# Patient Record
Sex: FEMALE | Race: OTHER | NOT HISPANIC OR LATINO | Employment: OTHER | URBAN - METROPOLITAN AREA
[De-identification: names, ages, dates, MRNs, and addresses within clinical notes are randomized per-mention and may not be internally consistent; named-entity substitution may affect disease eponyms.]

---

## 2020-09-29 ENCOUNTER — OFFICE VISIT (OUTPATIENT)
Dept: FAMILY MEDICINE CLINIC | Facility: CLINIC | Age: 60
End: 2020-09-29
Payer: COMMERCIAL

## 2020-09-29 VITALS
DIASTOLIC BLOOD PRESSURE: 98 MMHG | WEIGHT: 129.2 LBS | SYSTOLIC BLOOD PRESSURE: 160 MMHG | HEIGHT: 62 IN | TEMPERATURE: 97.6 F | BODY MASS INDEX: 23.77 KG/M2

## 2020-09-29 DIAGNOSIS — Z96.641 CHRONIC HIP PAIN AFTER TOTAL REPLACEMENT OF RIGHT HIP JOINT: ICD-10-CM

## 2020-09-29 DIAGNOSIS — G89.29 CHRONIC HIP PAIN AFTER TOTAL REPLACEMENT OF RIGHT HIP JOINT: ICD-10-CM

## 2020-09-29 DIAGNOSIS — M25.551 CHRONIC HIP PAIN AFTER TOTAL REPLACEMENT OF RIGHT HIP JOINT: ICD-10-CM

## 2020-09-29 DIAGNOSIS — S71.001A OPEN WOUND OF RIGHT HIP, INITIAL ENCOUNTER: Primary | ICD-10-CM

## 2020-09-29 PROCEDURE — 3725F SCREEN DEPRESSION PERFORMED: CPT | Performed by: FAMILY MEDICINE

## 2020-09-29 PROCEDURE — 99203 OFFICE O/P NEW LOW 30 MIN: CPT | Performed by: FAMILY MEDICINE

## 2020-09-29 RX ORDER — CEPHALEXIN 500 MG/1
500 CAPSULE ORAL EVERY 12 HOURS SCHEDULED
Qty: 20 CAPSULE | Refills: 0 | Status: SHIPPED | OUTPATIENT
Start: 2020-09-29 | End: 2020-10-09

## 2020-09-29 RX ORDER — SULFAMETHOXAZOLE AND TRIMETHOPRIM 800; 160 MG/1; MG/1
1 TABLET ORAL EVERY 12 HOURS SCHEDULED
Qty: 20 TABLET | Refills: 0 | Status: SHIPPED | OUTPATIENT
Start: 2020-09-29 | End: 2020-10-09

## 2020-09-29 NOTE — PROGRESS NOTES
Assessment/Plan:    Open wound of right hip  Patient really needs acute evaluation of this Patient refuses to go to hospital Patient sister who is with her said it took 2 weeks to convince her to come here Patient wound was cultured antibiotics were started Patient to have CBC and CMP done Check xray Refer to orhto If fever or chills the patient to go to ER Recheck in 2 weeks     Chronic hip pain after total replacement of right hip joint  Check xray patient to see ortho       Diagnoses and all orders for this visit:    Open wound of right hip, initial encounter  -     Ambulatory referral to Orthopedic Surgery; Future  -     Cancel: Wound culture and Gram stain; Future  -     Comprehensive metabolic panel; Future  -     CBC and differential; Future  -     sulfamethoxazole-trimethoprim (BACTRIM DS) 800-160 mg per tablet; Take 1 tablet by mouth every 12 (twelve) hours for 10 days  -     cephalexin (KEFLEX) 500 mg capsule; Take 1 capsule (500 mg total) by mouth every 12 (twelve) hours for 10 days  -     XR hip/pelv 2-3 vws right if performed; Future  -     Comprehensive metabolic panel  -     CBC and differential  -     Culture, Aerobic and Anaerobic w/Gram Stain    Chronic hip pain after total replacement of right hip joint  -     Ambulatory referral to Orthopedic Surgery; Future  -     XR hip/pelv 2-3 vws right if performed; Future          Subjective:   Chief Complaint   Patient presents with    Hip Pain     right           Patient ID: Billie Villanueva is a 61 y o  female      Patient is here as a new patient today She has not seen any family doctors in almost 5 yrs She thinks she had labs done about 2 yrs ago "somewhere out on [employer]" Patient had been scheduled ot have surgery for her right hip Dr Leroy Osullivan about 2 yrs ago She has had trauma and had surgery in her early 19's Patient has metal pins plates and wires Patient hs had issue with painful hardware and was to have it removed patient surgery was cancelled for some reason so she "just never went back" Patient ahs since then had an area on her right thigh that keeps   " breaking open and draining" then will eventually heal Patient has for last 2 months had area open and draining Patient does not want to see Dr Jerson Mcmahon and also does not want to see [facility] Patient is thinking of seeing Dr Bassam Cristobal Patient has no fever or chills Patient has constant hip joint pain on the right in addition to the pain and drainage on the right thigh wound Patient refuses ER or hospitalization for this Patient also has history of abnorma cervical cancer screening She has not had colonoscopy She has significant fear or doctors Patient is extremely nervous today     Hip Pain    Incident onset: pain for 2 yrs Wound for 2 months  Injury mechanism: no actual injury Her "surgical hardware is shifting" The pain is present in the right hip  The pain is at a severity of 5/10  The pain is moderate  The pain has been worsening since onset  Pertinent negatives include no inability to bear weight, loss of motion, loss of sensation, muscle weakness, numbness or tingling  Foreign body present: metal hardware  The symptoms are aggravated by movement  She has tried acetaminophen for the symptoms  The treatment provided no relief  The following portions of the patient's history were reviewed and updated as appropriate: allergies, current medications, past family history, past medical history, past social history, past surgical history and problem list     Review of Systems   Constitutional: Negative for fatigue, fever and unexpected weight change  HENT: Negative for congestion, sinus pain and trouble swallowing  Eyes: Negative for discharge and visual disturbance  Respiratory: Negative for cough, chest tightness, shortness of breath and wheezing  Cardiovascular: Negative for chest pain, palpitations and leg swelling     Gastrointestinal: Negative for abdominal pain, blood in stool, constipation, diarrhea, nausea and vomiting  Genitourinary: Negative for difficulty urinating, dysuria, frequency and hematuria  Musculoskeletal: Positive for gait problem  Negative for arthralgias and joint swelling  Right hip pain   Skin: Positive for wound  Negative for rash  Allergic/Immunologic: Negative for environmental allergies and food allergies  Neurological: Negative for dizziness, tingling, syncope, weakness, numbness and headaches  Hematological: Negative for adenopathy  Does not bruise/bleed easily  Psychiatric/Behavioral: Negative for confusion, decreased concentration and sleep disturbance  The patient is not nervous/anxious  Objective:      /98   Temp 97 6 °F (36 4 °C)   Ht 5' 2" (1 575 m)   Wt 58 6 kg (129 lb 3 2 oz)   BMI 23 63 kg/m²          Physical Exam  Vitals signs and nursing note reviewed  Constitutional:       Appearance: Normal appearance  HENT:      Head: Normocephalic and atraumatic  Right Ear: Tympanic membrane normal       Left Ear: Tympanic membrane normal    Eyes:      Extraocular Movements: Extraocular movements intact  Conjunctiva/sclera: Conjunctivae normal       Pupils: Pupils are equal, round, and reactive to light  Cardiovascular:      Rate and Rhythm: Normal rate and regular rhythm  Pulses: Normal pulses  Heart sounds: Normal heart sounds  Pulmonary:      Effort: Pulmonary effort is normal       Breath sounds: Normal breath sounds  Abdominal:      General: Abdomen is flat  Bowel sounds are normal       Palpations: Abdomen is soft  Musculoskeletal:      Comments: Right lateral thigh with deficit noted Patient has decrease ROM of the right hip Patient has open wound draining thick yellow materail Wound is about 5 mm in size   Neurological:      Mental Status: She is alert  Cranial Nerves: No cranial nerve deficit        Gait: Gait abnormal    Psychiatric:         Mood and Affect: Mood normal  Behavior: Behavior normal          Thought Content:  Thought content normal          Judgment: Judgment normal

## 2020-09-29 NOTE — ASSESSMENT & PLAN NOTE
Patient really needs acute evaluation of this Patient refuses to go to hospital Patient sister who is with her said it took 2 weeks to convince her to come here Patient wound was cultured antibiotics were started Patient to have CBC and CMP done Check xray Refer to orhto If fever or chills the patient to go to ER Recheck in 2 weeks

## 2020-09-30 ENCOUNTER — APPOINTMENT (OUTPATIENT)
Dept: RADIOLOGY | Facility: MEDICAL CENTER | Age: 60
End: 2020-09-30
Payer: COMMERCIAL

## 2020-09-30 DIAGNOSIS — M25.551 CHRONIC HIP PAIN AFTER TOTAL REPLACEMENT OF RIGHT HIP JOINT: ICD-10-CM

## 2020-09-30 DIAGNOSIS — G89.29 CHRONIC HIP PAIN AFTER TOTAL REPLACEMENT OF RIGHT HIP JOINT: ICD-10-CM

## 2020-09-30 DIAGNOSIS — S71.001A OPEN WOUND OF RIGHT HIP, INITIAL ENCOUNTER: ICD-10-CM

## 2020-09-30 DIAGNOSIS — Z96.641 CHRONIC HIP PAIN AFTER TOTAL REPLACEMENT OF RIGHT HIP JOINT: ICD-10-CM

## 2020-09-30 PROCEDURE — 73502 X-RAY EXAM HIP UNI 2-3 VIEWS: CPT

## 2020-10-13 ENCOUNTER — OFFICE VISIT (OUTPATIENT)
Dept: OBGYN CLINIC | Facility: MEDICAL CENTER | Age: 60
End: 2020-10-13
Payer: COMMERCIAL

## 2020-10-13 VITALS
DIASTOLIC BLOOD PRESSURE: 68 MMHG | WEIGHT: 129 LBS | HEIGHT: 62 IN | BODY MASS INDEX: 23.74 KG/M2 | TEMPERATURE: 98.3 F | SYSTOLIC BLOOD PRESSURE: 125 MMHG

## 2020-10-13 DIAGNOSIS — G89.29 CHRONIC HIP PAIN AFTER TOTAL REPLACEMENT OF RIGHT HIP JOINT: Primary | ICD-10-CM

## 2020-10-13 DIAGNOSIS — Z96.641 CHRONIC HIP PAIN AFTER TOTAL REPLACEMENT OF RIGHT HIP JOINT: Primary | ICD-10-CM

## 2020-10-13 DIAGNOSIS — M25.551 CHRONIC HIP PAIN AFTER TOTAL REPLACEMENT OF RIGHT HIP JOINT: Primary | ICD-10-CM

## 2020-10-13 DIAGNOSIS — S71.001A OPEN WOUND OF RIGHT HIP, INITIAL ENCOUNTER: ICD-10-CM

## 2020-10-13 PROCEDURE — 99243 OFF/OP CNSLTJ NEW/EST LOW 30: CPT | Performed by: ORTHOPAEDIC SURGERY

## 2020-10-20 ENCOUNTER — TELEPHONE (OUTPATIENT)
Dept: FAMILY MEDICINE CLINIC | Facility: CLINIC | Age: 60
End: 2020-10-20

## 2020-10-20 ENCOUNTER — TELEPHONE (OUTPATIENT)
Dept: OBGYN CLINIC | Facility: HOSPITAL | Age: 60
End: 2020-10-20

## 2020-10-20 DIAGNOSIS — F06.4 ANXIETY DISORDER DUE TO MEDICAL CONDITION: Primary | ICD-10-CM

## 2020-10-20 RX ORDER — DIAZEPAM 5 MG/1
TABLET ORAL
Qty: 1 TABLET | Refills: 0 | Status: SHIPPED | OUTPATIENT
Start: 2020-10-20 | End: 2022-06-07

## 2020-10-21 ENCOUNTER — HOSPITAL ENCOUNTER (OUTPATIENT)
Dept: RADIOLOGY | Facility: HOSPITAL | Age: 60
Discharge: HOME | End: 2020-10-21
Attending: ORTHOPAEDIC SURGERY | Admitting: RADIOLOGY
Payer: COMMERCIAL

## 2020-10-21 DIAGNOSIS — S71.001A OPEN WOUND OF RIGHT HIP, INITIAL ENCOUNTER: ICD-10-CM

## 2020-10-21 DIAGNOSIS — M25.551 CHRONIC HIP PAIN AFTER TOTAL REPLACEMENT OF RIGHT HIP JOINT: ICD-10-CM

## 2020-10-21 DIAGNOSIS — G89.29 CHRONIC HIP PAIN AFTER TOTAL REPLACEMENT OF RIGHT HIP JOINT: ICD-10-CM

## 2020-10-21 DIAGNOSIS — Z96.641 CHRONIC HIP PAIN AFTER TOTAL REPLACEMENT OF RIGHT HIP JOINT: ICD-10-CM

## 2020-10-21 LAB
APPEARANCE FLD: ABNORMAL
COLOR FLD: ABNORMAL
CRYSTALS SNV QL MICRO: NORMAL
EOSINOPHIL NFR SNV MANUAL: 1 %
LYMPHOCYTES # SNV MANUAL: 5 %
MONOCYTES NFR SNV MANUAL: 2 %
NEUTROPHILS NFR SNV MANUAL: 92 %
SITE: ABNORMAL
TOTAL CELLS COUNTED SPEC: 100
WBC # FLD MANUAL: 1191 /UL (ref 0–200)

## 2020-10-21 PROCEDURE — 77002 NEEDLE LOCALIZATION BY XRAY: CPT

## 2020-10-21 PROCEDURE — 87205 SMEAR GRAM STAIN: CPT | Performed by: PHYSICIAN ASSISTANT

## 2020-10-21 PROCEDURE — 20610 DRAIN/INJ JOINT/BURSA W/O US: CPT

## 2020-10-21 PROCEDURE — 89060 EXAM SYNOVIAL FLUID CRYSTALS: CPT | Performed by: PHYSICIAN ASSISTANT

## 2020-10-21 PROCEDURE — 87075 CULTR BACTERIA EXCEPT BLOOD: CPT | Performed by: PHYSICIAN ASSISTANT

## 2020-10-21 PROCEDURE — 87070 CULTURE OTHR SPECIMN AEROBIC: CPT | Performed by: PHYSICIAN ASSISTANT

## 2020-10-21 PROCEDURE — 89051 BODY FLUID CELL COUNT: CPT | Performed by: PHYSICIAN ASSISTANT

## 2020-10-21 RX ORDER — SODIUM CHLORIDE 9 MG/ML
10 INJECTION INTRAVENOUS
Status: DISCONTINUED | OUTPATIENT
Start: 2020-10-21 | End: 2020-10-22 | Stop reason: HOSPADM

## 2020-10-21 RX ORDER — LIDOCAINE HYDROCHLORIDE 10 MG/ML
10 INJECTION, SOLUTION EPIDURAL; INFILTRATION; INTRACAUDAL; PERINEURAL
Status: DISCONTINUED | OUTPATIENT
Start: 2020-10-21 | End: 2020-10-22 | Stop reason: HOSPADM

## 2020-10-21 RX ADMIN — IOHEXOL 3 ML: 300 INJECTION, SOLUTION INTRAVENOUS at 16:10

## 2020-10-24 LAB
BACTERIA SPEC ANAEROBE CULT: NO GROWTH
BACTERIA SPEC BFLD CULT: NO GROWTH
GRAM STN SPEC: NORMAL

## 2020-11-03 ENCOUNTER — LAB (OUTPATIENT)
Dept: LAB | Facility: HOSPITAL | Age: 60
End: 2020-11-03
Attending: ORTHOPAEDIC SURGERY
Payer: COMMERCIAL

## 2020-11-03 ENCOUNTER — TELEPHONE (OUTPATIENT)
Dept: OBGYN CLINIC | Facility: MEDICAL CENTER | Age: 60
End: 2020-11-03

## 2020-11-03 DIAGNOSIS — M25.551 CHRONIC HIP PAIN AFTER TOTAL REPLACEMENT OF RIGHT HIP JOINT: ICD-10-CM

## 2020-11-03 DIAGNOSIS — G89.29 CHRONIC HIP PAIN AFTER TOTAL REPLACEMENT OF RIGHT HIP JOINT: ICD-10-CM

## 2020-11-03 DIAGNOSIS — Z96.641 CHRONIC HIP PAIN AFTER TOTAL REPLACEMENT OF RIGHT HIP JOINT: ICD-10-CM

## 2020-11-03 DIAGNOSIS — S71.001A OPEN WOUND OF RIGHT HIP, INITIAL ENCOUNTER: ICD-10-CM

## 2020-11-03 LAB
CRP SERPL QL: 5.2 MG/L
ERYTHROCYTE [SEDIMENTATION RATE] IN BLOOD: 15 MM/HOUR (ref 0–29)

## 2020-11-03 PROCEDURE — 85652 RBC SED RATE AUTOMATED: CPT

## 2020-11-03 PROCEDURE — 82495 ASSAY OF CHROMIUM: CPT

## 2020-11-03 PROCEDURE — 83018 HEAVY METAL QUAN EACH NES: CPT

## 2020-11-03 PROCEDURE — 36415 COLL VENOUS BLD VENIPUNCTURE: CPT

## 2020-11-03 PROCEDURE — 86140 C-REACTIVE PROTEIN: CPT

## 2020-11-05 LAB — COBALT SERPL-MCNC: 1 UG/L (ref 0–0.9)

## 2020-11-06 LAB — CR SERPL-MCNC: 2 UG/L (ref 0.1–2.1)

## 2020-11-10 ENCOUNTER — OFFICE VISIT (OUTPATIENT)
Dept: OBGYN CLINIC | Facility: MEDICAL CENTER | Age: 60
End: 2020-11-10
Payer: COMMERCIAL

## 2020-11-10 VITALS
WEIGHT: 130 LBS | HEART RATE: 108 BPM | HEIGHT: 62 IN | TEMPERATURE: 98.5 F | BODY MASS INDEX: 23.92 KG/M2 | DIASTOLIC BLOOD PRESSURE: 107 MMHG | SYSTOLIC BLOOD PRESSURE: 162 MMHG

## 2020-11-10 DIAGNOSIS — M25.551 CHRONIC HIP PAIN AFTER TOTAL REPLACEMENT OF RIGHT HIP JOINT: Primary | ICD-10-CM

## 2020-11-10 DIAGNOSIS — G89.29 CHRONIC HIP PAIN AFTER TOTAL REPLACEMENT OF RIGHT HIP JOINT: Primary | ICD-10-CM

## 2020-11-10 DIAGNOSIS — Z96.641 CHRONIC HIP PAIN AFTER TOTAL REPLACEMENT OF RIGHT HIP JOINT: Primary | ICD-10-CM

## 2020-11-10 PROCEDURE — 99214 OFFICE O/P EST MOD 30 MIN: CPT | Performed by: ORTHOPAEDIC SURGERY

## 2020-11-17 ENCOUNTER — OFFICE VISIT (OUTPATIENT)
Dept: OBGYN CLINIC | Facility: MEDICAL CENTER | Age: 60
End: 2020-11-17
Payer: COMMERCIAL

## 2020-11-17 VITALS
WEIGHT: 130 LBS | TEMPERATURE: 97.8 F | SYSTOLIC BLOOD PRESSURE: 139 MMHG | HEIGHT: 62 IN | DIASTOLIC BLOOD PRESSURE: 83 MMHG | HEART RATE: 106 BPM | BODY MASS INDEX: 23.92 KG/M2

## 2020-11-17 DIAGNOSIS — Z96.641 CHRONIC HIP PAIN AFTER TOTAL REPLACEMENT OF RIGHT HIP JOINT: Primary | ICD-10-CM

## 2020-11-17 DIAGNOSIS — M25.551 CHRONIC HIP PAIN AFTER TOTAL REPLACEMENT OF RIGHT HIP JOINT: Primary | ICD-10-CM

## 2020-11-17 DIAGNOSIS — G89.29 CHRONIC HIP PAIN AFTER TOTAL REPLACEMENT OF RIGHT HIP JOINT: Primary | ICD-10-CM

## 2020-11-17 PROCEDURE — 99214 OFFICE O/P EST MOD 30 MIN: CPT | Performed by: ORTHOPAEDIC SURGERY

## 2020-11-17 PROCEDURE — 3008F BODY MASS INDEX DOCD: CPT | Performed by: ORTHOPAEDIC SURGERY

## 2022-06-02 ENCOUNTER — HOSPITAL ENCOUNTER (INPATIENT)
Facility: HOSPITAL | Age: 62
LOS: 4 days | Discharge: HOME | DRG: 192 | End: 2022-06-07
Attending: EMERGENCY MEDICINE | Admitting: INTERNAL MEDICINE
Payer: COMMERCIAL

## 2022-06-02 ENCOUNTER — APPOINTMENT (EMERGENCY)
Dept: RADIOLOGY | Facility: HOSPITAL | Age: 62
DRG: 192 | End: 2022-06-02
Payer: COMMERCIAL

## 2022-06-02 DIAGNOSIS — R60.0 LOWER EXTREMITY EDEMA: ICD-10-CM

## 2022-06-02 DIAGNOSIS — I10 HYPERTENSION: ICD-10-CM

## 2022-06-02 DIAGNOSIS — K04.7 TOOTH INFECTION: ICD-10-CM

## 2022-06-02 DIAGNOSIS — R79.89 ELEVATED D-DIMER: ICD-10-CM

## 2022-06-02 DIAGNOSIS — I50.9 CHF (CONGESTIVE HEART FAILURE) (HCC): Primary | ICD-10-CM

## 2022-06-02 DIAGNOSIS — R59.0 MEDIASTINAL LYMPHADENOPATHY: ICD-10-CM

## 2022-06-02 DIAGNOSIS — R65.10 SIRS (SYSTEMIC INFLAMMATORY RESPONSE SYNDROME) (HCC): ICD-10-CM

## 2022-06-02 LAB
ANION GAP SERPL CALCULATED.3IONS-SCNC: 10 MMOL/L (ref 4–13)
BASOPHILS # BLD AUTO: 0.14 THOUSANDS/ΜL (ref 0–0.1)
BASOPHILS NFR BLD AUTO: 1 % (ref 0–1)
BUN SERPL-MCNC: 17 MG/DL (ref 5–25)
CALCIUM SERPL-MCNC: 8.7 MG/DL (ref 8.3–10.1)
CARDIAC TROPONIN I PNL SERPL HS: 44 NG/L
CHLORIDE SERPL-SCNC: 101 MMOL/L (ref 100–108)
CO2 SERPL-SCNC: 26 MMOL/L (ref 21–32)
CREAT SERPL-MCNC: 0.87 MG/DL (ref 0.6–1.3)
D DIMER PPP FEU-MCNC: 1.01 UG/ML FEU
EOSINOPHIL # BLD AUTO: 0.29 THOUSAND/ΜL (ref 0–0.61)
EOSINOPHIL NFR BLD AUTO: 2 % (ref 0–6)
ERYTHROCYTE [DISTWIDTH] IN BLOOD BY AUTOMATED COUNT: 18.3 % (ref 11.6–15.1)
GFR SERPL CREATININE-BSD FRML MDRD: 72 ML/MIN/1.73SQ M
GLUCOSE SERPL-MCNC: 105 MG/DL (ref 65–140)
HCT VFR BLD AUTO: 38.1 % (ref 34.8–46.1)
HGB BLD-MCNC: 11.7 G/DL (ref 11.5–15.4)
IMM GRANULOCYTES # BLD AUTO: 0.07 THOUSAND/UL (ref 0–0.2)
IMM GRANULOCYTES NFR BLD AUTO: 1 % (ref 0–2)
LACTATE SERPL-SCNC: 1.2 MMOL/L (ref 0.5–2)
LYMPHOCYTES # BLD AUTO: 2.7 THOUSANDS/ΜL (ref 0.6–4.47)
LYMPHOCYTES NFR BLD AUTO: 21 % (ref 14–44)
MCH RBC QN AUTO: 25.3 PG (ref 26.8–34.3)
MCHC RBC AUTO-ENTMCNC: 30.7 G/DL (ref 31.4–37.4)
MCV RBC AUTO: 83 FL (ref 82–98)
MONOCYTES # BLD AUTO: 1.15 THOUSAND/ΜL (ref 0.17–1.22)
MONOCYTES NFR BLD AUTO: 9 % (ref 4–12)
NEUTROPHILS # BLD AUTO: 8.53 THOUSANDS/ΜL (ref 1.85–7.62)
NEUTS SEG NFR BLD AUTO: 66 % (ref 43–75)
NRBC BLD AUTO-RTO: 0 /100 WBCS
NT-PROBNP SERPL-MCNC: 9840 PG/ML
PLATELET # BLD AUTO: 486 THOUSANDS/UL (ref 149–390)
PMV BLD AUTO: 10.8 FL (ref 8.9–12.7)
POTASSIUM SERPL-SCNC: 4.8 MMOL/L (ref 3.5–5.3)
RBC # BLD AUTO: 4.62 MILLION/UL (ref 3.81–5.12)
SODIUM SERPL-SCNC: 137 MMOL/L (ref 136–145)
WBC # BLD AUTO: 12.88 THOUSAND/UL (ref 4.31–10.16)

## 2022-06-02 PROCEDURE — 83605 ASSAY OF LACTIC ACID: CPT | Performed by: EMERGENCY MEDICINE

## 2022-06-02 PROCEDURE — 80048 BASIC METABOLIC PNL TOTAL CA: CPT | Performed by: EMERGENCY MEDICINE

## 2022-06-02 PROCEDURE — 80061 LIPID PANEL: CPT | Performed by: PHYSICIAN ASSISTANT

## 2022-06-02 PROCEDURE — 83880 ASSAY OF NATRIURETIC PEPTIDE: CPT | Performed by: EMERGENCY MEDICINE

## 2022-06-02 PROCEDURE — 71046 X-RAY EXAM CHEST 2 VIEWS: CPT

## 2022-06-02 PROCEDURE — 36415 COLL VENOUS BLD VENIPUNCTURE: CPT | Performed by: EMERGENCY MEDICINE

## 2022-06-02 PROCEDURE — 99285 EMERGENCY DEPT VISIT HI MDM: CPT

## 2022-06-02 PROCEDURE — 93005 ELECTROCARDIOGRAM TRACING: CPT

## 2022-06-02 PROCEDURE — 84484 ASSAY OF TROPONIN QUANT: CPT | Performed by: EMERGENCY MEDICINE

## 2022-06-02 PROCEDURE — 85025 COMPLETE CBC W/AUTO DIFF WBC: CPT | Performed by: EMERGENCY MEDICINE

## 2022-06-02 PROCEDURE — 99291 CRITICAL CARE FIRST HOUR: CPT | Performed by: EMERGENCY MEDICINE

## 2022-06-02 PROCEDURE — 85379 FIBRIN DEGRADATION QUANT: CPT | Performed by: EMERGENCY MEDICINE

## 2022-06-02 PROCEDURE — 87040 BLOOD CULTURE FOR BACTERIA: CPT | Performed by: EMERGENCY MEDICINE

## 2022-06-02 RX ORDER — FUROSEMIDE 10 MG/ML
40 INJECTION INTRAMUSCULAR; INTRAVENOUS ONCE
Status: COMPLETED | OUTPATIENT
Start: 2022-06-02 | End: 2022-06-03

## 2022-06-03 ENCOUNTER — APPOINTMENT (EMERGENCY)
Dept: CT IMAGING | Facility: HOSPITAL | Age: 62
DRG: 192 | End: 2022-06-03
Payer: COMMERCIAL

## 2022-06-03 ENCOUNTER — APPOINTMENT (INPATIENT)
Dept: NON INVASIVE DIAGNOSTICS | Facility: HOSPITAL | Age: 62
DRG: 192 | End: 2022-06-03
Payer: COMMERCIAL

## 2022-06-03 PROBLEM — F17.210 SMOKES CIGARETTES: Chronic | Status: ACTIVE | Noted: 2022-06-03

## 2022-06-03 PROBLEM — R06.02 SHORTNESS OF BREATH: Status: ACTIVE | Noted: 2022-06-03

## 2022-06-03 PROBLEM — Z86.2 HISTORY OF LEUKOCYTOSIS: Chronic | Status: ACTIVE | Noted: 2022-06-03

## 2022-06-03 PROBLEM — R59.0 MEDIASTINAL LYMPHADENOPATHY: Status: ACTIVE | Noted: 2022-06-03

## 2022-06-03 PROBLEM — R65.10 SIRS (SYSTEMIC INFLAMMATORY RESPONSE SYNDROME) (HCC): Status: ACTIVE | Noted: 2022-06-03

## 2022-06-03 PROBLEM — Z78.9 ALCOHOL INGESTION, 1-4 DRINKS PER DAY: Chronic | Status: ACTIVE | Noted: 2022-06-03

## 2022-06-03 PROBLEM — J40 BRONCHITIS: Status: ACTIVE | Noted: 2022-06-03

## 2022-06-03 LAB
2HR DELTA HS TROPONIN: -1 NG/L
AORTIC ROOT: 2.9 CM
AORTIC VALVE MEAN VELOCITY: 11 M/S
APICAL FOUR CHAMBER EJECTION FRACTION: 15 %
ASCENDING AORTA: 3.2 CM
ATRIAL RATE: 109 BPM
ATRIAL RATE: 124 BPM
AV LVOT MEAN GRADIENT: 1 MMHG
AV LVOT PEAK GRADIENT: 3 MMHG
AV MEAN GRADIENT: 6 MMHG
AV PEAK GRADIENT: 10 MMHG
AV VELOCITY RATIO: 0.51
CARDIAC TROPONIN I PNL SERPL HS: 43 NG/L
CHOLEST SERPL-MCNC: 156 MG/DL
DOP CALC AO PEAK VEL: 1.61 M/S
DOP CALC AO VTI: 26.58 CM
DOP CALC LVOT PEAK VEL VTI: 13.9 CM
DOP CALC LVOT PEAK VEL: 0.82 M/S
E WAVE DECELERATION TIME: 96 MS
FRACTIONAL SHORTENING: 15 % (ref 28–44)
GLOBAL LONGITUIDAL STRAIN: -9 %
HDLC SERPL-MCNC: 55 MG/DL
INTERVENTRICULAR SEPTUM IN DIASTOLE (PARASTERNAL SHORT AXIS VIEW): 0.7 CM
INTERVENTRICULAR SEPTUM: 0.7 CM (ref 0.6–1.1)
LAAS-AP4: 32.8 CM2
LDLC SERPL CALC-MCNC: 85 MG/DL (ref 0–100)
LEFT ATRIUM SIZE: 5.4 CM
LEFT INTERNAL DIMENSION IN SYSTOLE: 5.1 CM (ref 2.1–4)
LEFT VENTRICULAR INTERNAL DIMENSION IN DIASTOLE: 6 CM (ref 3.5–6)
LEFT VENTRICULAR POSTERIOR WALL IN END DIASTOLE: 0.7 CM
LEFT VENTRICULAR STROKE VOLUME: 58 ML
LVSV (TEICH): 58 ML
MV E'TISSUE VEL-SEP: 5 CM/S
MV PEAK A VEL: 0.43 M/S
MV PEAK E VEL: 126 CM/S
MV STENOSIS PRESSURE HALF TIME: 28 MS
MV VALVE AREA P 1/2 METHOD: 7.86 CM2
P AXIS: 60 DEGREES
P AXIS: 68 DEGREES
PR INTERVAL: 114 MS
PR INTERVAL: 114 MS
QRS AXIS: 54 DEGREES
QRS AXIS: 55 DEGREES
QRSD INTERVAL: 78 MS
QRSD INTERVAL: 80 MS
QT INTERVAL: 306 MS
QT INTERVAL: 318 MS
QTC INTERVAL: 428 MS
QTC INTERVAL: 439 MS
RA PRESSURE ESTIMATED: 15 MMHG
RIGHT VENTRICLE ID DIMENSION: 4.2 CM
RV PSP: 56 MMHG
SL CV LV EF: 28
SL CV PED ECHO LEFT VENTRICLE DIASTOLIC VOLUME (MOD BIPLANE) 2D: 181 ML
SL CV PED ECHO LEFT VENTRICLE SYSTOLIC VOLUME (MOD BIPLANE) 2D: 122 ML
T WAVE AXIS: 135 DEGREES
T WAVE AXIS: 214 DEGREES
TR MAX PG: 41 MMHG
TR PEAK VELOCITY: 3.2 M/S
TRICUSPID ANNULAR PLANE SYSTOLIC EXCURSION: 1.1 CM
TRICUSPID VALVE PEAK REGURGITATION VELOCITY: 3.2 M/S
TRIGL SERPL-MCNC: 78 MG/DL
VENTRICULAR RATE: 109 BPM
VENTRICULAR RATE: 124 BPM

## 2022-06-03 PROCEDURE — 36415 COLL VENOUS BLD VENIPUNCTURE: CPT | Performed by: EMERGENCY MEDICINE

## 2022-06-03 PROCEDURE — 94640 AIRWAY INHALATION TREATMENT: CPT

## 2022-06-03 PROCEDURE — 94760 N-INVAS EAR/PLS OXIMETRY 1: CPT

## 2022-06-03 PROCEDURE — 93005 ELECTROCARDIOGRAM TRACING: CPT

## 2022-06-03 PROCEDURE — 84484 ASSAY OF TROPONIN QUANT: CPT | Performed by: EMERGENCY MEDICINE

## 2022-06-03 PROCEDURE — 93306 TTE W/DOPPLER COMPLETE: CPT

## 2022-06-03 PROCEDURE — 99223 1ST HOSP IP/OBS HIGH 75: CPT | Performed by: STUDENT IN AN ORGANIZED HEALTH CARE EDUCATION/TRAINING PROGRAM

## 2022-06-03 PROCEDURE — 96365 THER/PROPH/DIAG IV INF INIT: CPT

## 2022-06-03 PROCEDURE — 99255 IP/OBS CONSLTJ NEW/EST HI 80: CPT | Performed by: INTERNAL MEDICINE

## 2022-06-03 PROCEDURE — 71275 CT ANGIOGRAPHY CHEST: CPT

## 2022-06-03 PROCEDURE — 96375 TX/PRO/DX INJ NEW DRUG ADDON: CPT

## 2022-06-03 PROCEDURE — 93010 ELECTROCARDIOGRAM REPORT: CPT | Performed by: INTERNAL MEDICINE

## 2022-06-03 PROCEDURE — 99254 IP/OBS CNSLTJ NEW/EST MOD 60: CPT

## 2022-06-03 PROCEDURE — G1004 CDSM NDSC: HCPCS

## 2022-06-03 RX ORDER — FOLIC ACID 1 MG/1
1 TABLET ORAL DAILY
Status: DISCONTINUED | OUTPATIENT
Start: 2022-06-03 | End: 2022-06-07 | Stop reason: HOSPADM

## 2022-06-03 RX ORDER — LEVALBUTEROL 1.25 MG/.5ML
1.25 SOLUTION, CONCENTRATE RESPIRATORY (INHALATION)
Status: DISCONTINUED | OUTPATIENT
Start: 2022-06-03 | End: 2022-06-07 | Stop reason: HOSPADM

## 2022-06-03 RX ORDER — LANOLIN ALCOHOL/MO/W.PET/CERES
100 CREAM (GRAM) TOPICAL DAILY
Status: DISCONTINUED | OUTPATIENT
Start: 2022-06-03 | End: 2022-06-07 | Stop reason: HOSPADM

## 2022-06-03 RX ORDER — ASPIRIN 81 MG/1
325 TABLET ORAL DAILY
Status: DISCONTINUED | OUTPATIENT
Start: 2022-06-03 | End: 2022-06-04

## 2022-06-03 RX ORDER — ATORVASTATIN CALCIUM 40 MG/1
40 TABLET, FILM COATED ORAL
Status: DISCONTINUED | OUTPATIENT
Start: 2022-06-03 | End: 2022-06-04

## 2022-06-03 RX ORDER — SPIRONOLACTONE 25 MG/1
12.5 TABLET ORAL DAILY
Status: DISCONTINUED | OUTPATIENT
Start: 2022-06-03 | End: 2022-06-06

## 2022-06-03 RX ORDER — NICOTINE 21 MG/24HR
21 PATCH, TRANSDERMAL 24 HOURS TRANSDERMAL DAILY
Status: DISCONTINUED | OUTPATIENT
Start: 2022-06-03 | End: 2022-06-07 | Stop reason: HOSPADM

## 2022-06-03 RX ORDER — METHYLPREDNISOLONE SODIUM SUCCINATE 40 MG/ML
40 INJECTION, POWDER, LYOPHILIZED, FOR SOLUTION INTRAMUSCULAR; INTRAVENOUS EVERY 12 HOURS SCHEDULED
Status: DISCONTINUED | OUTPATIENT
Start: 2022-06-03 | End: 2022-06-04

## 2022-06-03 RX ORDER — ONDANSETRON 2 MG/ML
4 INJECTION INTRAMUSCULAR; INTRAVENOUS EVERY 6 HOURS PRN
Status: DISCONTINUED | OUTPATIENT
Start: 2022-06-03 | End: 2022-06-07 | Stop reason: HOSPADM

## 2022-06-03 RX ORDER — FUROSEMIDE 10 MG/ML
40 INJECTION INTRAMUSCULAR; INTRAVENOUS DAILY
Status: DISCONTINUED | OUTPATIENT
Start: 2022-06-03 | End: 2022-06-05

## 2022-06-03 RX ORDER — ENOXAPARIN SODIUM 100 MG/ML
40 INJECTION SUBCUTANEOUS DAILY
Status: DISCONTINUED | OUTPATIENT
Start: 2022-06-03 | End: 2022-06-07 | Stop reason: HOSPADM

## 2022-06-03 RX ORDER — SIMETHICONE 80 MG
80 TABLET,CHEWABLE ORAL 4 TIMES DAILY PRN
Status: DISCONTINUED | OUTPATIENT
Start: 2022-06-03 | End: 2022-06-07 | Stop reason: HOSPADM

## 2022-06-03 RX ORDER — MAGNESIUM HYDROXIDE/ALUMINUM HYDROXICE/SIMETHICONE 120; 1200; 1200 MG/30ML; MG/30ML; MG/30ML
30 SUSPENSION ORAL EVERY 6 HOURS PRN
Status: DISCONTINUED | OUTPATIENT
Start: 2022-06-03 | End: 2022-06-07 | Stop reason: HOSPADM

## 2022-06-03 RX ORDER — AZITHROMYCIN 250 MG/1
500 TABLET, FILM COATED ORAL EVERY 24 HOURS
Status: COMPLETED | OUTPATIENT
Start: 2022-06-03 | End: 2022-06-05

## 2022-06-03 RX ADMIN — CEFTRIAXONE SODIUM 1000 MG: 10 INJECTION, POWDER, FOR SOLUTION INTRAVENOUS at 00:05

## 2022-06-03 RX ADMIN — AZITHROMYCIN MONOHYDRATE 500 MG: 250 TABLET ORAL at 10:04

## 2022-06-03 RX ADMIN — FUROSEMIDE 40 MG: 10 INJECTION, SOLUTION INTRAVENOUS at 00:05

## 2022-06-03 RX ADMIN — FUROSEMIDE 40 MG: 10 INJECTION, SOLUTION INTRAMUSCULAR; INTRAVENOUS at 10:04

## 2022-06-03 RX ADMIN — METHYLPREDNISOLONE SODIUM SUCCINATE 40 MG: 40 INJECTION, POWDER, FOR SOLUTION INTRAMUSCULAR; INTRAVENOUS at 21:15

## 2022-06-03 RX ADMIN — METOPROLOL TARTRATE 25 MG: 25 TABLET, FILM COATED ORAL at 03:52

## 2022-06-03 RX ADMIN — IPRATROPIUM BROMIDE 0.5 MG: 0.5 SOLUTION RESPIRATORY (INHALATION) at 14:01

## 2022-06-03 RX ADMIN — LEVALBUTEROL 1.25 MG: 1.25 SOLUTION, CONCENTRATE RESPIRATORY (INHALATION) at 09:40

## 2022-06-03 RX ADMIN — FOLIC ACID 1 MG: 1 TABLET ORAL at 10:04

## 2022-06-03 RX ADMIN — IOHEXOL 74 ML: 350 INJECTION, SOLUTION INTRAVENOUS at 00:58

## 2022-06-03 RX ADMIN — IPRATROPIUM BROMIDE 0.5 MG: 0.5 SOLUTION RESPIRATORY (INHALATION) at 09:40

## 2022-06-03 RX ADMIN — THIAMINE HCL TAB 100 MG 100 MG: 100 TAB at 10:04

## 2022-06-03 RX ADMIN — IPRATROPIUM BROMIDE 0.5 MG: 0.5 SOLUTION RESPIRATORY (INHALATION) at 19:44

## 2022-06-03 RX ADMIN — LEVALBUTEROL 1.25 MG: 1.25 SOLUTION, CONCENTRATE RESPIRATORY (INHALATION) at 19:44

## 2022-06-03 RX ADMIN — Medication 21 MG: at 10:04

## 2022-06-03 RX ADMIN — METHYLPREDNISOLONE SODIUM SUCCINATE 40 MG: 40 INJECTION, POWDER, FOR SOLUTION INTRAMUSCULAR; INTRAVENOUS at 10:05

## 2022-06-03 RX ADMIN — Medication 1 TABLET: at 10:04

## 2022-06-03 RX ADMIN — SPIRONOLACTONE 12.5 MG: 25 TABLET, FILM COATED ORAL at 17:34

## 2022-06-03 RX ADMIN — LEVALBUTEROL 1.25 MG: 1.25 SOLUTION, CONCENTRATE RESPIRATORY (INHALATION) at 14:01

## 2022-06-03 RX ADMIN — METOPROLOL TARTRATE 25 MG: 25 TABLET, FILM COATED ORAL at 21:15

## 2022-06-03 NOTE — ASSESSMENT & PLAN NOTE
· Reports dyspnea, JACKSON, orthopnea and edema  · Prior admission Sep 2021 at [address] [facility] [address] [facility] for peripheral edema after Right Hip Explant with Spacer Placement in Aug    · Has been seen outpatient by cardiology at [facility], unable to access progress notes  As per daughter at bedside patient was placed on medications for 'hypertension after surgery'    A/P:  · BNP >9800  Suspect ischemic cardiomyopathy and/or CHF  · Denies history of cardiac disease  · CTA chest shows trace right-sided pleural effusion  Mild right lower lobe bronchitis      · Will order echo  · Add IV furosemide 40 mg daily  · Restart metoprolol 25mg BID  · Monitor daily weight, I/O  · Monitor on telemetry   · Nutrition consult  · Cardiology consult

## 2022-06-03 NOTE — UTILIZATION REVIEW
Initial Clinical Review    Admission: Date/Time/Statement:   Admission Orders (From admission, onward)     Ordered        06/03/22 0150  INPATIENT ADMISSION  Once                      Orders Placed This Encounter   Procedures    INPATIENT ADMISSION     Standing Status:   Standing     Number of Occurrences:   1     Order Specific Question:   Level of Care     Answer:   Med Surg [16]     Order Specific Question:   Estimated length of stay     Answer:   More than 2 Midnights     Order Specific Question:   Certification     Answer:   I certify that inpatient services are medically necessary for this patient for a duration of greater than two midnights  See H&P and MD Progress Notes for additional information about the patient's course of treatment  ED Arrival Information     Expected   -    Arrival   6/2/2022 21:39    Acuity   Urgent            Means of arrival   Wheelchair    Escorted by   Family Member    Service   Hospitalist    Admission type   Urgent            Arrival complaint   SOB, Chest pain, Leg swelling           Chief Complaint   Patient presents with    Shortness of Breath     Sob, b/l leg swelling x 3 weeks  Worsening since then  Initial Presentation: 64 y o  female  Presents to ED from home with dyspnea for past  Week, worse the evening of admission, states she was unable to catch her breath  Has exertional dyspnea, orthopnea and bilateral lower extremity edema  Poor historian, apathetic  Labs reveal  BNP  >  9800  Met SIRS  Criteria on admission with  Tachycardia, leukocytosis and tachypnea  CT scan shows RLL bronchitis  PMH  Is  Tobacco abuse, leukocytosis and alcohol ingestion, drinks 2 glasses of vodka daily  Admit  Ip with  Shortness of breath, SIRS and plan is  Cardiology consult, tele, monitor labs, blood cultures, CIWA scores, MVI,   2 DE,  IV  Lasix  Daily and pulmonary consult, possible bronchoscopy  Pulmonary consult  Sats  94  % RA, not on home O2    Need sats  >  88 %      Possible acute exacerbation  COPD and  Continue  IV  S/medrol  BID  Continue  Atrovent/xopenex  Wait  Sputum cultures  Diminished aeration all lung fields  No indication for CARLO  At present  ED Triage Vitals   Temperature Pulse Respirations Blood Pressure SpO2   06/02/22 2147 06/02/22 2147 06/02/22 2147 06/02/22 2147 06/02/22 2147   98 5 °F (36 9 °C) (!) 114 (!) 24 156/98 96 %      Temp Source Heart Rate Source Patient Position - Orthostatic VS BP Location FiO2 (%)   06/02/22 2147 06/02/22 2147 06/02/22 2147 06/02/22 2147 --   Oral Monitor Sitting Right arm       Pain Score       06/02/22 2354       No Pain          Wt Readings from Last 1 Encounters:   06/03/22 64 kg (141 lb)     Additional Vital Signs:   98 5 °F (36 9 °C) 102 18 160/92 -- 96 % None (Room air) --    06/03/22 0940 -- -- -- -- -- 100 % None (Room air) --   06/03/22 0930 -- 101 -- 141/96 -- -- -- --   06/03/22 0751 97 7 °F (36 5 °C) 101 22 141/96 -- 92 % None (Room air) --   06/03/22 0527 -- 99 20 151/117 Abnormal  126 -- -- Sitting   06/03/22 0255 97 8 °F (36 6 °C) 118 Abnormal  20 174/103 Abnormal  131 95 % None (Room air) Lying   06/02/22 2354 -- 115 Abnormal  -- 176/110 Abnormal  -- 93 % None (Room air) Lying   06/02/22 2147 98 5 °F (36 9 °C) 114 Abnormal  24 Abnormal  156/98 -- 96 % None (Room air) Sitting     CIWA-Ar Total -- -- -- 5  -SK --   Row Name 06/02/22 2354 06/02/22 2147          Pertinent Labs/Diagnostic Test Results:   EKG      ST       HR  109    Normal QRS        T waves inverted     Q waves:  I, aVL, V5 and V6  CTA ED chest PE study   Final Result by Jose D Barroso DO (06/03 0132)      No evidence of pulmonary artery embolus      Trace right-sided pleural effusion      Mild right lower lobe bronchitis      Mediastinal lymphadenopathy  Follow-up is recommended                          Workstation performed: [id number]         XR chest 2 views   ED Interpretation by Rohini Owen MD (06/02 5488)   Primary reviewed: RLL infiltrate      Final Result by Chris Leavitt MD (06/03 5667)      Right basal opacity suspicious for infiltrate/atelectasis and small effusion                  Workstation performed: [id number]               Results from last 7 days   Lab Units 06/02/22  2234   WBC Thousand/uL 12 88*   HEMOGLOBIN g/dL 11 7   HEMATOCRIT % 38 1   PLATELETS Thousands/uL 486*   NEUTROS ABS Thousands/µL 8 53*         Results from last 7 days   Lab Units 06/02/22  2234   SODIUM mmol/L 137   POTASSIUM mmol/L 4 8   CHLORIDE mmol/L 101   CO2 mmol/L 26   ANION GAP mmol/L 10   BUN mg/dL 17   CREATININE mg/dL 0 87   EGFR ml/min/1 73sq m 72   CALCIUM mg/dL 8 7             Results from last 7 days   Lab Units 06/02/22  2234   GLUCOSE RANDOM mg/dL 105               Results from last 7 days   Lab Units 06/03/22  0026 06/02/22  2234   HS TNI 0HR ng/L  --  44   HS TNI 2HR ng/L 43  --    HSTNI D2 ng/L -1  --      Results from last 7 days   Lab Units 06/02/22  2305   D-DIMER QUANTITATIVE ug/ml FEU 1 01*                 Results from last 7 days   Lab Units 06/02/22  2305   LACTIC ACID mmol/L 1 2             Results from last 7 days   Lab Units 06/02/22  2234   NT-PRO BNP pg/mL 9,840*               Results from last 7 days   Lab Units 06/02/22  2305   BLOOD CULTURE  Received in Microbiology Lab  Culture in Progress  Received in Microbiology Lab  Culture in Progress  ED Treatment:   Medication Administration from 06/02/2022 2138 to 06/03/2022 0239       Date/Time Order Dose Route Action Comments     06/03/2022 0135 ceftriaxone (ROCEPHIN) 1 g/50 mL in dextrose IVPB 0 mg Intravenous Stopped      06/03/2022 0005 ceftriaxone (ROCEPHIN) 1 g/50 mL in dextrose IVPB 1,000 mg Intravenous New Bag      06/03/2022 0005 furosemide (LASIX) injection 40 mg 40 mg Intravenous Given      06/03/2022 0058 iohexol (OMNIPAQUE) 350 MG/ML injection (SINGLE-DOSE) 74 mL 74 mL Intravenous Given         History reviewed   No pertinent past medical history  Present on Admission:   Shortness of breath   Mediastinal lymphadenopathy   Smokes cigarettes   SIRS (systemic inflammatory response syndrome) ([facility])      Admitting Diagnosis: CHF (congestive heart failure) ([facility]) [I50 9]  Lower extremity edema [R60 0]  Hypertension [I10]  SIRS (systemic inflammatory response syndrome) ([facility]) [R65 10]  Elevated d-dimer [R79 89]  Age/Sex: 64 y o  female  Admission Orders:  Scheduled Medications:  azithromycin, 500 mg, Oral, Q24H  enoxaparin, 40 mg, Subcutaneous, Daily  folic acid, 1 mg, Oral, Daily  furosemide, 40 mg, Intravenous, Daily  ipratropium, 0 5 mg, Nebulization, TID  levalbuterol, 1 25 mg, Nebulization, TID  methylPREDNISolone sodium succinate, 40 mg, Intravenous, Q12H NAIF  metoprolol tartrate, 25 mg, Oral, Q12H [address]  multivitamin-minerals, 1 tablet, Oral, Daily  nicotine, 21 mg, Transdermal, Daily  thiamine, 100 mg, Oral, Daily      Continuous IV Infusions:     PRN Meds:  aluminum-magnesium hydroxide-simethicone, 30 mL, Oral, Q6H PRN  ondansetron, 4 mg, Intravenous, Q6H PRN  simethicone, 80 mg, Oral, 4x Daily PRN        IP CONSULT TO NUTRITION SERVICES  IP CONSULT TO CARDIOLOGY  IP CONSULT TO PULMONOLOGY    Network Utilization Review Department  ATTENTION: Please call with any questions or concerns to [phone] and carefully listen to the prompts so that you are directed to the right person  All voicemails are confidential   [employer] all requests for admission clinical reviews, approved or denied determinations and any other requests to dedicated fax number below belonging to the campus where the patient is receiving treatment   List of dedicated fax numbers for the Facilities:  [address] DENIALS (Administrative/Medical Necessity) [phone]   [address] (Maternity/NICU/Pediatrics) [phone]   [address] [address] [address] [facility] [phone]   [address] [id number] [address] [place] [address] [facility] [address] [place] [address] [phone]

## 2022-06-03 NOTE — ED PROVIDER NOTES
History  Chief Complaint   Patient presents with    Shortness of Breath     Sob, b/l leg swelling x 3 weeks  Worsening since then  57-year-old female presents for evaluation of shortness of breath bilateral lower extremity swelling  Patient states that she has had 2-3 weeks of bilateral lower extremity swelling which is constant, unchanged without modifying factors  Over the past 2-3 days patient has been having dyspnea  Dyspnea is constant, moderate to severe severity, worse with exertion  Positive orthopnea  No chest pain  History provided by:  Patient  Shortness of Breath  Associated symptoms: cough    Associated symptoms: no abdominal pain, no chest pain, no ear pain, no fever, no rash, no sore throat, no vomiting and no wheezing        Prior to Admission Medications   Prescriptions Last Dose Informant Patient Reported? Taking?   diazepam (VALIUM) 5 mg tablet   No No   Si tablet 30 minutes prior to procedure   Patient not taking: Reported on 11/10/2020      Facility-Administered Medications: None       History reviewed  No pertinent past medical history  Past Surgical History:   Procedure Laterality Date    CERVICAL CONE BIOPSY      FL GUIDED NEEDLE PLAC BX/ASP/INJ  10/21/2020    HIP SURGERY      HIP SURGERY         Family History   Problem Relation Age of Onset    Diabetes Mother     COPD Mother     Hyperlipidemia Mother     Stroke Mother     Lung cancer Father      I have reviewed and agree with the history as documented  E-Cigarette/Vaping    E-Cigarette Use Never User      E-Cigarette/Vaping Substances     Social History     Tobacco Use    Smoking status: Former Smoker     Packs/day: 0 50    Smokeless tobacco: Never Used   Vaping Use    Vaping Use: Never used   Substance Use Topics    Alcohol use: Yes    Drug use: Yes     Types: Marijuana       Review of Systems   Constitutional: Negative for activity change, appetite change, fatigue and fever     HENT: Negative for congestion, dental problem, ear pain, rhinorrhea and sore throat  Eyes: Negative for pain and redness  Respiratory: Positive for cough and shortness of breath  Negative for chest tightness and wheezing  Cardiovascular: Positive for leg swelling  Negative for chest pain and palpitations  Gastrointestinal: Negative for abdominal pain, blood in stool, constipation, diarrhea, nausea and vomiting  Endocrine: Negative for cold intolerance and heat intolerance  Genitourinary: Negative for dysuria, frequency and hematuria  Musculoskeletal: Negative for arthralgias and myalgias  Skin: Negative for color change, pallor and rash  Neurological: Negative for weakness and numbness  Hematological: Does not bruise/bleed easily  Psychiatric/Behavioral: Negative for agitation, hallucinations and suicidal ideas  Physical Exam  Physical Exam  Vitals and nursing note reviewed  Constitutional:       Appearance: She is well-developed  HENT:      Head: Normocephalic  Neck:      Thyroid: No thyromegaly  Cardiovascular:      Rate and Rhythm: Normal rate and regular rhythm  Pulmonary:      Effort: Pulmonary effort is normal       Breath sounds: Normal breath sounds  No decreased breath sounds or wheezing  Chest:      Chest wall: No tenderness  Abdominal:      General: There is no distension  Tenderness: There is no abdominal tenderness  Musculoskeletal:      Cervical back: Normal range of motion  Right lower leg: No tenderness  Edema present  Left lower leg: No tenderness  Edema present  Skin:     Capillary Refill: Capillary refill takes less than 2 seconds  Coloration: Skin is not cyanotic or pale  Findings: No rash  Neurological:      General: No focal deficit present  Mental Status: She is alert  She is disoriented  Psychiatric:         Mood and Affect: Mood normal  Mood is not anxious           Behavior: Behavior normal          Vital Signs  ED Triage Vitals Temperature Pulse Respirations Blood Pressure SpO2   06/02/22 2147 06/02/22 2147 06/02/22 2147 06/02/22 2147 06/02/22 2147   98 5 °F (36 9 °C) (!) 114 (!) 24 156/98 96 %      Temp Source Heart Rate Source Patient Position - Orthostatic VS BP Location FiO2 (%)   06/02/22 2147 06/02/22 2147 06/02/22 2147 06/02/22 2147 --   Oral Monitor Sitting Right arm       Pain Score       06/02/22 2354       No Pain           Vitals:    06/02/22 2147 06/02/22 2354   BP: 156/98 (!) 176/110   Pulse: (!) 114 (!) 115   Patient Position - Orthostatic VS: Sitting Lying         Visual Acuity      ED Medications  Medications   ceftriaxone (ROCEPHIN) 1 g/50 mL in dextrose IVPB (0 mg Intravenous Stopped 6/3/22 0135)   furosemide (LASIX) injection 40 mg (40 mg Intravenous Given 6/3/22 0005)   iohexol (OMNIPAQUE) 350 MG/ML injection (SINGLE-DOSE) 74 mL (74 mL Intravenous Given 6/3/22 0058)       Diagnostic Studies  Results Reviewed     Procedure Component Value Units Date/Time    HS Troponin I 2hr [[id number]]  (Normal) Collected: 06/03/22 0026    Lab Status: Final result Specimen: Blood from Arm, Right Updated: 06/03/22 0107     hs TnI 2hr 43 ng/L      Delta 2hr hsTnI -1 ng/L     HS Troponin I 4hr [[id number]]     Lab Status: No result Specimen: Blood     Lactic acid [[id number]]  (Normal) Collected: 06/02/22 2305    Lab Status: Final result Specimen: Blood from Arm, Right Updated: 06/02/22 2341     LACTIC ACID 1 2 mmol/L     Narrative:      Result may be elevated if tourniquet was used during collection  D-Dimer [[id number]]  (Abnormal) Collected: 06/02/22 2305    Lab Status: Final result Specimen: Blood from Arm, Right Updated: 06/02/22 2338     D-Dimer, Quant 1 01 ug/ml FEU     Narrative:       In the evaluation for possible pulmonary embolism, in the appropriate (Well's Score of 4 or less) patient, the age adjusted d-dimer cutoff for this patient can be calculated as:    Age x 0 01 (in ug/mL) for Age-adjusted D-dimer exclusion threshold for a patient over 50 years  Blood culture #1 [[id number]] Collected: 06/02/22 2305    Lab Status: In process Specimen: Blood from Arm, Right Updated: 06/02/22 2317    Blood culture #2 [[id number]] Collected: 06/02/22 2305    Lab Status:  In process Specimen: Blood from Arm, Right Updated: 06/02/22 5961    Basic metabolic panel [[id number]] Collected: 06/02/22 2234    Lab Status: Final result Specimen: Blood from Arm, Left Updated: 06/02/22 2308     Sodium 137 mmol/L      Potassium 4 8 mmol/L      Chloride 101 mmol/L      CO2 26 mmol/L      ANION GAP 10 mmol/L      BUN 17 mg/dL      Creatinine 0 87 mg/dL      Glucose 105 mg/dL      Calcium 8 7 mg/dL      eGFR 72 ml/min/1 73sq m     Narrative:      [place] guidelines for Chronic Kidney Disease (CKD):     Stage 1 with normal or high GFR (GFR > 90 mL/min/1 73 square meters)    Stage 2 Mild CKD (GFR = 60-89 mL/min/1 73 square meters)    Stage 3A Moderate CKD (GFR = 45-59 mL/min/1 73 square meters)    Stage 3B Moderate CKD (GFR = 30-44 mL/min/1 73 square meters)    Stage 4 Severe CKD (GFR = 15-29 mL/min/1 73 square meters)    Stage 5 End Stage CKD (GFR <15 mL/min/1 73 square meters)  Note: GFR calculation is accurate only with a steady state creatinine    NT-BNP PRO [[id number]]  (Abnormal) Collected: 06/02/22 2234    Lab Status: Final result Specimen: Blood from Arm, Left Updated: 06/02/22 2308     NT-proBNP 9,840 pg/mL     HS Troponin 0hr (reflex protocol) [[id number]]  (Normal) Collected: 06/02/22 2234    Lab Status: Final result Specimen: Blood from Arm, Left Updated: 06/02/22 2305     hs TnI 0hr 44 ng/L     CBC and differential [[id number]]  (Abnormal) Collected: 06/02/22 2234    Lab Status: Final result Specimen: Blood from Arm, Left Updated: 06/02/22 2241     WBC 12 88 Thousand/uL      RBC 4 62 Million/uL      Hemoglobin 11 7 g/dL      Hematocrit 38 1 %      MCV 83 fL      MCH 25 3 pg      MCHC 30 7 g/dL      RDW 18 3 %      MPV 10 8 fL      Platelets 505 Thousands/uL      nRBC 0 /100 WBCs      Neutrophils Relative 66 %      Immat GRANS % 1 %      Lymphocytes Relative 21 %      Monocytes Relative 9 %      Eosinophils Relative 2 %      Basophils Relative 1 %      Neutrophils Absolute 8 53 Thousands/µL      Immature Grans Absolute 0 07 Thousand/uL      Lymphocytes Absolute 2 70 Thousands/µL      Monocytes Absolute 1 15 Thousand/µL      Eosinophils Absolute 0 29 Thousand/µL      Basophils Absolute 0 14 Thousands/µL                  CTA ED chest PE study   Final Result by Diego Hannah DO (06/03 0132)      No evidence of pulmonary artery embolus      Trace right-sided pleural effusion      Mild right lower lobe bronchitis      Mediastinal lymphadenopathy  Follow-up is recommended                          Workstation performed: [id number]         XR chest 2 views   ED Interpretation by Lily Gregorio MD (06/02 2888)   Primary reviewed: RLL infiltrate                 Procedures  ECG 12 Lead Documentation Only    Date/Time: 6/2/2022 10:50 PM  Performed by: Lily Gregorio MD  Authorized by: Lily Gregorio MD     ECG reviewed by me, the ED Provider: yes    Patient location:  ED  Rate:     ECG rate:  109    ECG rate assessment: tachycardic    Rhythm:     Rhythm: sinus tachycardia    Ectopy:     Ectopy: none    QRS:     QRS axis:  Normal    QRS intervals:  Normal  Conduction:     Conduction: normal    ST segments:     ST segments:  Normal  T waves:     T waves: inverted    Q waves:     Q waves:  I, aVL, V5 and V6    CriticalCare Time  Performed by: Lily Gregorio MD  Authorized by: Lily Gregorio MD     Critical care provider statement:     Critical care time (minutes):  35    Critical care time was exclusive of:  Separately billable procedures and treating other patients and teaching time    Critical care was necessary to treat or prevent imminent or life-threatening deterioration of the following conditions:  Sepsis    Critical care was time spent personally by me on the following activities:  Blood draw for specimens, obtaining history from patient or surrogate, development of treatment plan with patient or surrogate, discussions with consultants, evaluation of patient's response to treatment, examination of patient, interpretation of cardiac output measurements, ordering and performing treatments and interventions, ordering and review of laboratory studies, ordering and review of radiographic studies, re-evaluation of patient's condition and review of old charts             ED Course  ED Course as of 06/03/22 0150   u Jun 02, 2022   2341 D-Dimer, Quant(!): 1 01  Will scan for pe                              Initial Sepsis Screening     Row Name 06/02/22 9889                Is the patient's history suggestive of a new or worsening infection? Yes (Proceed)  -        Suspected source of infection pneumonia  -        Are two or more of the following signs & symptoms of infection both present and new to the patient? --        Indicate SIRS criteria Tachycardia > 90 bpm;Tachypnea > 20 resp per min;Leukocytosis (WBC > 92891 IJL)  -        If the answer is yes to both questions, suspicion of sepsis is present --        If severe sepsis is present AND tissue hypoperfusion perists in the hour after fluid resuscitation or lactate > 4, the patient meets criteria for SEPTIC SHOCK --        Are any of the following organ dysfunction criteria present within 6 hours of suspected infection and SIRS criteria that are NOT considered to be chronic conditions?  --        Organ dysfunction --        Date of presentation of severe sepsis --        Time of presentation of severe sepsis --        Tissue hypoperfusion persists in the hour after crystalloid fluid administration, evidenced, by either: --        Was hypotension present within one hour of the conclusion of crystalloid fluid administration? --        Date of presentation of septic shock --        Time of presentation of septic shock --              User Key  (r) = Recorded By, (t) = Taken By, (c) = Cosigned By    [address] Name Provider Type    Rudolph Ch MD Physician                              MDM    Disposition  Final diagnoses:   CHF (congestive heart failure) ([place] [address] )   Lower extremity edema   Elevated d-dimer   SIRS (systemic inflammatory response syndrome) ([place] [address] )   Hypertension     Time reflects when diagnosis was documented in both MDM as applicable and the Disposition within this note     Time User Action Codes Description Comment    6/3/2022  1:48 AM Yandy Tejeda Add [I50 9] CHF (congestive heart failure) ([place] [address] )     6/3/2022  1:48 AM Chris Tejeda Add [R60 0] Lower extremity edema     6/3/2022  1:48 AM Huang Dixon [R79 89] Elevated d-dimer     6/3/2022  1:49 AM Yandy Tejeda Add [R65 10] SIRS (systemic inflammatory response syndrome) ([place] [address] )     6/3/2022  1:49 AM Huang Salinas Add [I10] Hypertension       ED Disposition     ED Disposition   Admit    Condition   Stable    Date/Time   Fri Feliciano 3, 2022  1:48 AM    Comment   Case was discussed with Waqar Correa and the patient's admission status was agreed to be Admission Status: inpatient status to the service of Dr Bert Cherry   Follow-up Information    None         Patient's Medications   Discharge Prescriptions    No medications on file       No discharge procedures on file      PDMP Review       Value Time User    PDMP Reviewed  Yes 10/20/2020  2:45 PM Cali Oviedo DO          ED Provider  Electronically Signed by           Ros Haywood MD  06/03/22 5571

## 2022-06-03 NOTE — ASSESSMENT & PLAN NOTE
· SIRS POA with tachycardia, tachypnea leukocytosis  · Chronic leukocytosis  · Chest CT shows mild right lower lobe bronchitis  · Received 1 dose of IV ceftriaxone in ED   Monitor off antibiotics  · Will check PCT  · Smoker, recommend outpatient follow-up with pulmonology for PFTs  · Blood culture in process

## 2022-06-03 NOTE — ASSESSMENT & PLAN NOTE
· CTA chest shows Mediastinal lymphadenopathy  · Smoker  · Will consult pulmonology; may require bronchoscopy inpatient

## 2022-06-03 NOTE — CONSULTS
Consultation - Pulmonary Medicine   Erin Leal 64 y o  female MRN: [id number]  Unit/Bed#: E4 -01 Encounter: [id number]      Assessment/Plan:    1  Acute pulmonary insufficiency likely multifaceted as listed below       -  currently room air-94%, patient does not wear home O2       -  continue saturations greater than 88%       -  pulmonary toileting:  Deep breathing cough, OOB as tolerated, IIS q 1 hr           2  COPD of unknown severity with possible acute exacerbation       -  Inpatient:  IV Solu-Medrol 40 mg b i d , Xopenex/Atrovent t i d        -  will need close pulmonary follow-up with PFT testing       -  emphysematous changes noted upon imaging       -  mediastinal lymphadenopathy-will need repeat imaging 3 months       -  home regimen:  Recommend discharge on LABA/LAMA, albuterol nebulizer and MDI    3  Elevated proBNP      -  proBNP- 9840      -  S/p 1x IV Lasix 40 mg- with reported improved shortness of breath      -  echo pending      -  continue IV Lasix 40 mg daily      -  I&Os and daily weights    4  Possible tracheobronchitis       -  will order sputum       -  at 3 days azithromycin      5  Tobacco abuse        -  patient reports quit smoking 2 weeks        -  encourage in educated on continued tobacco cessation        -  NRT per primary team               History of Present Illness   Physician Requesting Consult: Navneet Lopez MD  Reason for Consult / Principal Problem:  Short of breath  Hx and PE limited by:  Nothing  Chief Complaint: "I am feeling a little bit better"  HPI: Erin Leal is a 64 y o   [place] [address]  with complaints of shortness of breath  Patient is somewhat of a minimal past medical history just for anxiety and tobacco abuse  Patient reports that approximately 2 days ago she began feeling short of breath and increasing dyspnea  Upon ED admission patient was administered Rocephin, and 1 dose IV Lasix       Pulmonary was consulted for mediastinal lymphadenopathy  Today upon examination patient reports that she feels significantly better than upon admission  Patient reports that she has now quit smoking for approximately 2 weeks  Patient currently denying any fevers, chills, hemoptysis, headaches, night sweats, pleuritic chest pain, or palpitations  From a pulmonary standpoint, patient has not follow-up formally with a pulmonologist   Patient does report a 1 pack per day 44 year smoking history  Patient reports she quit smoking approximately 2 weeks ago  Patient is currently not maintained on any inhaled or oxygen therapies  Patient does report that she has 1 dog  Patient reports she has had no occupational exposures  Patient denies any symptoms of GERD, ANGELA, seasonal allergies, or postnasal drip  Patient denies any recent acute exposures to asbestos, silica, dust, or mold  Consults    Review of Systems   Constitutional: Positive for activity change  Negative for chills and fever  HENT: Negative for ear pain and sore throat  Eyes: Negative for pain and visual disturbance  Respiratory: Positive for cough and shortness of breath  Negative for apnea, choking, chest tightness, wheezing and stridor  Cardiovascular: Negative for chest pain and palpitations  Gastrointestinal: Negative for abdominal pain and vomiting  Genitourinary: Negative for dysuria and hematuria  Musculoskeletal: Negative for arthralgias and back pain  Skin: Negative for color change and rash  Neurological: Negative for seizures and syncope  All other systems reviewed and are negative  Historical Information   History reviewed  No pertinent past medical history    Past Surgical History:   Procedure Laterality Date    CERVICAL CONE BIOPSY      [place] GUIDED NEEDLE PLAC BX/ASP/INJ  10/21/2020    HIP SURGERY      HIP SURGERY       Social History   Social History     Substance and Sexual Activity   Alcohol Use Yes     Social History Substance and Sexual Activity   Drug Use Yes    Types: Marijuana     Social History     Tobacco Use   Smoking Status Former Smoker    Packs/day: 0 50   Smokeless Tobacco Never Used     E-Cigarette/Vaping    E-Cigarette Use Never User      E-Cigarette/Vaping Substances     Occupational History:  Noncontributory    Family History:   Family History   Problem Relation Age of Onset    Diabetes Mother    Julieann Frankel COPD Mother     Hyperlipidemia Mother     Stroke Mother     Lung cancer Father        Meds/Allergies   pertinent pulmonary meds have been reviewed    Allergies   Allergen Reactions    Codeine GI Intolerance and Vomiting    Oxycodone-Acetaminophen GI Intolerance and Vomiting    Propoxyphene Vomiting       Objective   Vitals: Blood pressure (!) 151/117, pulse 99, temperature 97 7 °F (36 5 °C), temperature source Temporal, resp  rate 20, weight 64 kg (141 lb 2 9 oz), SpO2 95 %  RA,Body mass index is 25 82 kg/m²  No intake or output data in the 24 hours ending 06/03/22 0754  Invasive Devices  Report    Peripheral Intravenous Line  Duration           Peripheral IV 06/02/22 Right Antecubital <1 day                Physical Exam  Constitutional:       General: She is not in acute distress  Appearance: Normal appearance  She is normal weight  She is not ill-appearing  HENT:      Head: Normocephalic and atraumatic  Nose: Nose normal  No congestion or rhinorrhea  Mouth/Throat:      Mouth: Mucous membranes are dry  Pharynx: No oropharyngeal exudate or posterior oropharyngeal erythema  Cardiovascular:      Rate and Rhythm: Normal rate and regular rhythm  Pulses: Normal pulses  Heart sounds: Normal heart sounds  No murmur heard  No friction rub  No gallop  Pulmonary:      Effort: Pulmonary effort is normal  No tachypnea, bradypnea, accessory muscle usage or respiratory distress  Breath sounds: Decreased air movement present  No stridor  Decreased breath sounds present   No wheezing, rhonchi or rales  Comments: Diminished aeration throughout lung fields  Chest:      Chest wall: No tenderness  Abdominal:      General: Abdomen is flat  Bowel sounds are normal  There is no distension  Palpations: Abdomen is soft  There is no mass  Musculoskeletal:         General: No swelling or tenderness  Normal range of motion  Cervical back: Normal range of motion  No rigidity or tenderness  Skin:     General: Skin is warm and dry  Coloration: Skin is not jaundiced or pale  Neurological:      General: No focal deficit present  Mental Status: She is alert and oriented to person, place, and time  Mental status is at baseline     Psychiatric:         Mood and Affect: Mood normal          Behavior: Behavior normal          Lab Results:   I have personally reviewed pertinent lab results, CBC:   Lab Results   Component Value Date    WBC 12 88 (H) 06/02/2022    HGB 11 7 06/02/2022    HCT 38 1 06/02/2022    MCV 83 06/02/2022     (H) 06/02/2022    MCH 25 3 (L) 06/02/2022    MCHC 30 7 (L) 06/02/2022    RDW 18 3 (H) 06/02/2022    MPV 10 8 06/02/2022    NRBC 0 06/02/2022   , CMP:   Lab Results   Component Value Date    SODIUM 137 06/02/2022    K 4 8 06/02/2022     06/02/2022    CO2 26 06/02/2022    BUN 17 06/02/2022    CREATININE 0 87 06/02/2022    CALCIUM 8 7 06/02/2022    EGFR 72 06/02/2022     Imaging Studies: I have personally reviewed pertinent films in PACS     CTA chest 06/03/2022-trace right-sided pleural effusion mid right lower lobe bronchitis mediastinal lymphadenopathy      EKG, Pathology, and Other Studies: I have personally reviewed pertinent films in PACS     EKG 6/2/2022- sinus tachycardia    Pulmonary Results (PFTs, PSG): I have personally reviewed pertinent films in PACS     No PFTs recorded    VTE Prophylaxis: Sequential compression device (Venodyne)     Code Status: Level 1 - Full Code    None    Portions of the record may have been created with voice recognition software  Occasional wrong word or "sound a like" substitutions may have occurred due to the inherent limitations of voice recognition software  Read the chart carefully and recognize, using context, where substitutions have occurred

## 2022-06-03 NOTE — ASSESSMENT & PLAN NOTE
· CTA shows mild right lower lobe bronchitis  · Check PCT  · Smoker, recommend outpatient follow-up with pulmonology for PFT

## 2022-06-03 NOTE — H&P
[facility]  H&P- Nayeli Jean-Baptiste 1960, 64 y o  female MRN: [id number]  Unit/Bed#: E4 -01 Encounter: [id number]  Primary Care Provider: Calvin Sharma DO   Date and time admitted to hospital: 6/2/2022  9:49 PM    * Shortness of breath  Assessment & Plan  · Reports dyspnea, JACKSON, orthopnea and edema  · Prior admission Sep 2021 at [address] [facility] for peripheral edema after Right Hip Explant with Spacer Placement in Aug    · Has been seen outpatient by cardiology at [facility], unable to access progress notes  As per daughter at bedside patient was placed on medications for 'hypertension after surgery'    A/P:  · BNP >9800  Suspect ischemic cardiomyopathy and/or CHF  · Denies history of cardiac disease  · CTA chest shows trace right-sided pleural effusion  Mild right lower lobe bronchitis  · Will order echo  · Add IV furosemide 40 mg daily  · Restart metoprolol 25mg BID  · Monitor daily weight, I/O  · Monitor on telemetry   · Nutrition consult  · Cardiology consult    SIRS (systemic inflammatory response syndrome) (HCC)  Assessment & Plan  · SIRS POA with tachycardia, tachypnea leukocytosis  · Chronic leukocytosis  · Chest CT shows mild right lower lobe bronchitis  · Received 1 dose of IV ceftriaxone in ED  Monitor off antibiotics  · Will check PCT  · Smoker, recommend outpatient follow-up with pulmonology for PFTs  · Blood culture in process    Mediastinal lymphadenopathy  Assessment & Plan  · CTA chest shows Mediastinal lymphadenopathy  · Smoker  · Will consult pulmonology; may require bronchoscopy inpatient    Alcohol ingestion, 1-4 drinks per day  Assessment & Plan  · Patient reports drinking 2 glasses of vodka and soda daily  · Add CIWA protocol, MVI, thiamine and folate  · Monitor lytes  · Host referral    Smokes cigarettes  Assessment & Plan  · Smokes >1/2 PPD; reports readiness to quit    Requesting NTD patch  · Tobacco cessation education    History of leukocytosis  Assessment & Plan  · Chronic leukocytosis, outpatient f/u with Hematology    VTE Prophylaxis: Enoxaparin (Lovenox)  / reason for no mechanical VTE prophylaxis ac   Code Status: FC  POLST: POLST is not applicable to this patient  Discussion with family:     Anticipated Length of Stay:  Patient will be admitted on an Inpatient basis with an anticipated length of stay of  > 2 midnights  Justification for Hospital Stay:  Elevated BNP with signs and symptoms of acute heart failure    Total Time for Visit, including Counseling / Coordination of Care: 60 minutes  Greater than 50% of this total time spent on direct patient counseling and coordination of care  Chief Complaint:   "Couldn't catch my breath"    History of Present Illness:    Priscilla Vang is a 64 y o  female who presents with c/o dyspnea  Reports shortness of breath for one week, symptoms worse tonight states she could not catch her breath  Reports associated exertional dyspnea, orthopnea and bilateral lower extremity edema  Denies history of cardiac disease  Patient apathetic, poor historian  Review of Systems:    Review of Systems   Constitutional: Negative  HENT: Negative  Respiratory: Positive for shortness of breath  Cardiovascular: Positive for palpitations and leg swelling  Negative for chest pain  JACKSON, orthopnea   Gastrointestinal: Negative  Genitourinary: Negative  Musculoskeletal: Negative  Skin: Negative  Neurological: Negative  Psychiatric/Behavioral: Negative  Past Medical and Surgical History:     History reviewed  No pertinent past medical history  Past Surgical History:   Procedure Laterality Date    CERVICAL CONE BIOPSY      FL GUIDED NEEDLE PLAC BX/ASP/INJ  10/21/2020    HIP SURGERY      HIP SURGERY         Meds/Allergies:    Prior to Admission medications    Medication Sig Start Date End Date Taking?  Authorizing Provider   diazepam (VALIUM) 5 mg tablet 1 tablet 30 minutes prior to procedure  Patient not taking: Reported on 11/10/2020 10/20/20   Perry Nassart, DO     I have reviewed home medications with patient personally  Allergies: Allergies   Allergen Reactions    Codeine GI Intolerance and Vomiting    Oxycodone-Acetaminophen GI Intolerance and Vomiting    Propoxyphene Vomiting       Social History:     Marital Status: /Civil Union   Occupation: unemployed  Patient Pre-hospital Living Situation:  Resides with spouse  Patient Pre-hospital Level of Mobility:  Ambulatory  Patient Pre-hospital Diet Restrictions:   Substance Use History:   Social History     Substance and Sexual Activity   Alcohol Use Yes     Social History     Tobacco Use   Smoking Status Former Smoker    Packs/day: 0 50   Smokeless Tobacco Never Used     Social History     Substance and Sexual Activity   Drug Use Yes    Types: Marijuana       Family History:    Family History   Problem Relation Age of Onset    Diabetes Mother     COPD Mother     Hyperlipidemia Mother     Stroke Mother     Lung cancer Father        Physical Exam:     Vitals:   Blood Pressure: (!) 174/103 (06/03/22 0255)  Pulse: (!) 118 (06/03/22 0255)  Temperature: 97 8 °F (36 6 °C) (06/03/22 0255)  Temp Source: Temporal (06/03/22 0255)  Respirations: 20 (06/03/22 0255)  SpO2: 95 % (06/03/22 0255)    Physical Exam  Constitutional:       General: She is not in acute distress  Appearance: Normal appearance  She is normal weight  She is not ill-appearing, toxic-appearing or diaphoretic  HENT:      Head: Normocephalic and atraumatic  Nose: No congestion or rhinorrhea  Mouth/Throat:      Mouth: Mucous membranes are moist    Eyes:      Conjunctiva/sclera: Conjunctivae normal    Neck:      Comments: No JVD  Cardiovascular:      Rate and Rhythm: Regular rhythm  Tachycardia present  Heart sounds: Normal heart sounds  Pulmonary:      Effort: Pulmonary effort is normal  No respiratory distress        Breath sounds: Rales present  Comments: Minimal rales  Abdominal:      General: Bowel sounds are normal       Palpations: Abdomen is soft  Musculoskeletal:      Right lower leg: Edema present  Left lower leg: Edema present  Comments: 3+ B/LLE edema   Skin:     General: Skin is warm and dry  Neurological:      Mental Status: She is alert  Psychiatric:      Comments: Apathetic       Additional Data:     Lab Results: I have personally reviewed pertinent reports  Results from last 7 days   Lab Units 06/02/22  2234   WBC Thousand/uL 12 88*   HEMOGLOBIN g/dL 11 7   HEMATOCRIT % 38 1   PLATELETS Thousands/uL 486*   NEUTROS PCT % 66   LYMPHS PCT % 21   MONOS PCT % 9   EOS PCT % 2     Results from last 7 days   Lab Units 06/02/22  2234   SODIUM mmol/L 137   POTASSIUM mmol/L 4 8   CHLORIDE mmol/L 101   CO2 mmol/L 26   BUN mg/dL 17   CREATININE mg/dL 0 87   ANION GAP mmol/L 10   CALCIUM mg/dL 8 7   GLUCOSE RANDOM mg/dL 105                 Results from last 7 days   Lab Units 06/02/22  2305   LACTIC ACID mmol/L 1 2       Imaging: I have personally reviewed pertinent reports  CTA ED chest PE study   Final Result by Tiffany Vela DO (06/03 0132)      No evidence of pulmonary artery embolus      Trace right-sided pleural effusion      Mild right lower lobe bronchitis      Mediastinal lymphadenopathy  Follow-up is recommended  Workstation performed: [id number]         XR chest 2 views   ED Interpretation by Rhona Oakley MD (06/02 2248)   Primary reviewed: RLL infiltrate          EKG, Pathology, and Other Studies Reviewed on Admission:   EKG: *stac pvc 109-124 cta cxr  Allscripts / Epic Records Reviewed: Yes     ** Please Note: This note has been constructed using a voice recognition system   **

## 2022-06-03 NOTE — ASSESSMENT & PLAN NOTE
· Patient reports drinking 2 glasses of vodka and soda daily  · Add CIWA protocol, MVI, thiamine and folate  · Monitor lytes  · Host referral

## 2022-06-04 PROBLEM — J44.9 COPD (CHRONIC OBSTRUCTIVE PULMONARY DISEASE) (HCC): Status: ACTIVE | Noted: 2022-06-04

## 2022-06-04 PROBLEM — K04.7 TOOTH INFECTION: Status: ACTIVE | Noted: 2022-06-04

## 2022-06-04 PROCEDURE — 94760 N-INVAS EAR/PLS OXIMETRY 1: CPT

## 2022-06-04 PROCEDURE — 94640 AIRWAY INHALATION TREATMENT: CPT

## 2022-06-04 PROCEDURE — 99232 SBSQ HOSP IP/OBS MODERATE 35: CPT | Performed by: STUDENT IN AN ORGANIZED HEALTH CARE EDUCATION/TRAINING PROGRAM

## 2022-06-04 PROCEDURE — 99232 SBSQ HOSP IP/OBS MODERATE 35: CPT | Performed by: INTERNAL MEDICINE

## 2022-06-04 RX ORDER — PREDNISONE 20 MG/1
40 TABLET ORAL DAILY
Status: COMPLETED | OUTPATIENT
Start: 2022-06-04 | End: 2022-06-06

## 2022-06-04 RX ORDER — AMOXICILLIN AND CLAVULANATE POTASSIUM 875; 125 MG/1; MG/1
1 TABLET, FILM COATED ORAL EVERY 12 HOURS SCHEDULED
Status: DISCONTINUED | OUTPATIENT
Start: 2022-06-04 | End: 2022-06-07 | Stop reason: HOSPADM

## 2022-06-04 RX ORDER — ASPIRIN 81 MG/1
81 TABLET ORAL DAILY
Status: DISCONTINUED | OUTPATIENT
Start: 2022-06-05 | End: 2022-06-06

## 2022-06-04 RX ORDER — LOSARTAN POTASSIUM 25 MG/1
25 TABLET ORAL DAILY
Status: DISCONTINUED | OUTPATIENT
Start: 2022-06-04 | End: 2022-06-06

## 2022-06-04 RX ADMIN — FUROSEMIDE 40 MG: 10 INJECTION, SOLUTION INTRAMUSCULAR; INTRAVENOUS at 09:15

## 2022-06-04 RX ADMIN — FOLIC ACID 1 MG: 1 TABLET ORAL at 09:15

## 2022-06-04 RX ADMIN — Medication 21 MG: at 09:16

## 2022-06-04 RX ADMIN — LEVALBUTEROL 1.25 MG: 1.25 SOLUTION, CONCENTRATE RESPIRATORY (INHALATION) at 07:52

## 2022-06-04 RX ADMIN — LOSARTAN POTASSIUM 25 MG: 25 TABLET, FILM COATED ORAL at 14:49

## 2022-06-04 RX ADMIN — THIAMINE HCL TAB 100 MG 100 MG: 100 TAB at 09:16

## 2022-06-04 RX ADMIN — SPIRONOLACTONE 12.5 MG: 25 TABLET, FILM COATED ORAL at 09:16

## 2022-06-04 RX ADMIN — Medication 1 TABLET: at 09:16

## 2022-06-04 RX ADMIN — IPRATROPIUM BROMIDE 0.5 MG: 0.5 SOLUTION RESPIRATORY (INHALATION) at 14:23

## 2022-06-04 RX ADMIN — LEVALBUTEROL 1.25 MG: 1.25 SOLUTION, CONCENTRATE RESPIRATORY (INHALATION) at 19:25

## 2022-06-04 RX ADMIN — METOPROLOL TARTRATE 25 MG: 25 TABLET, FILM COATED ORAL at 21:52

## 2022-06-04 RX ADMIN — AMOXICILLIN AND CLAVULANATE POTASSIUM 1 TABLET: 875; 125 TABLET, FILM COATED ORAL at 14:49

## 2022-06-04 RX ADMIN — METOPROLOL TARTRATE 25 MG: 25 TABLET, FILM COATED ORAL at 09:16

## 2022-06-04 RX ADMIN — AZITHROMYCIN MONOHYDRATE 500 MG: 250 TABLET ORAL at 09:15

## 2022-06-04 RX ADMIN — IPRATROPIUM BROMIDE 0.5 MG: 0.5 SOLUTION RESPIRATORY (INHALATION) at 07:52

## 2022-06-04 RX ADMIN — LEVALBUTEROL 1.25 MG: 1.25 SOLUTION, CONCENTRATE RESPIRATORY (INHALATION) at 14:22

## 2022-06-04 RX ADMIN — PREDNISONE 40 MG: 20 TABLET ORAL at 09:16

## 2022-06-04 RX ADMIN — AMOXICILLIN AND CLAVULANATE POTASSIUM 1 TABLET: 875; 125 TABLET, FILM COATED ORAL at 21:59

## 2022-06-04 RX ADMIN — IPRATROPIUM BROMIDE 0.5 MG: 0.5 SOLUTION RESPIRATORY (INHALATION) at 19:25

## 2022-06-04 NOTE — ASSESSMENT & PLAN NOTE
· Smokes >1/2 PPD; reports readiness to quit, last use 2 weeks prior   · Tobacco cessation education, nicotine patch

## 2022-06-04 NOTE — ASSESSMENT & PLAN NOTE
· Reports dyspnea, JACKSON, orthopnea and edema  · Elevated proBNP 9000 on admission  · Imaging consistent with heart failure, pleural effusions   · Diuresed with IV Lasix, cardiology following  · 2D echo showing  EF 63%, grade 3 diastolic dysfunction  · Systolic and diastolic heart failure  · Continue aspirin, beta-blockers, will need ACE/ARB  · Continue lasix  · Plan for further ischemic testing on Monday

## 2022-06-04 NOTE — ASSESSMENT & PLAN NOTE
Recently diagnosed outpatient with tooth infection  Continue augmentin for 7 days  Recommend outpatient follow up with dentist

## 2022-06-04 NOTE — PROGRESS NOTES
Progress Note - Marin Seen 64 y o  female MRN: [id number]    Unit/Bed#: E4 -01 Encounter: [id number]  Subjective:   No chest pain  Breathing easier  Less edema  [facility] no palpitations or dizziness  Objective:   Vitals: Blood pressure 135/83, pulse (!) 110, temperature 98 1 °F (36 7 °C), temperature source Temporal, resp  rate 18, height 5' 2" (1 575 m), weight 62 6 kg (137 lb 14 4 oz), SpO2 98 %  ,Body mass index is 25 22 kg/m²  Physical exam:  Lungs- few rales at bases  [facility] otherwise clear  Heart-Regular w/o murmur rub or gallop  Abd-NT without mass or OM  Ext-no edema  Assessment:  1  Dilated cardiomyopathy    new diagnosis  Likely related to alcohol excess  Patient on metoprolol 25 mg q 12 hours and spironolactone 12 5 mg daily  2  Acute systolic congestive heart failure  Gradually diuresing with furosemide 40 mg IV daily  -feeling better but does appear to be still fluid overloaded  3  Longstanding smoking history  Plan:  Continue IV diuresis  Repeat BMP in a m  Continue metoprolol at the current dosage  Continue spironolactone at the current dosage  Since LDL cholesterol 85 will stop atorvastatin  Will add afterload reduction in the form of losartan 25 mg daily  Tentative plan for cardiac catheterization on Monday to visualize coronary arteries    Will need to talk to daughter for patient to consent since daughter handles all her medical affairs      aPsha Spencer MD

## 2022-06-04 NOTE — ASSESSMENT & PLAN NOTE
· Patient reports drinking several drinks of alcohol daily  · CIWA protocol, patient denies hx of withdrawals

## 2022-06-04 NOTE — ASSESSMENT & PLAN NOTE
· CTA chest shows mediastinal lymphadenopathy  · Per pulmonology, recommend repeat CT imaging in 3 months  · Will need follow up outpatient with PCP or Pulmonology for lung screenings

## 2022-06-04 NOTE — ASSESSMENT & PLAN NOTE
Suspected likely COPD exacerbation  Wean IV steroids, transition to PO prednisone for 3 days  Continue bronchodilators  Will need follow up with pulmonology outpatient for PFTs

## 2022-06-04 NOTE — PROGRESS NOTES
[facility]  Progress Note - Marin Seen 1960, 64 y o  female MRN: [id number]  Unit/Bed#: E4 -01 Encounter: [id number]  Primary Care Provider: Manuel Soliman DO   Date and time admitted to hospital: 6/2/2022  9:49 PM    * Shortness of breath  Assessment & Plan  · Reports dyspnea, JACKSON, orthopnea and edema  · Elevated proBNP 9000 on admission  · Imaging consistent with heart failure, pleural effusions   · Diuresed with IV Lasix, cardiology following  · 2D echo showing  EF 79%, grade 3 diastolic dysfunction  · Systolic and diastolic heart failure  · Continue aspirin, beta-blockers, will need ACE/ARB  · Continue lasix  · Plan for further ischemic testing on Monday    COPD (chronic obstructive pulmonary disease) ([facility] [address] )  Assessment & Plan  Suspected likely COPD exacerbation  Wean IV steroids, transition to PO prednisone for 3 days  Continue bronchodilators  Will need follow up with pulmonology outpatient for PFTs    Tooth infection  Assessment & Plan  Recently diagnosed outpatient with tooth infection  Continue augmentin for 7 days  Recommend outpatient follow up with dentist    Alcohol ingestion, 1-4 drinks per day  Assessment & Plan  · Patient reports drinking several drinks of alcohol daily  · CIWA protocol, patient denies hx of withdrawals    Smokes cigarettes  Assessment & Plan  · Smokes >1/2 PPD; reports readiness to quit, last use 2 weeks prior   · Tobacco cessation education, nicotine patch    Mediastinal lymphadenopathy  Assessment & Plan  · CTA chest shows mediastinal lymphadenopathy  · Per pulmonology, recommend repeat CT imaging in 3 months  · Will need follow up outpatient with PCP or Pulmonology for lung screenings      VTE Pharmacologic Prophylaxis:   Pharmacologic: Enoxaparin (Lovenox)  Mechanical VTE Prophylaxis in Place: Yes    Patient Centered Rounds: I have performed bedside rounds with nursing staff today      Discussions with Specialists or Other Care Team Provider: Cardiology    Education and Discussions with Family / Patient: patient    Time Spent for Care: 30 minutes  More than 50% of total time spent on counseling and coordination of care as described above  Current Length of Stay: 1 day(s)    Current Patient Status: Inpatient   Certification Statement: The patient will continue to require additional inpatient hospital stay due to IV diuresis    Discharge Plan: pending    Code Status: Level 1 - Full Code      Subjective:   Reports breathing has improved, states near baseline  Denies any CP, nausea or vomiting  Call and updated daughter, discussed patient recent diagnosis of tooth infection  Objective:     Vitals:   Temp (24hrs), Av 4 °F (36 9 °C), Min:97 5 °F (36 4 °C), Max:99 4 °F (37 4 °C)    Temp:  [97 5 °F (36 4 °C)-99 4 °F (37 4 °C)] 98 1 °F (36 7 °C)  HR:  [] 110  Resp:  [18] 18  BP: (135-148)/(71-96) 135/83  SpO2:  [95 %-98 %] 98 %  Body mass index is 25 22 kg/m²  Input and Output Summary (last 24 hours): Intake/Output Summary (Last 24 hours) at 2022 1404  Last data filed at 6/3/2022 2118  Gross per 24 hour   Intake 360 ml   Output --   Net 360 ml       Physical Exam:     Physical Exam  Vitals and nursing note reviewed  Constitutional:       Appearance: Normal appearance  She is normal weight  HENT:      Head: Normocephalic and atraumatic  Eyes:      General: No scleral icterus  Conjunctiva/sclera: Conjunctivae normal    Cardiovascular:      Rate and Rhythm: Normal rate and regular rhythm  Heart sounds: Normal heart sounds  Pulmonary:      Effort: Pulmonary effort is normal       Breath sounds: No wheezing or rales  Abdominal:      General: Bowel sounds are normal  There is no distension  Palpations: Abdomen is soft  Musculoskeletal:         General: No swelling  Right lower leg: No edema  Left lower leg: No edema  Skin:     General: Skin is warm and dry     Neurological:      General: No focal deficit present  Mental Status: She is alert  Mental status is at baseline  Psychiatric:         Mood and Affect: Mood normal          Additional Data:     Labs:    Results from last 7 days   Lab Units 06/02/22  2234   WBC Thousand/uL 12 88*   HEMOGLOBIN g/dL 11 7   HEMATOCRIT % 38 1   PLATELETS Thousands/uL 486*   NEUTROS PCT % 66   LYMPHS PCT % 21   MONOS PCT % 9   EOS PCT % 2     Results from last 7 days   Lab Units 06/02/22  2234   SODIUM mmol/L 137   POTASSIUM mmol/L 4 8   CHLORIDE mmol/L 101   CO2 mmol/L 26   BUN mg/dL 17   CREATININE mg/dL 0 87   ANION GAP mmol/L 10   CALCIUM mg/dL 8 7   GLUCOSE RANDOM mg/dL 105                 Results from last 7 days   Lab Units 06/02/22  2305   LACTIC ACID mmol/L 1 2           * I Have Reviewed All Lab Data Listed Above  * Additional Pertinent Lab Tests Reviewed: [address] Admission Reviewed    Imaging:    XR chest 2 views    Result Date: 6/3/2022  Impression: Right basal opacity suspicious for infiltrate/atelectasis and small effusion Workstation performed: [id number]     CTA ED chest PE study    Result Date: 6/3/2022  Impression: No evidence of pulmonary artery embolus Trace right-sided pleural effusion Mild right lower lobe bronchitis Mediastinal lymphadenopathy  Follow-up is recommended  Workstation performed: [id number]       Recent Cultures (last 7 days):     Results from last 7 days   Lab Units 06/02/22  2305   BLOOD CULTURE  No Growth at 24 hrs  No Growth at 24 hrs         Last 24 Hours Medication List:   Current Facility-Administered Medications   Medication Dose Route Frequency Provider Last Rate    aluminum-magnesium hydroxide-simethicone  30 mL Oral Q6H PRN Elana Quiet, CRNP      amoxicillin-clavulanate  1 tablet Oral Q12H Clif Nicole MD      aspirin  325 mg Oral Daily Suly Gunn PA-C      atorvastatin  40 mg Oral Daily With Yanna Thomson PA-C      azithromycin  500 mg Oral Q24H Shirley Goldstein Penny, CRNP      enoxaparin  40 mg Subcutaneous Daily Rebeca Dugan, CRNP      folic acid  1 mg Oral Daily Rebeca Dugan, CRNP      furosemide  40 mg Intravenous Daily Rebeca Dugan, CRNP      ipratropium  0 5 mg Nebulization TID Mark Astorga, CRNP      levalbuterol  1 25 mg Nebulization TID Mark Astorga, CRNP      metoprolol tartrate  25 mg Oral Q12H [address], CRNP      multivitamin-minerals  1 tablet Oral Daily Rebeca Dugan, [address]      nicotine  21 mg Transdermal Daily Rebeca Dugan, CARMEN      ondansetron  4 mg Intravenous Q6H PRN Rebeca Dugan, MARIA EUGENIANP      predniSONE  40 mg Oral Daily Caron Sorensen MD      simethicone  80 mg Oral 4x Daily PRN Rebeca Dugan, CARMEN      spironolactone  12 5 mg Oral Daily Dave Parekh PA-C      thiamine  100 mg Oral Daily Rebeca Dugan, CARMEN          Today, Patient Was Seen By: Caron Sorensen MD    ** Please Note: Dictation voice to text software may have been used in the creation of this document   **

## 2022-06-05 LAB
ANION GAP SERPL CALCULATED.3IONS-SCNC: 8 MMOL/L (ref 4–13)
ANISOCYTOSIS BLD QL SMEAR: PRESENT
BASOPHILS # BLD MANUAL: 0 THOUSAND/UL (ref 0–0.1)
BASOPHILS NFR MAR MANUAL: 0 % (ref 0–1)
BUN SERPL-MCNC: 35 MG/DL (ref 5–25)
CALCIUM SERPL-MCNC: 8.8 MG/DL (ref 8.3–10.1)
CARDIAC TROPONIN I PNL SERPL HS: 23 NG/L
CHLORIDE SERPL-SCNC: 98 MMOL/L (ref 100–108)
CO2 SERPL-SCNC: 31 MMOL/L (ref 21–32)
CREAT SERPL-MCNC: 1.36 MG/DL (ref 0.6–1.3)
EOSINOPHIL # BLD MANUAL: 0.17 THOUSAND/UL (ref 0–0.4)
EOSINOPHIL NFR BLD MANUAL: 1 % (ref 0–6)
ERYTHROCYTE [DISTWIDTH] IN BLOOD BY AUTOMATED COUNT: 17.8 % (ref 11.6–15.1)
GFR SERPL CREATININE-BSD FRML MDRD: 42 ML/MIN/1.73SQ M
GLUCOSE SERPL-MCNC: 111 MG/DL (ref 65–140)
HCT VFR BLD AUTO: 38.6 % (ref 34.8–46.1)
HGB BLD-MCNC: 11.8 G/DL (ref 11.5–15.4)
LYMPHOCYTES # BLD AUTO: 0.99 THOUSAND/UL (ref 0.6–4.47)
LYMPHOCYTES # BLD AUTO: 6 % (ref 14–44)
MCH RBC QN AUTO: 24.9 PG (ref 26.8–34.3)
MCHC RBC AUTO-ENTMCNC: 30.6 G/DL (ref 31.4–37.4)
MCV RBC AUTO: 82 FL (ref 82–98)
MONOCYTES # BLD AUTO: 0 THOUSAND/UL (ref 0–1.22)
MONOCYTES NFR BLD: 0 % (ref 4–12)
NEUTROPHILS # BLD MANUAL: 15.36 THOUSAND/UL (ref 1.85–7.62)
NEUTS BAND NFR BLD MANUAL: 2 % (ref 0–8)
NEUTS SEG NFR BLD AUTO: 91 % (ref 43–75)
PLATELET # BLD AUTO: 594 THOUSANDS/UL (ref 149–390)
PLATELET BLD QL SMEAR: ABNORMAL
PMV BLD AUTO: 10.2 FL (ref 8.9–12.7)
POTASSIUM SERPL-SCNC: 5 MMOL/L (ref 3.5–5.3)
PROCALCITONIN SERPL-MCNC: 0.1 NG/ML
RBC # BLD AUTO: 4.73 MILLION/UL (ref 3.81–5.12)
SODIUM SERPL-SCNC: 137 MMOL/L (ref 136–145)
WBC # BLD AUTO: 16.52 THOUSAND/UL (ref 4.31–10.16)

## 2022-06-05 PROCEDURE — 99232 SBSQ HOSP IP/OBS MODERATE 35: CPT | Performed by: STUDENT IN AN ORGANIZED HEALTH CARE EDUCATION/TRAINING PROGRAM

## 2022-06-05 PROCEDURE — 85007 BL SMEAR W/DIFF WBC COUNT: CPT | Performed by: NURSE PRACTITIONER

## 2022-06-05 PROCEDURE — 85025 COMPLETE CBC W/AUTO DIFF WBC: CPT | Performed by: NURSE PRACTITIONER

## 2022-06-05 PROCEDURE — 94760 N-INVAS EAR/PLS OXIMETRY 1: CPT

## 2022-06-05 PROCEDURE — 80048 BASIC METABOLIC PNL TOTAL CA: CPT | Performed by: STUDENT IN AN ORGANIZED HEALTH CARE EDUCATION/TRAINING PROGRAM

## 2022-06-05 PROCEDURE — 99232 SBSQ HOSP IP/OBS MODERATE 35: CPT | Performed by: INTERNAL MEDICINE

## 2022-06-05 PROCEDURE — 84145 PROCALCITONIN (PCT): CPT | Performed by: NURSE PRACTITIONER

## 2022-06-05 PROCEDURE — 85027 COMPLETE CBC AUTOMATED: CPT | Performed by: NURSE PRACTITIONER

## 2022-06-05 PROCEDURE — 94640 AIRWAY INHALATION TREATMENT: CPT

## 2022-06-05 PROCEDURE — 84484 ASSAY OF TROPONIN QUANT: CPT | Performed by: EMERGENCY MEDICINE

## 2022-06-05 RX ORDER — HYDROXYZINE HYDROCHLORIDE 25 MG/1
25 TABLET, FILM COATED ORAL EVERY 6 HOURS PRN
Status: DISCONTINUED | OUTPATIENT
Start: 2022-06-05 | End: 2022-06-07 | Stop reason: HOSPADM

## 2022-06-05 RX ORDER — POLYETHYLENE GLYCOL 3350 17 G/17G
17 POWDER, FOR SOLUTION ORAL DAILY
Status: DISCONTINUED | OUTPATIENT
Start: 2022-06-05 | End: 2022-06-07 | Stop reason: HOSPADM

## 2022-06-05 RX ORDER — SENNOSIDES 8.6 MG
2 TABLET ORAL
Status: DISCONTINUED | OUTPATIENT
Start: 2022-06-05 | End: 2022-06-07 | Stop reason: HOSPADM

## 2022-06-05 RX ADMIN — AZITHROMYCIN MONOHYDRATE 500 MG: 250 TABLET ORAL at 09:24

## 2022-06-05 RX ADMIN — PREDNISONE 40 MG: 20 TABLET ORAL at 09:24

## 2022-06-05 RX ADMIN — IPRATROPIUM BROMIDE 0.5 MG: 0.5 SOLUTION RESPIRATORY (INHALATION) at 07:05

## 2022-06-05 RX ADMIN — IPRATROPIUM BROMIDE 0.5 MG: 0.5 SOLUTION RESPIRATORY (INHALATION) at 13:09

## 2022-06-05 RX ADMIN — Medication 1 TABLET: at 09:24

## 2022-06-05 RX ADMIN — METOPROLOL TARTRATE 25 MG: 25 TABLET, FILM COATED ORAL at 21:32

## 2022-06-05 RX ADMIN — LOSARTAN POTASSIUM 25 MG: 25 TABLET, FILM COATED ORAL at 09:24

## 2022-06-05 RX ADMIN — IPRATROPIUM BROMIDE 0.5 MG: 0.5 SOLUTION RESPIRATORY (INHALATION) at 19:25

## 2022-06-05 RX ADMIN — Medication 21 MG: at 09:25

## 2022-06-05 RX ADMIN — LEVALBUTEROL 1.25 MG: 1.25 SOLUTION, CONCENTRATE RESPIRATORY (INHALATION) at 13:09

## 2022-06-05 RX ADMIN — POLYETHYLENE GLYCOL 3350 17 G: 17 POWDER, FOR SOLUTION ORAL at 16:11

## 2022-06-05 RX ADMIN — THIAMINE HCL TAB 100 MG 100 MG: 100 TAB at 09:24

## 2022-06-05 RX ADMIN — FUROSEMIDE 40 MG: 10 INJECTION, SOLUTION INTRAMUSCULAR; INTRAVENOUS at 09:24

## 2022-06-05 RX ADMIN — FOLIC ACID 1 MG: 1 TABLET ORAL at 09:24

## 2022-06-05 RX ADMIN — AMOXICILLIN AND CLAVULANATE POTASSIUM 1 TABLET: 875; 125 TABLET, FILM COATED ORAL at 21:32

## 2022-06-05 RX ADMIN — LEVALBUTEROL 1.25 MG: 1.25 SOLUTION, CONCENTRATE RESPIRATORY (INHALATION) at 19:25

## 2022-06-05 RX ADMIN — METOPROLOL TARTRATE 25 MG: 25 TABLET, FILM COATED ORAL at 09:24

## 2022-06-05 RX ADMIN — LEVALBUTEROL 1.25 MG: 1.25 SOLUTION, CONCENTRATE RESPIRATORY (INHALATION) at 07:05

## 2022-06-05 RX ADMIN — SPIRONOLACTONE 12.5 MG: 25 TABLET, FILM COATED ORAL at 09:24

## 2022-06-05 RX ADMIN — AMOXICILLIN AND CLAVULANATE POTASSIUM 1 TABLET: 875; 125 TABLET, FILM COATED ORAL at 09:24

## 2022-06-05 RX ADMIN — ASPIRIN 81 MG: 81 TABLET, COATED ORAL at 09:24

## 2022-06-05 RX ADMIN — SENNOSIDES 17.2 MG: 8.6 TABLET ORAL at 21:32

## 2022-06-05 NOTE — PROGRESS NOTES
[facility]  Progress Note - Nayeli Jean-Baptiste 1960, 64 y o  female MRN: [id number]  Unit/Bed#: E4 -01 Encounter: [id number]  Primary Care Provider: Calvin Sharma DO   Date and time admitted to hospital: 6/2/2022  9:49 PM    * Shortness of breath  Assessment & Plan  · Reports dyspnea, JACKSON, orthopnea and edema  · Elevated proBNP 9000 on admission  · Imaging consistent with heart failure, pleural effusions   · Diuresed with IV Lasix, cardiology following  · 2D echo showing  EF 66%, grade 3 diastolic dysfunction  · Systolic and diastolic heart failure  · Continue aspirin, beta-blockers, add aldactone, will need ACE/ARB  · Appears euvolemic, hold diuresis, no labs drawn today  · Plan for further ischemic testing on Monday, cardiac cath tmr  · NPO midnight    COPD (chronic obstructive pulmonary disease) ([facility] [address] )  Assessment & Plan  Suspected likely COPD exacerbation  Wean IV steroids, transition to PO prednisone for 3 days  Continue bronchodilators  Will need follow up with pulmonology outpatient for PFTs    Tooth infection  Assessment & Plan  Recently diagnosed outpatient with tooth infection, only took 2 days of abx and stopped due to GI upset  Continue augmentin for 7 days, currently tolerating  Recommend outpatient follow up with dentist    Alcohol ingestion, 1-4 drinks per day  Assessment & Plan  · Patient reports drinking several drinks of alcohol daily  · CIWA protocol, patient denies hx of withdrawals    Smokes cigarettes  Assessment & Plan  · Smokes >1/2 PPD; reports readiness to quit, last use 2 weeks prior   · Tobacco cessation education, nicotine patch    Mediastinal lymphadenopathy  Assessment & Plan  · CTA chest shows mediastinal lymphadenopathy  · Per pulmonology, recommend repeat CT imaging in 3 months  · Will need follow up outpatient with PCP or Pulmonology for lung screenings        VTE Pharmacologic Prophylaxis:   Pharmacologic: Enoxaparin (Lovenox)  Mechanical VTE Prophylaxis in Place: Yes    Patient Centered Rounds: I have performed bedside rounds with nursing staff today  Discussions with Specialists or Other Care Team Provider: Cardiology    Education and Discussions with Family / Patient: patient    Time Spent for Care: 30 minutes  More than 50% of total time spent on counseling and coordination of care as described above  Current Length of Stay: 2 day(s)    Current Patient Status: Inpatient   Certification Statement: The patient will continue to require additional inpatient hospital stay due to plan for cardiac cath tmr    Discharge Plan: pending    Code Status: Level 1 - Full Code      Subjective:   No events overnight  Breathing appears to have improved and reports at baseline  No complaints  Discussed importance of medications aspirin and statin  Difficult IV stick, plan for US for blood draws  Objective:     Vitals:   Temp (24hrs), Av 6 °F (37 °C), Min:97 8 °F (36 6 °C), Max:99 3 °F (37 4 °C)    Temp:  [97 8 °F (36 6 °C)-99 3 °F (37 4 °C)] 99 2 °F (37 3 °C)  HR:  [] 89  Resp:  [18] 18  BP: (126-148)/(67-73) 135/70  SpO2:  [95 %-97 %] 96 %  Body mass index is 25 22 kg/m²  Input and Output Summary (last 24 hours):     No intake or output data in the 24 hours ending 22 1323    Physical Exam:     Physical Exam  Vitals and nursing note reviewed  Constitutional:       Appearance: Normal appearance  She is normal weight  HENT:      Head: Normocephalic and atraumatic  Eyes:      General: No scleral icterus  Conjunctiva/sclera: Conjunctivae normal    Cardiovascular:      Rate and Rhythm: Normal rate and regular rhythm  Heart sounds: Normal heart sounds  Pulmonary:      Effort: Pulmonary effort is normal       Breath sounds: No wheezing or rales  Abdominal:      General: Bowel sounds are normal  There is no distension  Palpations: Abdomen is soft  Musculoskeletal:         General: No swelling        Right lower leg: No edema  Left lower leg: No edema  Skin:     General: Skin is warm and dry  Neurological:      General: No focal deficit present  Mental Status: She is alert  Mental status is at baseline  Psychiatric:         Mood and Affect: Mood normal        Additional Data:     Labs:    Results from last 7 days   Lab Units 06/02/22  2234   WBC Thousand/uL 12 88*   HEMOGLOBIN g/dL 11 7   HEMATOCRIT % 38 1   PLATELETS Thousands/uL 486*   NEUTROS PCT % 66   LYMPHS PCT % 21   MONOS PCT % 9   EOS PCT % 2     Results from last 7 days   Lab Units 06/02/22  2234   SODIUM mmol/L 137   POTASSIUM mmol/L 4 8   CHLORIDE mmol/L 101   CO2 mmol/L 26   BUN mg/dL 17   CREATININE mg/dL 0 87   ANION GAP mmol/L 10   CALCIUM mg/dL 8 7   GLUCOSE RANDOM mg/dL 105                 Results from last 7 days   Lab Units 06/02/22  2305   LACTIC ACID mmol/L 1 2           * I Have Reviewed All Lab Data Listed Above  * Additional Pertinent Lab Tests Reviewed: [address] Admission Reviewed    Imaging:    XR chest 2 views    Result Date: 6/3/2022  Impression: Right basal opacity suspicious for infiltrate/atelectasis and small effusion Workstation performed: [id number]     CTA ED chest PE study    Result Date: 6/3/2022  Impression: No evidence of pulmonary artery embolus Trace right-sided pleural effusion Mild right lower lobe bronchitis Mediastinal lymphadenopathy  Follow-up is recommended  Workstation performed: [id number]       Recent Cultures (last 7 days):     Results from last 7 days   Lab Units 06/02/22  2305   BLOOD CULTURE  No Growth at 48 hrs  No Growth at 48 hrs         Last 24 Hours Medication List:   Current Facility-Administered Medications   Medication Dose Route Frequency Provider Last Rate    aluminum-magnesium hydroxide-simethicone  30 mL Oral Q6H PRN Elana Quiet, CRNP      amoxicillin-clavulanate  1 tablet Oral Q12H [address] Aden Garrison MD      aspirin  81 mg Oral Daily MD Romel Mcdonald enoxaparin  40 mg Subcutaneous Daily CARMEN Low      folic acid  1 mg Oral Daily CARMEN Low      ipratropium  0 5 mg Nebulization TID CARMEN Burkett      levalbuterol  1 25 mg Nebulization TID CARMEN Burkett      losartan  25 mg Oral Daily Sav Casper MD      metoprolol tartrate  25 mg Oral Q12H [address], CRNP      multivitamin-minerals  1 tablet Oral Daily Dinesh eJan, [address]      nicotine  21 mg Transdermal Daily CARMEN Low      ondansetron  4 mg Intravenous Q6H PRN CARMEN Low      predniSONE  40 mg Oral Daily Citlaly East MD      simethicone  80 mg Oral 4x Daily PRN CARMEN Low      spironolactone  12 5 mg Oral Daily Myra Zepeda PA-C      thiamine  100 mg Oral Daily CARMEN Low          Today, Patient Was Seen By: Citlaly East MD    ** Please Note: Dictation voice to text software may have been used in the creation of this document   **

## 2022-06-05 NOTE — ASSESSMENT & PLAN NOTE
· Reports dyspnea, JACKSON, orthopnea and edema  · Elevated proBNP 9000 on admission  · Imaging consistent with heart failure, pleural effusions   · Diuresed with IV Lasix, cardiology following  · 2D echo showing  EF 50%, grade 3 diastolic dysfunction  · Systolic and diastolic heart failure  · Continue aspirin, beta-blockers, add aldactone, will need ACE/ARB  · Appears euvolemic, hold diuresis, no labs drawn today  · Plan for further ischemic testing on Monday, cardiac cath tmr  · NPO midnight

## 2022-06-05 NOTE — ASSESSMENT & PLAN NOTE
Recently diagnosed outpatient with tooth infection, only took 2 days of abx and stopped due to GI upset  Continue augmentin for 7 days, currently tolerating  Recommend outpatient follow up with dentist

## 2022-06-05 NOTE — PROGRESS NOTES
Progress Note - Anahi Vizcaino 64 y o  female MRN: [id number]    Unit/Bed#: E4 -01 Encounter: [id number]  Subjective:   No chest pain or SOB  Shayla Ga no edema  No palpitations or dizziness  Has agreed to cath tomorrow    Objective:   Vitals: Blood pressure 108/64, pulse 99, temperature 98 °F (36 7 °C), temperature source Temporal, resp  rate 18, height 5' 2" (1 575 m), weight 62 6 kg (137 lb 14 4 oz), SpO2 99 %  ,Body mass index is 25 22 kg/m²  CBC with diff:   Results from last 7 days   Lab Units 06/05/22  1334   WBC Thousand/uL 16 52*   RBC Million/uL 4 73   HEMOGLOBIN g/dL 11 8   HEMATOCRIT % 38 6   MCV fL 82   MCH pg 24 9*   MCHC g/dL 30 6*   RDW % 17 8*   MPV fL 10 2   PLATELETS Thousands/uL 594*       No intake or output data in the 24 hours ending 06/05/22 1658    Physical exam:  Lungs-few rales at the right base  No wheezing or rhonchi  Heart-regular without murmur rub or gallop  Abdomen-nontender without mass organomegaly  Extremities-no edema    Assessment:  1  Dilated cardiomyopathy  New diagnosis  Likely related to alcohol excess  Patient on metoprolol 25 mg q 12 hours, spironolactone 12 5 mg daily and losartan 25 mg daily  2  Acute systolic congestive heart failure  Gradually diuresing with furosemide 40 mg IV daily  Feeling better  3  Longstanding smoking history    Plan:  NPO after midnight for cardiac catheterization tomorrow-indication yet unexplained cardiomyopathy to rule out obstructive coronary disease in patient with risk factors for CAD mainly age and longstanding smoking  Discussed procedure with patient including risks of  Death, vascular injury, stroke, myocardial infarction, bleeding, thrombosis, pseudoaneurysm, allergy, infection and neural injury  We did discuss the potential for catheter based intervention including the increased risk of myocardial infarction with acute closure and need for urgent surgery at increased risk as well as the risk of restenoses    Will hold Lasix in a m  Continue metoprolol, spironolactone and losartan        Joe Casey MD

## 2022-06-06 PROBLEM — I27.22: Status: RESOLVED | Noted: 2022-06-06 | Resolved: 2022-06-06

## 2022-06-06 PROBLEM — I42.8 NON-ISCHEMIC CARDIOMYOPATHY (HCC): Status: ACTIVE | Noted: 2022-06-03

## 2022-06-06 PROBLEM — I27.22: Status: ACTIVE | Noted: 2022-06-06

## 2022-06-06 PROBLEM — R65.10 SIRS (SYSTEMIC INFLAMMATORY RESPONSE SYNDROME) (HCC): Status: RESOLVED | Noted: 2022-06-03 | Resolved: 2022-06-06

## 2022-06-06 LAB
ANION GAP SERPL CALCULATED.3IONS-SCNC: 8 MMOL/L (ref 4–13)
BASOPHILS # BLD AUTO: 0.11 THOUSANDS/ΜL (ref 0–0.1)
BASOPHILS NFR BLD AUTO: 1 % (ref 0–1)
BUN SERPL-MCNC: 35 MG/DL (ref 5–25)
CALCIUM SERPL-MCNC: 8.5 MG/DL (ref 8.3–10.1)
CHLORIDE SERPL-SCNC: 100 MMOL/L (ref 100–108)
CO2 SERPL-SCNC: 30 MMOL/L (ref 21–32)
CREAT SERPL-MCNC: 1.24 MG/DL (ref 0.6–1.3)
EOSINOPHIL # BLD AUTO: 0.18 THOUSAND/ΜL (ref 0–0.61)
EOSINOPHIL NFR BLD AUTO: 1 % (ref 0–6)
ERYTHROCYTE [DISTWIDTH] IN BLOOD BY AUTOMATED COUNT: 17.9 % (ref 11.6–15.1)
FLUAV RNA RESP QL NAA+PROBE: NEGATIVE
FLUBV RNA RESP QL NAA+PROBE: NEGATIVE
GFR SERPL CREATININE-BSD FRML MDRD: 46 ML/MIN/1.73SQ M
GLUCOSE SERPL-MCNC: 88 MG/DL (ref 65–140)
HCT VFR BLD AUTO: 36.6 % (ref 34.8–46.1)
HGB BLD-MCNC: 11.2 G/DL (ref 11.5–15.4)
IMM GRANULOCYTES # BLD AUTO: 0.12 THOUSAND/UL (ref 0–0.2)
IMM GRANULOCYTES NFR BLD AUTO: 1 % (ref 0–2)
LYMPHOCYTES # BLD AUTO: 3.6 THOUSANDS/ΜL (ref 0.6–4.47)
LYMPHOCYTES NFR BLD AUTO: 24 % (ref 14–44)
MCH RBC QN AUTO: 24.3 PG (ref 26.8–34.3)
MCHC RBC AUTO-ENTMCNC: 30.6 G/DL (ref 31.4–37.4)
MCV RBC AUTO: 79 FL (ref 82–98)
MONOCYTES # BLD AUTO: 1.33 THOUSAND/ΜL (ref 0.17–1.22)
MONOCYTES NFR BLD AUTO: 9 % (ref 4–12)
NEUTROPHILS # BLD AUTO: 9.98 THOUSANDS/ΜL (ref 1.85–7.62)
NEUTS SEG NFR BLD AUTO: 64 % (ref 43–75)
NRBC BLD AUTO-RTO: 0 /100 WBCS
PLATELET # BLD AUTO: 583 THOUSANDS/UL (ref 149–390)
PMV BLD AUTO: 10.1 FL (ref 8.9–12.7)
POTASSIUM SERPL-SCNC: 3.8 MMOL/L (ref 3.5–5.3)
RBC # BLD AUTO: 4.61 MILLION/UL (ref 3.81–5.12)
RSV RNA RESP QL NAA+PROBE: NEGATIVE
SARS-COV-2 RNA RESP QL NAA+PROBE: NEGATIVE
SODIUM SERPL-SCNC: 138 MMOL/L (ref 136–145)
T4 FREE SERPL-MCNC: 0.89 NG/DL (ref 0.76–1.46)
TSH SERPL DL<=0.05 MIU/L-ACNC: 5.64 UIU/ML (ref 0.45–4.5)
WBC # BLD AUTO: 15.32 THOUSAND/UL (ref 4.31–10.16)

## 2022-06-06 PROCEDURE — 0241U HB NFCT DS VIR RESP RNA 4 TRGT: CPT | Performed by: STUDENT IN AN ORGANIZED HEALTH CARE EDUCATION/TRAINING PROGRAM

## 2022-06-06 PROCEDURE — 93454 CORONARY ARTERY ANGIO S&I: CPT | Performed by: INTERNAL MEDICINE

## 2022-06-06 PROCEDURE — 84443 ASSAY THYROID STIM HORMONE: CPT | Performed by: STUDENT IN AN ORGANIZED HEALTH CARE EDUCATION/TRAINING PROGRAM

## 2022-06-06 PROCEDURE — C1894 INTRO/SHEATH, NON-LASER: HCPCS | Performed by: INTERNAL MEDICINE

## 2022-06-06 PROCEDURE — 94760 N-INVAS EAR/PLS OXIMETRY 1: CPT

## 2022-06-06 PROCEDURE — 85025 COMPLETE CBC W/AUTO DIFF WBC: CPT | Performed by: STUDENT IN AN ORGANIZED HEALTH CARE EDUCATION/TRAINING PROGRAM

## 2022-06-06 PROCEDURE — 94640 AIRWAY INHALATION TREATMENT: CPT

## 2022-06-06 PROCEDURE — 84439 ASSAY OF FREE THYROXINE: CPT | Performed by: STUDENT IN AN ORGANIZED HEALTH CARE EDUCATION/TRAINING PROGRAM

## 2022-06-06 PROCEDURE — 99152 MOD SED SAME PHYS/QHP 5/>YRS: CPT | Performed by: INTERNAL MEDICINE

## 2022-06-06 PROCEDURE — B2111ZZ FLUOROSCOPY OF MULTIPLE CORONARY ARTERIES USING LOW OSMOLAR CONTRAST: ICD-10-PCS | Performed by: INTERNAL MEDICINE

## 2022-06-06 PROCEDURE — 80048 BASIC METABOLIC PNL TOTAL CA: CPT | Performed by: STUDENT IN AN ORGANIZED HEALTH CARE EDUCATION/TRAINING PROGRAM

## 2022-06-06 PROCEDURE — 99232 SBSQ HOSP IP/OBS MODERATE 35: CPT | Performed by: INTERNAL MEDICINE

## 2022-06-06 PROCEDURE — NC001 PR NO CHARGE: Performed by: INTERNAL MEDICINE

## 2022-06-06 PROCEDURE — C1769 GUIDE WIRE: HCPCS | Performed by: INTERNAL MEDICINE

## 2022-06-06 RX ORDER — FENTANYL CITRATE 50 UG/ML
INJECTION, SOLUTION INTRAMUSCULAR; INTRAVENOUS AS NEEDED
Status: DISCONTINUED | OUTPATIENT
Start: 2022-06-06 | End: 2022-06-06 | Stop reason: HOSPADM

## 2022-06-06 RX ORDER — SODIUM CHLORIDE 9 MG/ML
50 INJECTION, SOLUTION INTRAVENOUS CONTINUOUS
Status: DISCONTINUED | OUTPATIENT
Start: 2022-06-06 | End: 2022-06-06

## 2022-06-06 RX ORDER — METOPROLOL SUCCINATE 50 MG/1
50 TABLET, EXTENDED RELEASE ORAL DAILY
Status: DISCONTINUED | OUTPATIENT
Start: 2022-06-06 | End: 2022-06-07 | Stop reason: HOSPADM

## 2022-06-06 RX ORDER — HEPARIN SODIUM 1000 [USP'U]/ML
INJECTION, SOLUTION INTRAVENOUS; SUBCUTANEOUS AS NEEDED
Status: DISCONTINUED | OUTPATIENT
Start: 2022-06-06 | End: 2022-06-06 | Stop reason: HOSPADM

## 2022-06-06 RX ORDER — MIDAZOLAM HYDROCHLORIDE 2 MG/2ML
INJECTION, SOLUTION INTRAMUSCULAR; INTRAVENOUS AS NEEDED
Status: DISCONTINUED | OUTPATIENT
Start: 2022-06-06 | End: 2022-06-06 | Stop reason: HOSPADM

## 2022-06-06 RX ORDER — LIDOCAINE HYDROCHLORIDE 10 MG/ML
INJECTION, SOLUTION EPIDURAL; INFILTRATION; INTRACAUDAL; PERINEURAL AS NEEDED
Status: DISCONTINUED | OUTPATIENT
Start: 2022-06-06 | End: 2022-06-06 | Stop reason: HOSPADM

## 2022-06-06 RX ORDER — ONDANSETRON 2 MG/ML
INJECTION INTRAMUSCULAR; INTRAVENOUS AS NEEDED
Status: DISCONTINUED | OUTPATIENT
Start: 2022-06-06 | End: 2022-06-06 | Stop reason: HOSPADM

## 2022-06-06 RX ORDER — FUROSEMIDE 10 MG/ML
40 INJECTION INTRAMUSCULAR; INTRAVENOUS ONCE
Status: COMPLETED | OUTPATIENT
Start: 2022-06-06 | End: 2022-06-06

## 2022-06-06 RX ORDER — LANOLIN ALCOHOL/MO/W.PET/CERES
6 CREAM (GRAM) TOPICAL
Status: DISCONTINUED | OUTPATIENT
Start: 2022-06-06 | End: 2022-06-07 | Stop reason: HOSPADM

## 2022-06-06 RX ORDER — VERAPAMIL HCL 2.5 MG/ML
AMPUL (ML) INTRAVENOUS AS NEEDED
Status: DISCONTINUED | OUTPATIENT
Start: 2022-06-06 | End: 2022-06-06 | Stop reason: HOSPADM

## 2022-06-06 RX ORDER — NITROGLYCERIN 20 MG/100ML
INJECTION INTRAVENOUS AS NEEDED
Status: DISCONTINUED | OUTPATIENT
Start: 2022-06-06 | End: 2022-06-06 | Stop reason: HOSPADM

## 2022-06-06 RX ORDER — SODIUM CHLORIDE 9 MG/ML
INJECTION, SOLUTION INTRAVENOUS
Status: COMPLETED | OUTPATIENT
Start: 2022-06-06 | End: 2022-06-06

## 2022-06-06 RX ORDER — LOSARTAN POTASSIUM 25 MG/1
25 TABLET ORAL DAILY
Status: DISCONTINUED | OUTPATIENT
Start: 2022-06-07 | End: 2022-06-07 | Stop reason: HOSPADM

## 2022-06-06 RX ORDER — SPIRONOLACTONE 25 MG/1
12.5 TABLET ORAL DAILY
Status: DISCONTINUED | OUTPATIENT
Start: 2022-06-07 | End: 2022-06-07 | Stop reason: HOSPADM

## 2022-06-06 RX ADMIN — IPRATROPIUM BROMIDE 0.5 MG: 0.5 SOLUTION RESPIRATORY (INHALATION) at 13:25

## 2022-06-06 RX ADMIN — THIAMINE HCL TAB 100 MG 100 MG: 100 TAB at 08:16

## 2022-06-06 RX ADMIN — IPRATROPIUM BROMIDE 0.5 MG: 0.5 SOLUTION RESPIRATORY (INHALATION) at 19:44

## 2022-06-06 RX ADMIN — HYDROXYZINE HYDROCHLORIDE 25 MG: 25 TABLET ORAL at 21:34

## 2022-06-06 RX ADMIN — AMOXICILLIN AND CLAVULANATE POTASSIUM 1 TABLET: 875; 125 TABLET, FILM COATED ORAL at 21:34

## 2022-06-06 RX ADMIN — Medication 1 TABLET: at 08:17

## 2022-06-06 RX ADMIN — ASPIRIN 81 MG: 81 TABLET, COATED ORAL at 08:15

## 2022-06-06 RX ADMIN — AMOXICILLIN AND CLAVULANATE POTASSIUM 1 TABLET: 875; 125 TABLET, FILM COATED ORAL at 08:16

## 2022-06-06 RX ADMIN — SPIRONOLACTONE 12.5 MG: 25 TABLET, FILM COATED ORAL at 08:16

## 2022-06-06 RX ADMIN — FOLIC ACID 1 MG: 1 TABLET ORAL at 08:17

## 2022-06-06 RX ADMIN — METOPROLOL SUCCINATE 50 MG: 50 TABLET, EXTENDED RELEASE ORAL at 17:07

## 2022-06-06 RX ADMIN — PREDNISONE 40 MG: 20 TABLET ORAL at 08:16

## 2022-06-06 RX ADMIN — SENNOSIDES 17.2 MG: 8.6 TABLET ORAL at 21:34

## 2022-06-06 RX ADMIN — LOSARTAN POTASSIUM 25 MG: 25 TABLET, FILM COATED ORAL at 08:17

## 2022-06-06 RX ADMIN — MELATONIN 6 MG: at 21:40

## 2022-06-06 RX ADMIN — LEVALBUTEROL 1.25 MG: 1.25 SOLUTION, CONCENTRATE RESPIRATORY (INHALATION) at 19:44

## 2022-06-06 RX ADMIN — SODIUM CHLORIDE 50 ML/HR: 0.9 INJECTION, SOLUTION INTRAVENOUS at 09:55

## 2022-06-06 RX ADMIN — FUROSEMIDE 40 MG: 10 INJECTION, SOLUTION INTRAVENOUS at 12:07

## 2022-06-06 RX ADMIN — MELATONIN 6 MG: at 00:30

## 2022-06-06 RX ADMIN — IPRATROPIUM BROMIDE 0.5 MG: 0.5 SOLUTION RESPIRATORY (INHALATION) at 07:50

## 2022-06-06 RX ADMIN — LEVALBUTEROL 1.25 MG: 1.25 SOLUTION, CONCENTRATE RESPIRATORY (INHALATION) at 07:50

## 2022-06-06 RX ADMIN — POLYETHYLENE GLYCOL 3350 17 G: 17 POWDER, FOR SOLUTION ORAL at 12:07

## 2022-06-06 RX ADMIN — HYDROXYZINE HYDROCHLORIDE 25 MG: 25 TABLET ORAL at 08:37

## 2022-06-06 RX ADMIN — LEVALBUTEROL 1.25 MG: 1.25 SOLUTION, CONCENTRATE RESPIRATORY (INHALATION) at 13:25

## 2022-06-06 RX ADMIN — Medication 21 MG: at 08:14

## 2022-06-06 RX ADMIN — METOPROLOL TARTRATE 25 MG: 25 TABLET, FILM COATED ORAL at 08:16

## 2022-06-06 RX ADMIN — HYDROXYZINE HYDROCHLORIDE 25 MG: 25 TABLET ORAL at 00:00

## 2022-06-06 NOTE — ASSESSMENT & PLAN NOTE
· Reports dyspnea, JACKSON, orthopnea and edema  · Elevated proBNP 9000 on admission  · Imaging consistent with heart failure, pleural effusions   · Diuresed with IV Lasix, cardiology following  · 2D echo showing  EF 23%, grade 3 diastolic dysfunction  · Systolic and diastolic heart failure  · Continue aspirin, beta-blockers, add aldactone, will need ACE/ARB  · Cardiac catheterization with no evidence of obstructive pathology  · Continue fluid restriction and low-sodium diet at discharge

## 2022-06-06 NOTE — PROGRESS NOTES
Progress Note - Pulmonary   Lillian Tenriism 64 y o  female MRN: [id number]  Unit/Bed#: E4 -01 Encounter: [id number]    Assessment/Plan:    1  Acute pulmonary insufficiency likely multifaceted as listed below       -  patient remains on room air-94%       -  maintain saturations greater than 88%       -  pulmonary toileting:  Deep breathing cough, OOB as tolerated, IS Q 1 hr    2  COPD of unknown severity with possible acute exacerbation       -  completed 3 day prednisone burst       -  patient wished to follow up with [facility]       -  would likely recommend discharge on LABA/LAMA, however will defer to her outpatient Pulmonary team    3  Acute grade 3 diastolic CHF with significantly reduced EF       -  6/3/2022-  EF 28%          increased intracardiac pressures       -  6/6/2022- left heart catheterization- normal cardiac vessels       -  cardiology following- IV Diuril, aspirin, metoprolol, spironolactone    5  Tobacco abuse        -  patient reports she quit smoking 2 weeks ago        -  encourage in educated on tobacco cessation        -  NRT per primary service      - patient will follow-up with [facility]  - pulmonary will sign off    Chief Complaint:    "I feel a lot better"    Subjective:    Mikayla Og was comfortably sitting in her bed  She reports she overall feels significantly better since admission  No significant overnight events reported  Patient currently denies any fever, chills hemoptysis, headaches, night sweats, pleuritic chest pain, or palpitations  Objective:    Vitals: Blood pressure 129/96, pulse 83, temperature 98 2 °F (36 8 °C), temperature source Temporal, resp  rate 18, height 5' 2" (1 575 m), weight 62 3 kg (137 lb 6 4 oz), SpO2 100 %  RA,Body mass index is 25 13 kg/m²      No intake or output data in the 24 hours ending 06/06/22 1243    Invasive Devices  Report    Peripheral Intravenous Line  Duration           Peripheral IV 06/02/22 Right Antecubital 3 days                Physical Exam:   Physical Exam  Constitutional:       General: She is not in acute distress  Appearance: Normal appearance  She is normal weight  She is not ill-appearing  HENT:      Head: Normocephalic and atraumatic  Nose: Nose normal  No congestion or rhinorrhea  Mouth/Throat:      Mouth: Mucous membranes are dry  Pharynx: Oropharynx is clear  No oropharyngeal exudate or posterior oropharyngeal erythema  Cardiovascular:      Rate and Rhythm: Normal rate and regular rhythm  Pulses: Normal pulses  Heart sounds: Normal heart sounds  No murmur heard  No friction rub  No gallop  Pulmonary:      Effort: Pulmonary effort is normal  No tachypnea, bradypnea, accessory muscle usage or respiratory distress  Breath sounds: Decreased air movement present  No stridor or transmitted upper airway sounds  Decreased breath sounds present  No wheezing, rhonchi or rales  Comments: Clear breath sounds  Chest:      Chest wall: No tenderness  Abdominal:      General: Abdomen is flat  Bowel sounds are normal  There is no distension  Palpations: Abdomen is soft  There is no mass  Musculoskeletal:         General: No swelling or tenderness  Normal range of motion  Cervical back: Normal range of motion  No rigidity or tenderness  Skin:     General: Skin is warm and dry  Coloration: Skin is not jaundiced or pale  Neurological:      General: No focal deficit present  Mental Status: She is alert and oriented to person, place, and time  Mental status is at baseline  Psychiatric:         Mood and Affect: Mood normal          Behavior: Behavior normal          Labs:    I have personally reviewed pertinent lab results CBC:   Lab Results   Component Value Date    WBC 15 32 (H) 06/06/2022    HGB 11 2 (L) 06/06/2022    HCT 36 6 06/06/2022    MCV 79 (L) 06/06/2022     (H) 06/06/2022    MCH 24 3 (L) 06/06/2022    MCHC 30 6 (L) 06/06/2022    RDW 17 9 (H) 06/06/2022    MPV 10 1 06/06/2022    NRBC 0 06/06/2022   , CMP:   Lab Results   Component Value Date    SODIUM 138 06/06/2022    K 3 8 06/06/2022     06/06/2022    CO2 30 06/06/2022    BUN 35 (H) 06/06/2022    CREATININE 1 24 06/06/2022    CALCIUM 8 5 06/06/2022    EGFR 46 06/06/2022       Imaging and other studies: I have personally reviewed pertinent films in PACS     CTA chest 06/03/2022-trace right-sided pleural effusion mid right lower lobe bronchitis mediastinal lymphadenopathy

## 2022-06-06 NOTE — PLAN OF CARE
Problem: Potential for Falls  Goal: Patient will remain free of falls  Description: INTERVENTIONS:  - Educate patient/family on patient safety including physical limitations  - Instruct patient to call for assistance with activity   - Consult OT/PT to assist with strengthening/mobility   - Keep Call bell within reach  - Keep bed low and locked with side rails adjusted as appropriate  - Keep care items and personal belongings within reach  - Initiate and maintain comfort rounds  - Make Fall Risk Sign visible to staff  - Offer Toileting every 2 Hours, in advance of need  - Initiate/Maintain bed alarm  - Obtain necessary fall risk management equipment:   - Apply yellow socks and bracelet for high fall risk patients  - Consider moving patient to room near nurses station  Outcome: Progressing     Problem: PAIN - ADULT  Goal: Verbalizes/displays adequate comfort level or baseline comfort level  Description: Interventions:  - Encourage patient to monitor pain and request assistance  - Assess pain using appropriate pain scale  - Administer analgesics based on type and severity of pain and evaluate response  - Implement non-pharmacological measures as appropriate and evaluate response  - Consider cultural and social influences on pain and pain management  - Notify physician/advanced practitioner if interventions unsuccessful or patient reports new pain  Outcome: Progressing     Problem: INFECTION - ADULT  Goal: Absence or prevention of progression during hospitalization  Description: INTERVENTIONS:  - Assess and monitor for signs and symptoms of infection  - Monitor lab/diagnostic results  - Monitor all insertion sites, i e  indwelling lines, tubes, and drains  - Monitor endotracheal if appropriate and nasal secretions for changes in amount and color  - [place] appropriate cooling/warming therapies per order  - Administer medications as ordered  - Instruct and encourage patient and family to use good hand hygiene technique  - Identify and instruct in appropriate isolation precautions for identified infection/condition  Outcome: Progressing  Goal: Absence of fever/infection during neutropenic period  Description: INTERVENTIONS:  - Monitor WBC    Outcome: Progressing     Problem: SAFETY ADULT  Goal: Patient will remain free of falls  Description: INTERVENTIONS:  - Educate patient/family on patient safety including physical limitations  - Instruct patient to call for assistance with activity   - Consult OT/PT to assist with strengthening/mobility   - Keep Call bell within reach  - Keep bed low and locked with side rails adjusted as appropriate  - Keep care items and personal belongings within reach  - Initiate and maintain comfort rounds  - Make Fall Risk Sign visible to staff  - Offer Toileting every 2 Hours, in advance of need  - Initiate/Maintain bed alarm  - Obtain necessary fall risk management equipment:  - Apply yellow socks and bracelet for high fall risk patients  - Consider moving patient to room near nurses station  Outcome: Progressing  Goal: Maintain or return to baseline ADL function  Description: INTERVENTIONS:  -  Assess patient's ability to carry out ADLs; assess patient's baseline for ADL function and identify physical deficits which impact ability to perform ADLs (bathing, care of mouth/teeth, toileting, grooming, dressing, etc )  - Assess/evaluate cause of self-care deficits   - Assess range of motion  - Assess patient's mobility; develop plan if impaired  - Assess patient's need for assistive devices and provide as appropriate  - Encourage maximum independence but intervene and supervise when necessary  - Involve family in performance of ADLs  - Assess for home care needs following discharge   - Consider OT consult to assist with ADL evaluation and planning for discharge  - Provide patient education as appropriate  Outcome: Progressing  Goal: Maintains/Returns to pre admission functional level  Description: INTERVENTIONS:  - Perform BMAT or MOVE assessment daily    - Set and communicate daily mobility goal to care team and patient/family/caregiver  - Collaborate with rehabilitation services on mobility goals if consulted  - Perform Range of Motion 3 times a day  - Reposition patient every 3 hours  - Dangle patient 3 times a day  - Stand patient 3 times a day  - Ambulate patient 3 times a day  - Out of bed to chair 3 times a day   - Out of bed for meals 3 times a day  - Out of bed for toileting  - Record patient progress and toleration of activity level   Outcome: Progressing     Problem: DISCHARGE PLANNING  Goal: Discharge to home or other facility with appropriate resources  Description: INTERVENTIONS:  - Identify barriers to discharge w/patient and caregiver  - Arrange for needed discharge resources and transportation as appropriate  - Identify discharge learning needs (meds, wound care, etc )  - Arrange for interpretive services to assist at discharge as needed  - Refer to Case Management Department for coordinating discharge planning if the patient needs post-hospital services based on physician/advanced practitioner order or complex needs related to functional status, cognitive ability, or social support system  Outcome: Progressing     Problem: Knowledge Deficit  Goal: Patient/family/caregiver demonstrates understanding of disease process, treatment plan, medications, and discharge instructions  Description: Complete learning assessment and assess knowledge base    Interventions:  - Provide teaching at level of understanding  - Provide teaching via preferred learning methods  Outcome: Progressing     Problem: CARDIOVASCULAR - ADULT  Goal: Maintains optimal cardiac output and hemodynamic stability  Description: INTERVENTIONS:  - Monitor I/O, vital signs and rhythm  - Monitor for S/S and trends of decreased cardiac output  - Administer and titrate ordered vasoactive medications to optimize hemodynamic stability  - Assess quality of pulses, skin color and temperature  - Assess for signs of decreased coronary artery perfusion  - Instruct patient to report change in severity of symptoms  Outcome: Progressing  Goal: Absence of cardiac dysrhythmias or at baseline rhythm  Description: INTERVENTIONS:  - Continuous cardiac monitoring, vital signs, obtain 12 lead EKG if ordered  - Administer antiarrhythmic and heart rate control medications as ordered  - Monitor electrolytes and administer replacement therapy as ordered  Outcome: Progressing

## 2022-06-06 NOTE — PLAN OF CARE
Problem: Potential for Falls  Goal: Patient will remain free of falls  Description: INTERVENTIONS:  - Educate patient/family on patient safety including physical limitations  - Instruct patient to call for assistance with activity   - Consult OT/PT to assist with strengthening/mobility   - Keep Call bell within reach  - Keep bed low and locked with side rails adjusted as appropriate  - Keep care items and personal belongings within reach  - Initiate and maintain comfort rounds  - Make Fall Risk Sign visible to staff  - Apply yellow socks and bracelet for high fall risk patients  - Consider moving patient to room near nurses station  Outcome: Progressing     Problem: PAIN - ADULT  Goal: Verbalizes/displays adequate comfort level or baseline comfort level  Description: Interventions:  - Encourage patient to monitor pain and request assistance  - Assess pain using appropriate pain scale  - Administer analgesics based on type and severity of pain and evaluate response  - Implement non-pharmacological measures as appropriate and evaluate response  - Consider cultural and social influences on pain and pain management  - Notify physician/advanced practitioner if interventions unsuccessful or patient reports new pain  Outcome: Progressing     Problem: INFECTION - ADULT  Goal: Absence or prevention of progression during hospitalization  Description: INTERVENTIONS:  - Assess and monitor for signs and symptoms of infection  - Monitor lab/diagnostic results  - Monitor all insertion sites, i e  indwelling lines, tubes, and drains  - Monitor endotracheal if appropriate and nasal secretions for changes in amount and color  - [place] appropriate cooling/warming therapies per order  - Administer medications as ordered  - Instruct and encourage patient and family to use good hand hygiene technique  - Identify and instruct in appropriate isolation precautions for identified infection/condition  Outcome: Progressing     Problem: SAFETY ADULT  Goal: Patient will remain free of falls  Description: INTERVENTIONS:  - Educate patient/family on patient safety including physical limitations  - Instruct patient to call for assistance with activity   - Consult OT/PT to assist with strengthening/mobility   - Keep Call bell within reach  - Keep bed low and locked with side rails adjusted as appropriate  - Keep care items and personal belongings within reach  - Initiate and maintain comfort rounds  - Make Fall Risk Sign visible to staff  - Apply yellow socks and bracelet for high fall risk patients  - Consider moving patient to room near nurses station  Outcome: Progressing  Goal: Maintain or return to baseline ADL function  Description: INTERVENTIONS:  - Educate patient/family on patient safety including physical limitations  - Instruct patient to call for assistance with activity   - Consult OT/PT to assist with strengthening/mobility   - Keep Call bell within reach  - Keep bed low and locked with side rails adjusted as appropriate  - Keep care items and personal belongings within reach  - Initiate and maintain comfort rounds  - Make Fall Risk Sign visible to staff  - Apply yellow socks and bracelet for high fall risk patients  - Consider moving patient to room near nurses station  Outcome: Progressing  Goal: Maintains/Returns to pre admission functional level  Description: INTERVENTIONS:  - Perform BMAT or MOVE assessment daily    - Set and communicate daily mobility goal to care team and patient/family/caregiver     - Collaborate with rehabilitation services on mobility goals if consulted  - Out of bed for toileting  - Record patient progress and toleration of activity level   Outcome: Progressing     Problem: DISCHARGE PLANNING  Goal: Discharge to home or other facility with appropriate resources  Description: INTERVENTIONS:  - Identify barriers to discharge w/patient and caregiver  - Arrange for needed discharge resources and transportation as appropriate  - Identify discharge learning needs (meds, wound care, etc )  - Arrange for interpretive services to assist at discharge as needed  - Refer to Case Management Department for coordinating discharge planning if the patient needs post-hospital services based on physician/advanced practitioner order or complex needs related to functional status, cognitive ability, or social support system  Outcome: Progressing     Problem: Knowledge Deficit  Goal: Patient/family/caregiver demonstrates understanding of disease process, treatment plan, medications, and discharge instructions  Description: Complete learning assessment and assess knowledge base    Interventions:  - Provide teaching at level of understanding  - Provide teaching via preferred learning methods  Outcome: Progressing     Problem: CARDIOVASCULAR - ADULT  Goal: Maintains optimal cardiac output and hemodynamic stability  Description: INTERVENTIONS:  - Monitor I/O, vital signs and rhythm  - Monitor for S/S and trends of decreased cardiac output  - Administer and titrate ordered vasoactive medications to optimize hemodynamic stability  - Assess quality of pulses, skin color and temperature  - Assess for signs of decreased coronary artery perfusion  - Instruct patient to report change in severity of symptoms  Outcome: Progressing  Goal: Absence of cardiac dysrhythmias or at baseline rhythm  Description: INTERVENTIONS:  - Continuous cardiac monitoring, vital signs, obtain 12 lead EKG if ordered  - Administer antiarrhythmic and heart rate control medications as ordered  - Monitor electrolytes and administer replacement therapy as ordered  Outcome: Progressing

## 2022-06-06 NOTE — PROGRESS NOTES
Cardiology Progress Note - Roni Bansal 64 y o  female MRN: [id number]    Unit/Bed#: E4 -01 Encounter: [id number]          Subjective:   Patient seen and examined  No significant telemetry events overnight but for some intermittent PVCs with runs of trigeminy/quadrigeminy  Her respiratory status has significantly improved  He is able to ambulate around the room without significant limitation  She endorses a family history of congestive heart failure in her mother which was diagnosed at age 36 and treated medically  She is noted to have dactylitis but denies discomfort or color changes locally  Hospital Course:   Roni Bansal is a 64y o  year old female with previous tobacco use disorder until 2 weeks ago +/- COPD, alcohol use disorder, postoperative DVT 2021 but otherwise unknown medical history due to lack of medical follow-up throughout the years  She presented with weeks of worsening exertional dyspnea and lower extremity edema  She ruled in for acute decompensated heart failure by clinical evaluation  Transthoracic echocardiogram revealed severely reduced LV function with dilated chamber and evidence of increased intracardiac pressure  She was initiated on IV diuretics and guideline directed medical therapy partially  A left heart catheterization was ordered and revealed normal epicardial vessels  Vitals: Blood pressure 120/83, pulse 78, temperature 98 2 °F (36 8 °C), temperature source Temporal, resp  rate 18, height 5' 2" (1 575 m), weight 62 3 kg (137 lb 6 4 oz), SpO2 100 %  , Body mass index is 25 13 kg/m² ,   Orthostatic Blood Pressures    Flowsheet Row Most Recent Value   Blood Pressure 120/83 filed at 06/06/2022 1030   Patient Position - Orthostatic VS Lying filed at 06/06/2022 0945          No intake or output data in the 24 hours ending 06/06/22 1051    Review of System:  Review of system was conducted and was negative except for as stated in the subjective course  Physical Exam:    GEN: Marin Seen appears well, alert and oriented x 3, pleasant and cooperative   HEENT:  Normocephalic, atraumatic, anicteric, moist mucous membranes  NECK: + JVD to the lower 3rd of the neck  HEART:  Regular rhythm, normal rate, normal S1 and S2, no murmurs, laterally displaced PMI  LUNGS:  Fine bibasilar crackles up to the mid lung; respiration nonlabored   ABDOMEN:  Normoactive bowel sounds, soft, no tenderness, no distention  EXTREMITIES: peripheral pulses palpable; no dependent or lower extremity edema  NEURO: no gross focal findings; cranial nerves grossly intact   SKIN:  Bilateral dactylitis, dry, warm to touch    ACCESS:  Right Radial artery has  + 2 pulse with brisk capillary refill  Neurovascular intact to  Right hand  TR band in place        Current Facility-Administered Medications:     aluminum-magnesium hydroxide-simethicone (MYLANTA) oral suspension 30 mL, 30 mL, Oral, Q6H PRN, CARMEN Walker    amoxicillin-clavulanate (AUGMENTIN) 875-125 mg per tablet 1 tablet, 1 tablet, Oral, Q12H [address], Cherelle Tian MD, 1 tablet at 06/06/22 0816    enoxaparin (LOVENOX) subcutaneous injection 40 mg, 40 mg, Subcutaneous, Daily, CARMEN Walker    folic acid (FOLVITE) tablet 1 mg, 1 mg, Oral, Daily, CARMEN Walker, 1 mg at 06/06/22 0817    furosemide (LASIX) injection 40 mg, 40 mg, Intravenous, Once, Ivania Cee MD    hydrOXYzine HCL (ATARAX) tablet 25 mg, 25 mg, Oral, Q6H PRN, Andreia Monge PA-C, 25 mg at 06/06/22 1497    ipratropium (ATROVENT) 0 02 % inhalation solution 0 5 mg, 0 5 mg, Nebulization, TID, Shola Loud, CRNP, 0 5 mg at 06/06/22 0750    levalbuterol (XOPENEX) inhalation solution 1 25 mg, 1 25 mg, Nebulization, TID, Shola Loud, CRNP, 1 25 mg at 06/06/22 0750    [START ON 6/7/2022] losartan (COZAAR) tablet 25 mg, 25 mg, Oral, Daily, Ivania Cee MD    melatonin tablet 6 mg, 6 mg, Oral, , [facility] JULI Monge, 6 mg at 06/06/22 0030    metoprolol succinate (TOPROL-XL) 24 hr tablet 50 mg, 50 mg, Oral, Daily, Keren Lombardi MD    multivitamin-minerals (CENTRUM) tablet 1 tablet, 1 tablet, Oral, Daily, CARMEN Vila, 1 tablet at 06/06/22 0817    nicotine (NICODERM CQ) 21 mg/24 hr TD 24 hr patch 21 mg, 21 mg, Transdermal, Daily, CARMEN Vila, 21 mg at 06/06/22 0814    ondansetron (ZOFRAN) injection 4 mg, 4 mg, Intravenous, Q6H PRN, CARMEN Vila    polyethylene glycol (MIRALAX) packet 17 g, 17 g, Oral, Daily, Izabel Saxena MD, 17 g at 06/05/22 1611    senna (SENOKOT) tablet 17 2 mg, 2 tablet, Oral, HS, Izabel Saxena MD, 17 2 mg at 06/05/22 2132    simethicone (MYLICON) chewable tablet 80 mg, 80 mg, Oral, 4x Daily PRN, CARMEN Vila    sodium chloride 0 9 % infusion, 50 mL/hr, Intravenous, Continuous, Keren Lombardi MD, Last Rate: 50 mL/hr at 06/06/22 0955, 50 mL/hr at 06/06/22 0955    [START ON 6/7/2022] spironolactone (ALDACTONE) tablet 12 5 mg, 12 5 mg, Oral, Daily, Keren Lombardi MD    thiamine tablet 100 mg, 100 mg, Oral, Daily, CARMEN Vila, 100 mg at 06/06/22 0816    Labs & Results:  Results from last 7 days   Lab Units 06/05/22  1334 06/03/22  0026 06/02/22  2234   HS TNI 0HR ng/L  --   --  44   HS TNI 2HR ng/L  --  43  --    HS TNI 4HR ng/L 23  --   --      Results from last 7 days   Lab Units 06/02/22  2234   NT-PRO BNP pg/mL 9,840*     Results from last 7 days   Lab Units 06/06/22  0610 06/05/22  1334 06/02/22  2234   POTASSIUM mmol/L 3 8 5 0 4 8   CO2 mmol/L 30 31 26   CHLORIDE mmol/L 100 98* 101   BUN mg/dL 35* 35* 17   CREATININE mg/dL 1 24 1 36* 0 87     Results from last 7 days   Lab Units 06/06/22  0610 06/05/22  1334 06/02/22  2234   HEMOGLOBIN g/dL 11 2* 11 8 11 7   HEMATOCRIT % 36 6 38 6 38 1   PLATELETS Thousands/uL 583* 594* 486*     Results from last 7 days   Lab Units 06/02/22  2234   TRIGLYCERIDES mg/dL 78   HDL mg/dL 55   LDL CALC mg/dL 85       Telemetry:   Personally reviewed by Km Pollock MD: Sinus rhythm with heart rate 70-90 and rare PVCs    Imagin/3 CTA PE study      6/3 TTE        VTE Prophylaxis: Enoxaparin (Lovenox) PPX       Assessment:  Principal Problem:    Non-ischemic cardiomyopathy ([facility] )  Active Problems:    WHO group 2 pulmonary arterial hypertension (HCC)    History of leukocytosis    Mediastinal lymphadenopathy    Smokes cigarettes    Alcohol ingestion, 1-4 drinks per day    SIRS (systemic inflammatory response syndrome) (HCC)    Tooth infection    COPD (chronic obstructive pulmonary disease) ([facility] )        1  Non-ischemic dilated cardiomyopathy w/ acute decompensation  -high sensitivity troponin 44 -> 23, NT-proBNP 9840  - lipids:  T 156, TG 78, HDL 55, calculated LDL 85  -CRP 5 2, ESR 19, lactic 1 2, procalcitonin 0 1, TSH 5 6  -6/3 TTE:  LVEF 25%, LVIDd 6 cm, G3 DD, + LVEDP/LAP, mild RV enlargement with mild reduction, 1+ secondary MR  - [facility], RRA): normal epicardial vessels  -losartan 25 mg daily, metoprolol tartrate 25 mg b i d , spironolactone 12 5 mg daily, s/p 160 mg of furosemide total    2  WHO group 2 PH  -in setting of dilated cardiomyopathy, +/- COPD  -6/3 TTE:  2+ TR with PASP 56 mmHg, no RVOT notching, mild RV enlargement with mildly reduced function  -6/3 CTA chest:  No PE, MPA 3 2 cm        Plan:  1  Patient with nonischemic dilated cardiomyopathy  It is possibly toxin mediated given alcohol use disorder on the daily basis  However, she has bilateral dactylitis as well as lymphadenopathy on CTA chest   Therefore, other etiologies cannot be definitely ruled out at this time  Dilated cardiomyopathy panel sent (COVID/RSV/flu swab, ferritin, B12/folate, CMV, EBV, PVB19, adenovirus, mononucleosis, urine toxicology, hepatitis panel, HIV, MARIXA//rheumatoid factor)      2  She still has evidence of mild volume overload on exam as demonstrated by JVD and bibasilar crackles  Will administer an additional dose of furosemide 5 mg IV x1 and reassess tomorrow  3  She has normal epicardial vessels without evidence of atherosclerotic disease  Aspirin was discontinued  Her LDL is at goal   She has stopped smoking as of 2 weeks ago for which she was commended and abstinence was encouraged going forward  4  Transition to metoprolol succinate 50 mg daily and titrate up as tolerated  If renal function remains stable or improves tomorrow, can increase spironolactone to 25 mg  Case discussed and reviewed with Dr Lety Brown who agrees with my assessment and plan  Thank you for involving us in the care of your patient  Km Pollock MD  Cardiology Fellow PGY-6      Epic/ Allscripts/Care Everywhere records reviewed: yes    ** Please Note: Fluency DirectDictation voice to text software may have been used in the creation of this document   **

## 2022-06-06 NOTE — PROGRESS NOTES
[facility]  Progress Note - Yandel Parker 1960, 64 y o  female MRN: [id number]  Unit/Bed#: E4 -01 Encounter: [id number]  Primary Care Provider: Cyndie Clark DO   Date and time admitted to hospital: 6/2/2022  9:49 PM    * Non-ischemic cardiomyopathy ([address] )  Assessment & Plan  · Reports dyspnea, JACKSON, orthopnea and edema  · Elevated proBNP 9000 on admission  · Imaging consistent with heart failure, pleural effusions   · Diuresed with IV Lasix, cardiology following  · 2D echo showing  EF 83%, grade 3 diastolic dysfunction  · Systolic and diastolic heart failure  · Continue aspirin, beta-blockers, add aldactone, will need ACE/ARB  · Cardiac catheterization with no evidence of obstructive pathology  · Continue fluid restriction and low-sodium diet at discharge    COPD (chronic obstructive pulmonary disease) (HCC)  Assessment & Plan  Suspected likely COPD exacerbation  Wean IV steroids, transition to PO prednisone for 3 days  Continue bronchodilators  Will need follow up with pulmonology outpatient for PFTs    Tooth infection  Assessment & Plan  Recently diagnosed outpatient with tooth infection, only took 2 days of abx and stopped due to GI upset  Continue augmentin for 7 days, currently tolerating  Recommend outpatient follow up with dentist    Alcohol ingestion, 1-4 drinks per day  Assessment & Plan  · Patient reports drinking several drinks of alcohol daily  · CIWA protocol, patient denies hx of withdrawals    Smokes cigarettes  Assessment & Plan  · Smokes >1/2 PPD; reports readiness to quit, last use 2 weeks prior   · Tobacco cessation education, nicotine patch    Mediastinal lymphadenopathy  Assessment & Plan  · CTA chest shows mediastinal lymphadenopathy  · Per pulmonology, recommend repeat CT imaging in 3 months  · Will need follow up outpatient with PCP or Pulmonology for lung screenings    History of leukocytosis  Assessment & Plan  · Chronic leukocytosis, outpatient f/u with Hematology      [facility] SPECIALTY [facility] Internal Medicine Progress Note  Patient: Rnoi Bansal 64 y o  female   MRN: [id number]  PCP: Dipti Coon DO  Unit/Bed#: E4 -01 Encounter: [id number]  Date Of Visit: 22    Assessment:    Principal Problem:    Non-ischemic cardiomyopathy ([facility] )  Active Problems:    History of leukocytosis    Mediastinal lymphadenopathy    Smokes cigarettes    Alcohol ingestion, 1-4 drinks per day    Tooth infection    COPD (chronic obstructive pulmonary disease) ([facility] )      Plan:    · Continue IV diuresis per cardiology recommendation  · Discharge planning       VTE Pharmacologic Prophylaxis:   Pharmacologic: Enoxaparin (Lovenox)  Mechanical VTE Prophylaxis in Place: Yes    Patient Centered Rounds: I have performed bedside rounds with nursing staff today  Discussions with Specialists or Other Care Team Provider:  Case management/Cardiology    Education and Discussions with Family / Patient:  Patient updating family    Time Spent for Care: 45 minutes  More than 50% of total time spent on counseling and coordination of care as described above  Current Length of Stay: 3 day(s)    Current Patient Status: Inpatient   Certification Statement: The patient will continue to require additional inpatient hospital stay due to Discharge planning    Discharge Plan / Estimated Discharge Date:  Hopefully tomorrow    Code Status: Level 1 - Full Code      Subjective:   Seen examined, breathing is improving  Denies any chest pain, ambulating without any significant shortness of breath    A complete and comprehensive 14 point organ system review has been performed and all other systems are negative other than stated above      Objective:     Vitals:   Temp (24hrs), Av 9 °F (36 6 °C), Min:97 7 °F (36 5 °C), Max:98 2 °F (36 8 °C)    Temp:  [97 7 °F (36 5 °C)-98 2 °F (36 8 °C)] 98 2 °F (36 8 °C)  HR:  [] 89  Resp:  [18-20] 18  BP: (104-142)/(69-96) 104/69  SpO2:  [95 %-100 %] 95 %  Body mass index is 25 13 kg/m²  Input and Output Summary (last 24 hours): Intake/Output Summary (Last 24 hours) at 6/6/2022 1656  Last data filed at 6/6/2022 1518  Gross per 24 hour   Intake 450 ml   Output 800 ml   Net -350 ml       Physical Exam:     General: well appearing, no acute distress  HEENT: atraumatic, PERRLA, moist mucosa, normal pharynx, normal tonsils and adenoids, normal tongue, no fluid in sinuses  Neck: Trachea midline, no carotid bruit, no masses  Respiratory: normal chest wall expansion, CTA B, no r/r/w, no rubs  Cardiovascular: RRR, no m/r/g, Normal S1 and S2  Abdomen: Soft, non-tender, non-distended, normal bowel sounds in all quadrants, no hepatosplenomegaly, no tympany  Rectal: deferred  Musculoskeletal: normal ROM in upper and lower extremities  Integumentary: warm, dry, and pink, with no rash, purpura, or petechia  Heme/Lymph: no lymphadenopathy, no bruises  Neurological: Cranial Nerves II-XII grossly intact, no tics, normal sensation to pressure and light touch  Psychiatric: cooperative with normal mood, affect, and cognition      Additional Data:     Labs:    Results from last 7 days   Lab Units 06/06/22  0610   WBC Thousand/uL 15 32*   HEMOGLOBIN g/dL 11 2*   HEMATOCRIT % 36 6   PLATELETS Thousands/uL 583*   NEUTROS PCT % 64   LYMPHS PCT % 24   MONOS PCT % 9   EOS PCT % 1     Results from last 7 days   Lab Units 06/06/22  0610   POTASSIUM mmol/L 3 8   CHLORIDE mmol/L 100   CO2 mmol/L 30   BUN mg/dL 35*   CREATININE mg/dL 1 24   CALCIUM mg/dL 8 5           * I Have Reviewed All Lab Data Listed Above  * Additional Pertinent Lab Tests Reviewed: [address] Admission Reviewed    Imaging:    Imaging Reports Reviewed Today Include:  None  Imaging Personally Reviewed by Myself Includes:  None    Recent Cultures (last 7 days):     Results from last 7 days   Lab Units 06/02/22  2305   BLOOD CULTURE  No Growth at 72 hrs  No Growth at 72 hrs         Last 24 Hours Medication List:   Current Facility-Administered Medications   Medication Dose Route Frequency Provider Last Rate    aluminum-magnesium hydroxide-simethicone  30 mL Oral Q6H PRN CARMEN Carrera      amoxicillin-clavulanate  1 tablet Oral Q12H Tim Burk MD      enoxaparin  40 mg Subcutaneous Daily CARMEN Carrera      folic acid  1 mg Oral Daily CARMEN Carrera      hydrOXYzine HCL  25 mg Oral Q6H PRN Wendy DavidJULI      ipratropium  0 5 mg Nebulization TID CARMEN Mars      levalbuterol  1 25 mg Nebulization TID CARMEN Mars      [START ON 6/7/2022] losartan  25 mg Oral Daily Varun Graham MD      melatonin  6 mg Oral HS Wendy Hernandez PA-C      metoprolol succinate  50 mg Oral Daily Varun Graham MD      multivitamin-minerals  1 tablet Oral Daily Collette Carrera [address]      nicotine  21 mg Transdermal Daily CARMEN Carrera      ondansetron  4 mg Intravenous Q6H PRN CARMEN Carrera      polyethylene glycol  17 g Oral Daily Jey Esposito MD      senna  2 tablet Oral HS Jey Esposito MD      simethicone  80 mg Oral 4x Daily PRN CARMEN Carrera      [START ON 6/7/2022] spironolactone  12 5 mg Oral Daily Varun Graham MD      thiamine  100 mg Oral Daily CARMEN Carrera          Today, Patient Was Seen By: Sasha Harp DO    ** Please Note: This note was completed in part utilizing M-Modal Fluency Direct Software  Grammatical errors, random word insertions, spelling mistakes, and incomplete sentences may be an occasional consequence of this system secondary to software limitations, ambient noise, and hardware issues  If you have any questions or concerns about the content, text, or information contained within the body of this dictation, please contact the provider for clarification   **

## 2022-06-07 VITALS
HEART RATE: 89 BPM | BODY MASS INDEX: 25.64 KG/M2 | WEIGHT: 139.3 LBS | TEMPERATURE: 97 F | OXYGEN SATURATION: 94 % | SYSTOLIC BLOOD PRESSURE: 143 MMHG | HEIGHT: 62 IN | RESPIRATION RATE: 16 BRPM | DIASTOLIC BLOOD PRESSURE: 80 MMHG

## 2022-06-07 PROCEDURE — 94760 N-INVAS EAR/PLS OXIMETRY 1: CPT

## 2022-06-07 PROCEDURE — 99239 HOSP IP/OBS DSCHRG MGMT >30: CPT | Performed by: INTERNAL MEDICINE

## 2022-06-07 PROCEDURE — 94640 AIRWAY INHALATION TREATMENT: CPT

## 2022-06-07 RX ORDER — LOSARTAN POTASSIUM 25 MG/1
25 TABLET ORAL DAILY
Qty: 30 TABLET | Refills: 0 | Status: SHIPPED | OUTPATIENT
Start: 2022-06-08 | End: 2022-07-05 | Stop reason: SDUPTHER

## 2022-06-07 RX ORDER — AMOXICILLIN AND CLAVULANATE POTASSIUM 875; 125 MG/1; MG/1
1 TABLET, FILM COATED ORAL EVERY 12 HOURS SCHEDULED
Qty: 7 TABLET | Refills: 0 | Status: SHIPPED | OUTPATIENT
Start: 2022-06-07 | End: 2022-06-11

## 2022-06-07 RX ORDER — NICOTINE 21 MG/24HR
1 PATCH, TRANSDERMAL 24 HOURS TRANSDERMAL DAILY
Qty: 28 PATCH | Refills: 0 | Status: SHIPPED | OUTPATIENT
Start: 2022-06-08 | End: 2022-07-05 | Stop reason: SDUPTHER

## 2022-06-07 RX ORDER — METOPROLOL SUCCINATE 50 MG/1
50 TABLET, EXTENDED RELEASE ORAL DAILY
Qty: 30 TABLET | Refills: 0 | Status: SHIPPED | OUTPATIENT
Start: 2022-06-08 | End: 2022-07-05 | Stop reason: SDUPTHER

## 2022-06-07 RX ORDER — SPIRONOLACTONE 25 MG/1
12.5 TABLET ORAL DAILY
Qty: 15 TABLET | Refills: 0 | Status: SHIPPED | OUTPATIENT
Start: 2022-06-08 | End: 2022-07-05 | Stop reason: SDUPTHER

## 2022-06-07 RX ADMIN — FOLIC ACID 1 MG: 1 TABLET ORAL at 08:54

## 2022-06-07 RX ADMIN — AMOXICILLIN AND CLAVULANATE POTASSIUM 1 TABLET: 875; 125 TABLET, FILM COATED ORAL at 08:54

## 2022-06-07 RX ADMIN — THIAMINE HCL TAB 100 MG 100 MG: 100 TAB at 08:54

## 2022-06-07 RX ADMIN — Medication 21 MG: at 08:55

## 2022-06-07 RX ADMIN — POLYETHYLENE GLYCOL 3350 17 G: 17 POWDER, FOR SOLUTION ORAL at 08:54

## 2022-06-07 RX ADMIN — Medication 1 TABLET: at 08:54

## 2022-06-07 RX ADMIN — IPRATROPIUM BROMIDE 0.5 MG: 0.5 SOLUTION RESPIRATORY (INHALATION) at 07:32

## 2022-06-07 RX ADMIN — SPIRONOLACTONE 12.5 MG: 25 TABLET, FILM COATED ORAL at 08:54

## 2022-06-07 RX ADMIN — LEVALBUTEROL 1.25 MG: 1.25 SOLUTION, CONCENTRATE RESPIRATORY (INHALATION) at 07:32

## 2022-06-07 RX ADMIN — LOSARTAN POTASSIUM 25 MG: 25 TABLET, FILM COATED ORAL at 08:55

## 2022-06-07 RX ADMIN — METOPROLOL SUCCINATE 50 MG: 50 TABLET, EXTENDED RELEASE ORAL at 08:54

## 2022-06-07 NOTE — PLAN OF CARE
Problem: Potential for Falls  Goal: Patient will remain free of falls  Description: INTERVENTIONS:  - Educate patient/family on patient safety including physical limitations  - Instruct patient to call for assistance with activity   - Consult OT/PT to assist with strengthening/mobility   - Keep Call bell within reach  - Keep bed low and locked with side rails adjusted as appropriate  - Keep care items and personal belongings within reach  - Initiate and maintain comfort rounds  - Make Fall Risk Sign visible to staff  - Apply yellow socks and bracelet for high fall risk patients  - Consider moving patient to room near nurses station  6/7/2022 0804 by Sindy Owusu RN  Outcome: Progressing  6/6/2022 1826 by Sindy Owusu RN  Outcome: Progressing     Problem: PAIN - ADULT  Goal: Verbalizes/displays adequate comfort level or baseline comfort level  Description: Interventions:  - Encourage patient to monitor pain and request assistance  - Assess pain using appropriate pain scale  - Administer analgesics based on type and severity of pain and evaluate response  - Implement non-pharmacological measures as appropriate and evaluate response  - Consider cultural and social influences on pain and pain management  - Notify physician/advanced practitioner if interventions unsuccessful or patient reports new pain  6/7/2022 0804 by Sindy Owusu RN  Outcome: Progressing  6/6/2022 1826 by Sindy Owusu RN  Outcome: Progressing     Problem: INFECTION - ADULT  Goal: Absence or prevention of progression during hospitalization  Description: INTERVENTIONS:  - Assess and monitor for signs and symptoms of infection  - Monitor lab/diagnostic results  - Monitor all insertion sites, i e  indwelling lines, tubes, and drains  - Monitor endotracheal if appropriate and nasal secretions for changes in amount and color  - [place] appropriate cooling/warming therapies per order  - Administer medications as ordered  - Instruct and encourage patient and family to use good hand hygiene technique  - Identify and instruct in appropriate isolation precautions for identified infection/condition  6/7/2022 0804 by Donell Roa RN  Outcome: Progressing  6/6/2022 1826 by Donell Roa RN  Outcome: Progressing     Problem: SAFETY ADULT  Goal: Patient will remain free of falls  Description: INTERVENTIONS:  - Educate patient/family on patient safety including physical limitations  - Instruct patient to call for assistance with activity   - Consult OT/PT to assist with strengthening/mobility   - Keep Call bell within reach  - Keep bed low and locked with side rails adjusted as appropriate  - Keep care items and personal belongings within reach  - Initiate and maintain comfort rounds  - Make Fall Risk Sign visible to staff  - Apply yellow socks and bracelet for high fall risk patients  - Consider moving patient to room near nurses station  6/7/2022 0804 by Donell Roa RN  Outcome: Progressing  6/6/2022 1826 by Donell Roa RN  Outcome: Progressing  Goal: Maintain or return to baseline ADL function  Description: INTERVENTIONS:  - Educate patient/family on patient safety including physical limitations  - Instruct patient to call for assistance with activity   - Consult OT/PT to assist with strengthening/mobility   - Keep Call bell within reach  - Keep bed low and locked with side rails adjusted as appropriate  - Keep care items and personal belongings within reach  - Initiate and maintain comfort rounds  - Make Fall Risk Sign visible to staff  - Apply yellow socks and bracelet for high fall risk patients  - Consider moving patient to room near nurses station  6/7/2022 0804 by Donell Roa RN  Outcome: Progressing  6/6/2022 1826 by Donell Roa RN  Outcome: Progressing  Goal: Maintains/Returns to pre admission functional level  Description: INTERVENTIONS:  - Perform BMAT or MOVE assessment daily    - Set and communicate daily mobility goal to care team and patient/family/caregiver  - Collaborate with rehabilitation services on mobility goals if consulted  - Out of bed for toileting  - Record patient progress and toleration of activity level   6/7/2022 0804 by Amari Parr RN  Outcome: Progressing  6/6/2022 1826 by Amari Parr RN  Outcome: Progressing     Problem: DISCHARGE PLANNING  Goal: Discharge to home or other facility with appropriate resources  Description: INTERVENTIONS:  - Identify barriers to discharge w/patient and caregiver  - Arrange for needed discharge resources and transportation as appropriate  - Identify discharge learning needs (meds, wound care, etc )  - Arrange for interpretive services to assist at discharge as needed  - Refer to Case Management Department for coordinating discharge planning if the patient needs post-hospital services based on physician/advanced practitioner order or complex needs related to functional status, cognitive ability, or social support system  6/7/2022 0804 by Amari Parr RN  Outcome: Progressing  6/6/2022 1826 by Amari Parr RN  Outcome: Progressing     Problem: Knowledge Deficit  Goal: Patient/family/caregiver demonstrates understanding of disease process, treatment plan, medications, and discharge instructions  Description: Complete learning assessment and assess knowledge base    Interventions:  - Provide teaching at level of understanding  - Provide teaching via preferred learning methods  6/7/2022 0804 by Amari Parr RN  Outcome: Progressing  6/6/2022 1826 by Amari Parr RN  Outcome: Progressing     Problem: CARDIOVASCULAR - ADULT  Goal: Maintains optimal cardiac output and hemodynamic stability  Description: INTERVENTIONS:  - Monitor I/O, vital signs and rhythm  - Monitor for S/S and trends of decreased cardiac output  - Administer and titrate ordered vasoactive medications to optimize hemodynamic stability  - Assess quality of pulses, skin color and temperature  - Assess for signs of decreased coronary artery perfusion  - Instruct patient to report change in severity of symptoms  6/7/2022 0804 by Cass Gaytan RN  Outcome: Progressing  6/6/2022 1826 by Cass Gaytan RN  Outcome: Progressing  Goal: Absence of cardiac dysrhythmias or at baseline rhythm  Description: INTERVENTIONS:  - Continuous cardiac monitoring, vital signs, obtain 12 lead EKG if ordered  - Administer antiarrhythmic and heart rate control medications as ordered  - Monitor electrolytes and administer replacement therapy as ordered  6/7/2022 0804 by Cass Gaytan RN  Outcome: Progressing  6/6/2022 1826 by Cass Gaytan RN  Outcome: Progressing

## 2022-06-07 NOTE — CASE MANAGEMENT
Case Management Discharge Planning Note    Patient name José Miguel Ang  Location [facility] /[facility]4 -* MRN [id number]  : 1960 Date 2022       Current Admission Date: 2022  Current Admission Diagnosis:Non-ischemic cardiomyopathy [facility])   Patient Active Problem List    Diagnosis Date Noted    Tooth infection 2022    COPD (chronic obstructive pulmonary disease) ([facility] [address] ) 2022    History of leukocytosis 2022    Non-ischemic cardiomyopathy ([facility] ) 2022    Mediastinal lymphadenopathy 2022    Smokes cigarettes 2022    Alcohol ingestion, 1-4 drinks per day 2022    Open wound of right hip 2020    Chronic hip pain after total replacement of right hip joint 2020      LOS (days): 4  Geometric Mean LOS (GMLOS) (days): 2 90  Days to GMLOS:-1 5     OBJECTIVE:  Risk of Unplanned Readmission Score: 11 51         Current admission status: Inpatient   Preferred Pharmacy:   [address] [facility], [address]  Phone: [phone] Fax: [phone]    Primary Care Provider: Cali Oviedo DO    Primary Insurance: [employer]IE [employer] PENDING  Secondary Insurance:     DISCHARGE DETAILS:    Discharge planning discussed with[de-identified] pt  Freedom of Choice: Yes  Comments - Freedom of Choice: MD is planning on discharging pt today  Pt has cath which was clean  Pt is SUZIE COELHO pending  Met with pt and discuss about getting all her supporting documentation in the PATHS to get MA  Gave her the GoodRx card and discuss [facility]  [employer]  Pt stated she has a ride and no other issues or concerns for discharge

## 2022-06-07 NOTE — PLAN OF CARE
Problem: Potential for Falls  Goal: Patient will remain free of falls  Description: INTERVENTIONS:  - Educate patient/family on patient safety including physical limitations  - Instruct patient to call for assistance with activity   - Consult OT/PT to assist with strengthening/mobility   - Keep Call bell within reach  - Keep bed low and locked with side rails adjusted as appropriate  - Keep care items and personal belongings within reach  - Initiate and maintain comfort rounds  - Make Fall Risk Sign visible to staff  - Apply yellow socks and bracelet for high fall risk patients  - Consider moving patient to room near nurses station  Outcome: Progressing     Problem: PAIN - ADULT  Goal: Verbalizes/displays adequate comfort level or baseline comfort level  Description: Interventions:  - Encourage patient to monitor pain and request assistance  - Assess pain using appropriate pain scale  - Administer analgesics based on type and severity of pain and evaluate response  - Implement non-pharmacological measures as appropriate and evaluate response  - Consider cultural and social influences on pain and pain management  - Notify physician/advanced practitioner if interventions unsuccessful or patient reports new pain  Outcome: Progressing     Problem: INFECTION - ADULT  Goal: Absence or prevention of progression during hospitalization  Description: INTERVENTIONS:  - Assess and monitor for signs and symptoms of infection  - Monitor lab/diagnostic results  - Monitor all insertion sites, i e  indwelling lines, tubes, and drains  - Monitor endotracheal if appropriate and nasal secretions for changes in amount and color  - [place] appropriate cooling/warming therapies per order  - Administer medications as ordered  - Instruct and encourage patient and family to use good hand hygiene technique  - Identify and instruct in appropriate isolation precautions for identified infection/condition  Outcome: Progressing     Problem: SAFETY ADULT  Goal: Patient will remain free of falls  Description: INTERVENTIONS:  - Educate patient/family on patient safety including physical limitations  - Instruct patient to call for assistance with activity   - Consult OT/PT to assist with strengthening/mobility   - Keep Call bell within reach  - Keep bed low and locked with side rails adjusted as appropriate  - Keep care items and personal belongings within reach  - Initiate and maintain comfort rounds  - Make Fall Risk Sign visible to staff  - Apply yellow socks and bracelet for high fall risk patients  - Consider moving patient to room near nurses station  Outcome: Progressing  Goal: Maintain or return to baseline ADL function  Description: INTERVENTIONS:  - Educate patient/family on patient safety including physical limitations  - Instruct patient to call for assistance with activity   - Consult OT/PT to assist with strengthening/mobility   - Keep Call bell within reach  - Keep bed low and locked with side rails adjusted as appropriate  - Keep care items and personal belongings within reach  - Initiate and maintain comfort rounds  - Make Fall Risk Sign visible to staff  - Apply yellow socks and bracelet for high fall risk patients  - Consider moving patient to room near nurses station  Outcome: Progressing  Goal: Maintains/Returns to pre admission functional level  Description: INTERVENTIONS:  - Educate patient/family on patient safety including physical limitations  - Instruct patient to call for assistance with activity   - Consult OT/PT to assist with strengthening/mobility   - Keep Call bell within reach  - Keep bed low and locked with side rails adjusted as appropriate  - Keep care items and personal belongings within reach  - Initiate and maintain comfort rounds  - Make Fall Risk Sign visible to staff  - Apply yellow socks and bracelet for high fall risk patients  - Consider moving patient to room near nurses station  Outcome: Progressing Problem: DISCHARGE PLANNING  Goal: Discharge to home or other facility with appropriate resources  Description: INTERVENTIONS:  - Identify barriers to discharge w/patient and caregiver  - Arrange for needed discharge resources and transportation as appropriate  - Identify discharge learning needs (meds, wound care, etc )  - Arrange for interpretive services to assist at discharge as needed  - Refer to Case Management Department for coordinating discharge planning if the patient needs post-hospital services based on physician/advanced practitioner order or complex needs related to functional status, cognitive ability, or social support system  Outcome: Progressing     Problem: Knowledge Deficit  Goal: Patient/family/caregiver demonstrates understanding of disease process, treatment plan, medications, and discharge instructions  Description: Complete learning assessment and assess knowledge base    Interventions:  - Provide teaching at level of understanding  - Provide teaching via preferred learning methods  Outcome: Progressing     Problem: CARDIOVASCULAR - ADULT  Goal: Maintains optimal cardiac output and hemodynamic stability  Description: INTERVENTIONS:  - Monitor I/O, vital signs and rhythm  - Monitor for S/S and trends of decreased cardiac output  - Administer and titrate ordered vasoactive medications to optimize hemodynamic stability  - Assess quality of pulses, skin color and temperature  - Assess for signs of decreased coronary artery perfusion  - Instruct patient to report change in severity of symptoms  Outcome: Progressing  Goal: Absence of cardiac dysrhythmias or at baseline rhythm  Description: INTERVENTIONS:  - Continuous cardiac monitoring, vital signs, obtain 12 lead EKG if ordered  - Administer antiarrhythmic and heart rate control medications as ordered  - Monitor electrolytes and administer replacement therapy as ordered  Outcome: Progressing

## 2022-06-07 NOTE — ASSESSMENT & PLAN NOTE
Suspected likely COPD exacerbation  Resolved  Continue bronchodilators  Will need follow up with pulmonology outpatient for PFTs

## 2022-06-07 NOTE — ASSESSMENT & PLAN NOTE
· Reports dyspnea, JACKSON, orthopnea and edema  · Elevated proBNP 9000 on admission  · Imaging consistent with heart failure, pleural effusions   · Diuresed with IV Lasix, now euvolemic  · 2D echo showing  EF 70%, grade 3 diastolic dysfunction  · Systolic and diastolic heart failure with exacerbation  · Continue aspirin, beta-blockers, add aldactone, losartan at discharge  · Cardiac catheterization with no evidence of obstructive pathology  · Continue fluid restriction and low-sodium diet at discharge

## 2022-06-07 NOTE — DISCHARGE SUMMARY
[address]  Discharge- Funmilayo Nicholas 1960, 64 y o  female MRN: [id number]  Unit/Bed#: E4 -01 Encounter: [id number]  Primary Care Provider: Stacie Agudelo DO   Date and time admitted to hospital: 6/2/2022  9:49 PM    Admitting Provider:  Alejo Sage MD  Discharge Provider:  Marie Brown DO  Admission Date: 6/2/2022       Discharge Date: 06/07/22   LOS: 4  Primary Care Physician at Discharge: Stacie Agudelo, [facility] COURSE:  Funmilayo Nicholas is a 64 y o  female who presented with shortness of breath difficulty breathing  She was found to have acute systolic and diastolic heart failure present on admission  She was found to have an ejection fraction of 28%, left heart catheterization showed cardiac vessels  She was subsequently diuresed appropriately  She did have evidence of a COPD exacerbation and did complete a 3 day prednisone burst   The patient was quickly weaned to room air, she remains on room air at 94%  She did have a tooth infection and will follow-up with a dentist at discharge  At the time of discharge patient was tolerating oral diet was ambulating hallways without assistance was medically cleared for discharge  The patient was offered nonischemic cardiomyopathy panel, she declined additional lab work    She should follow-up in the outpatient setting for nonischemic cardiomyopathy workup    DISCHARGE DIAGNOSES  * Non-ischemic cardiomyopathy ([facility] [address] )  Assessment & Plan  · Reports dyspnea, JACKSON, orthopnea and edema  · Elevated proBNP 9000 on admission  · Imaging consistent with heart failure, pleural effusions   · Diuresed with IV Lasix, now euvolemic  · 2D echo showing  EF 91%, grade 3 diastolic dysfunction  · Systolic and diastolic heart failure with exacerbation  · Continue aspirin, beta-blockers, add aldactone, losartan at discharge  · Cardiac catheterization with no evidence of obstructive pathology  · Continue fluid restriction and low-sodium diet at discharge    COPD (chronic obstructive pulmonary disease) (HCC)  Assessment & Plan  Suspected likely COPD exacerbation  Resolved  Continue bronchodilators  Will need follow up with pulmonology outpatient for PFTs    Tooth infection  Assessment & Plan  Recently diagnosed outpatient with tooth infection, only took 2 days of abx and stopped due to GI upset  Continue augmentin for 7 days, currently tolerating  Recommend outpatient follow up with dentist    Alcohol ingestion, 1-4 drinks per day  Assessment & Plan  · Patient reports drinking several drinks of alcohol daily  · CIWA protocol, patient denies hx of withdrawals    Smokes cigarettes  Assessment & Plan  · Smokes >1/2 PPD; reports readiness to quit, last use 2 weeks prior   · Tobacco cessation education, nicotine patch    Mediastinal lymphadenopathy  Assessment & Plan  · CTA chest shows mediastinal lymphadenopathy  · Per pulmonology, recommend repeat CT imaging in 3 months  · Will need follow up outpatient with PCP or Pulmonology for lung screenings  · Has follow-up appointment scheduled    History of leukocytosis  Assessment & Plan  · Chronic leukocytosis, outpatient f/u with Hematology      CONSULTING PROVIDERS   IP CONSULT TO NUTRITION SERVICES  IP CONSULT TO CARDIOLOGY  IP CONSULT TO PULMONOLOGY    PROCEDURES PERFORMED  Procedure(s) (LRB):  Cardiac catheterization (N/A)    RADIOLOGY RESULTS  XR chest 2 views    Result Date: 6/3/2022    Impression: Right basal opacity suspicious for infiltrate/atelectasis and small effusion Workstation performed: [id number]     Cardiac catheterization    Result Date: 6/6/2022  Narrative: Normal epicardial coronary arteries    CTA ED chest PE study    Result Date: 6/3/2022  Narrative: CTA - CHEST WITH IV CONTRAST - PULMONARY    Impression: No evidence of pulmonary artery embolus Trace right-sided pleural effusion Mild right lower lobe bronchitis Mediastinal lymphadenopathy    Follow-up is recommended  Workstation performed: [id number]     Echo complete    Result Date: 6/3/2022  Narrative: Javan Gtz  Left Ventricle: Left ventricular cavity size is mildly dilated  Wall thickness is normal  The left ventricular ejection fraction is 28% by biplane measurement  Systolic function is severely reduced  Global longitudinal strain is reduced at -9%  Relative apical sparing is noted  There is severe global hypokinesis with specific regional wall abnormalities  Diastolic function is severely abnormal, consistent with grade 3 diastolic dysfunction  Left atrial filling pressure is elevated    The following segments are akinetic: basal inferoseptal, mid inferoseptal and mid inferior    The following segments are hypokinetic: basal anterior, basal anteroseptal, basal inferior, basal inferolateral, basal anterolateral, mid anterior, mid anteroseptal, mid inferolateral, mid anterolateral, apical anterior, apical septal, apical inferior, apical lateral and apex    Right Ventricle: Right ventricular cavity size is mildly dilated  Systolic function is mildly to moderately reduced  Abnormal tricuspid annular plane systolic excursion (TAPSE) < 1 7 cm    Left Atrium: The atrium is severely dilated (>48 mL/m2)    Right Atrium: The atrium is dilated    Atrial Septum: There is the suggestion of a moderately-sized patent foramen ovale with predominant left to right shunting by color Doppler    Mitral Valve: There is mild regurgitation    Tricuspid Valve: There is moderate regurgitation  The right ventricular systolic pressure is moderately elevated  The estimated right ventricular systolic pressure is 54 04 mmHg    IVC/SVC: The right atrial pressure is estimated at 15 0 mmHg  The inferior vena cava is dilated  Respirophasic changes in dimension were absent         LABS  Results from last 7 days   Lab Units 06/06/22  0610 06/05/22  1334 06/02/22  2234   WBC Thousand/uL 15 32* 16 52* 12 88*   HEMOGLOBIN g/dL 11 2* 11 8 11 7 HEMATOCRIT % 36 6 38 6 38 1   MCV fL 79* 82 83   TOTAL NEUT ABS Thousand/uL  --  15 36*  --    BANDS PCT %  --  2  --    PLATELETS Thousands/uL 583* 594* 486*     Results from last 7 days   Lab Units 06/06/22  0610 06/05/22  1334 06/02/22  2234   SODIUM mmol/L 138 137 137   POTASSIUM mmol/L 3 8 5 0 4 8   CHLORIDE mmol/L 100 98* 101   CO2 mmol/L 30 31 26   BUN mg/dL 35* 35* 17   CREATININE mg/dL 1 24 1 36* 0 87   CALCIUM mg/dL 8 5 8 8 8 7   EGFR ml/min/1 73sq m 46 42 72   GLUCOSE RANDOM mg/dL 88 111 105     Results from last 7 days   Lab Units 06/05/22  1334 06/03/22  0026 06/02/22  2234   HS TNI 0HR ng/L  --   --  44   HS TNI 2HR ng/L  --  43  --    HS TNI 4HR ng/L 23  --   --      Results from last 7 days   Lab Units 06/02/22  2234   NT-PRO BNP pg/mL 9,840*      Results from last 7 days   Lab Units 06/02/22  2305   D-DIMER QUANTITATIVE ug/ml FEU 1 01*             Results from last 7 days   Lab Units 06/06/22  0610   TSH 3RD GENERATON uIU/mL 5 641*   FREE T4 ng/dL 0 89     Results from last 7 days   Lab Units 06/05/22  1334 06/02/22  2305   LACTIC ACID mmol/L  --  1 2   PROCALCITONIN ng/ml 0 10  --      Results from last 7 days   Lab Units 06/02/22  2234   TRIGLYCERIDES mg/dL 78   CHOLESTEROL mg/dL 156   LDL CALC mg/dL 85   HDL mg/dL 55       Cultures:         Invalid input(s): URIBILINOGEN        Results from last 7 days   Lab Units 06/06/22  1002 06/02/22  2305   BLOOD CULTURE   --  No Growth After 4 Days  No Growth After 4 Days     INFLUENZA A PCR  Negative  --        PHYSICAL EXAM:  Vitals:   Blood Pressure: 143/80 (06/07/22 0828)  Pulse: 89 (06/07/22 0828)  Temperature: (!) 97 °F (36 1 °C) (06/07/22 0828)  Temp Source: Temporal (06/07/22 0828)  Respirations: 16 (06/07/22 0828)  Height: 5' 2" (157 5 cm) (06/03/22 0930)  Weight - Scale: 63 2 kg (139 lb 4 8 oz) (06/07/22 0600)  SpO2: 94 % (06/07/22 0828)      General: well appearing, no acute distress  HEENT: atraumatic, PERRLA, moist mucosa, normal pharynx, normal tonsils and adenoids, normal tongue, no fluid in sinuses  Neck: Trachea midline, no carotid bruit, no masses  Respiratory: normal chest wall expansion, CTA B, no r/r/w, no rubs  Cardiovascular: RRR, no m/r/g, Normal S1 and S2  Abdomen: Soft, non-tender, non-distended, normal bowel sounds in all quadrants, no hepatosplenomegaly, no tympany  Rectal: deferred  Musculoskeletal: normal ROM in upper and lower extremities  Integumentary: warm, dry, and pink, with no rash, purpura, or petechia  Heme/Lymph: no lymphadenopathy, no bruises  Neurological: Cranial Nerves II-XII grossly intact, no tics, normal sensation to pressure and light touch  Psychiatric: cooperative with normal mood, affect, and cognition      Discharge Disposition:  Home    Test Results Pending at Discharge:   Pending Labs     Order Current Status    Blood culture #1 Preliminary result    Blood culture #2 Preliminary result              Medications   · Summary of Medication Adjustments made as a result of this hospitalization:  Aldactone, losartan  · Medication Dosing Tapers - Please refer to Discharge Medication List for details on any medication dosing tapers (if applicable to patient)  · Discharge Medication List: See after visit summary for reconciled discharge medications  Diet restrictions:         Diet Orders   (From admission, onward)             Start     Ordered    06/06/22 0951  Diet Cardiovascular; Cardiac  Diet effective now        References:    Nutrtion Support Algorithm Enteral vs  Parenteral   Question Answer Comment   Diet Type Cardiovascular    Cardiac Cardiac    RD to adjust diet per protocol? Yes        06/06/22 0950              Activity restrictions: No strenuous activity  Discharge Condition: good    Outpatient Follow-Up and Discharge Instructions  See after visit summary section titled Discharge Instructions for information provided to patient and family        Code Status: Level 1 - Full Code  Discharge Statement   I spent 35 minutes discharging the patient  This time was spent on the day of discharge  Greater than 50% of total time was spent with the patient and / or family counseling and / or coordination of care  ** Please Note: This note was completed in part utilizing M-Modal Fluency Direct Software  Grammatical errors, random word insertions, spelling mistakes, and incomplete sentences may be an occasional consequence of this system secondary to software limitations, ambient noise, and hardware issues  If you have any questions or concerns about the content, text, or information contained within the body of this dictation, please contact the provider for clarification  **

## 2022-06-07 NOTE — DISCHARGE INSTRUCTIONS
Dear Tito Saleh,     It was our pleasure to care for you here at [facility]   It is our hope that we were always able to meet and exceed the expected standards for your care during your stay  You were hospitalized due to *** Congestive Heart Failure   You were cared for on the *** floor under the service of Celina Elam DO with the [facility] who covers for your primary care physician (PCP), Aurea Severance, DO, while you were hospitalized  If you have any questions or concerns related to this hospitalization, you may contact us at [phone]  For follow up, we recommend that you follow up with your primary care physician  Please review this entire discharge summary as additional general instructions may be provided later as well  However, at this time we provide for you here, the most important instructions / recommendations at discharge:     Limit your total sodium intake each day to less than 2000 mg (2 grams) per day maximum  It is very important to read food labels as there is hidden sodium in various types of foods  Also, do not add salt to your food  Sodium / salt causes fluid retention and can cause your heart failure to act up again  Limit total fluid intake to one and a half liters per day maximum (or one and a half quarts if that is easier to remember)  This includes all fluids consumed  Take your diuretic (water pill) and other prescribed heart medications as recommended  Follow up with your heart doctor within 1 week  Weigh yourself daily on the same scale with the same amount of clothing each day  If you have a weight gain > 3 lbs in a day or 5 lbs in a week, please contact your cardiologist for further instructions especially if weight gain is associated with increased shortness of breath or leg swelling    For [facility] Cardiology Patients ONLY, we suggest calling the [facility] Heart Failure Program at  instead of directly calling your cardiologist     Sincerely,     Christie Collins DO and Nurse Kate Cruz

## 2022-06-07 NOTE — ASSESSMENT & PLAN NOTE
· CTA chest shows mediastinal lymphadenopathy  · Per pulmonology, recommend repeat CT imaging in 3 months  · Will need follow up outpatient with PCP or Pulmonology for lung screenings  · Has follow-up appointment scheduled covid + for few days and c/o palpitaions

## 2022-06-08 ENCOUNTER — TELEPHONE (OUTPATIENT)
Dept: CARDIOLOGY CLINIC | Facility: CLINIC | Age: 62
End: 2022-06-08

## 2022-06-08 ENCOUNTER — TRANSITIONAL CARE MANAGEMENT (OUTPATIENT)
Dept: FAMILY MEDICINE CLINIC | Facility: CLINIC | Age: 62
End: 2022-06-08

## 2022-06-08 LAB
BACTERIA BLD CULT: NORMAL
BACTERIA BLD CULT: NORMAL

## 2022-06-08 NOTE — TELEPHONE ENCOUNTER
----- Message from Eden Madsen DO sent at 6/7/2022  2:30 PM EDT -----  Please call patient and offer post hospital follow-up with Dr Johanny Flores in 1 month  Thank you

## 2022-06-13 ENCOUNTER — TELEPHONE (OUTPATIENT)
Dept: CARDIOLOGY CLINIC | Facility: CLINIC | Age: 62
End: 2022-06-13

## 2022-06-13 NOTE — TELEPHONE ENCOUNTER
----- Message from Maritza Meredith DO sent at 6/7/2022  2:30 PM EDT -----  Please call patient and offer post hospital follow-up with Dr Corey Santiago in 1 month  Thank you

## 2022-06-22 ENCOUNTER — TELEPHONE (OUTPATIENT)
Dept: CARDIOLOGY CLINIC | Facility: CLINIC | Age: 62
End: 2022-06-22

## 2022-06-22 NOTE — TELEPHONE ENCOUNTER
----- Message from Katia Mccall DO sent at 6/7/2022  2:30 PM EDT -----  Please call patient and offer post hospital follow-up with Dr Beatriz Newman in 1 month  Thank you

## 2022-06-23 ENCOUNTER — TELEPHONE (OUTPATIENT)
Dept: OTHER | Facility: OTHER | Age: 62
End: 2022-06-23

## 2022-06-23 NOTE — TELEPHONE ENCOUNTER
Patent's daughter, Jaren Ricci called in this evening to schedule follow up appointment for patient  Please follow up with Jaren Cohn that Dr Leni Mahoney requested follow up with Dr Kathy Lane

## 2022-06-24 ENCOUNTER — TELEPHONE (OUTPATIENT)
Dept: CARDIOLOGY CLINIC | Facility: CLINIC | Age: 62
End: 2022-06-24
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
## 2022-07-05 DIAGNOSIS — I50.9 CHF (CONGESTIVE HEART FAILURE) (HCC): ICD-10-CM

## 2022-07-05 RX ORDER — NICOTINE 21 MG/24HR
1 PATCH, TRANSDERMAL 24 HOURS TRANSDERMAL DAILY
Qty: 28 PATCH | Refills: 1 | Status: SHIPPED | OUTPATIENT
Start: 2022-07-05

## 2022-07-05 RX ORDER — METOPROLOL SUCCINATE 50 MG/1
50 TABLET, EXTENDED RELEASE ORAL DAILY
Qty: 30 TABLET | Refills: 1 | Status: SHIPPED | OUTPATIENT
Start: 2022-07-05 | End: 2022-08-04 | Stop reason: SDUPTHER

## 2022-07-05 RX ORDER — SPIRONOLACTONE 25 MG/1
12.5 TABLET ORAL DAILY
Qty: 30 TABLET | Refills: 1 | Status: SHIPPED | OUTPATIENT
Start: 2022-07-05 | End: 2022-09-29 | Stop reason: SDUPTHER

## 2022-07-05 RX ORDER — LOSARTAN POTASSIUM 25 MG/1
25 TABLET ORAL DAILY
Qty: 30 TABLET | Refills: 1 | Status: SHIPPED | OUTPATIENT
Start: 2022-07-05 | End: 2022-09-13

## 2022-08-04 ENCOUNTER — OFFICE VISIT (OUTPATIENT)
Dept: CARDIOLOGY CLINIC | Facility: CLINIC | Age: 62
End: 2022-08-04
Payer: COMMERCIAL

## 2022-08-04 VITALS
HEART RATE: 95 BPM | WEIGHT: 131.4 LBS | DIASTOLIC BLOOD PRESSURE: 82 MMHG | BODY MASS INDEX: 24.18 KG/M2 | SYSTOLIC BLOOD PRESSURE: 120 MMHG | HEIGHT: 62 IN

## 2022-08-04 DIAGNOSIS — F17.210 SMOKES CIGARETTES: Chronic | ICD-10-CM

## 2022-08-04 DIAGNOSIS — J44.9 CHRONIC OBSTRUCTIVE PULMONARY DISEASE, UNSPECIFIED COPD TYPE (HCC): ICD-10-CM

## 2022-08-04 DIAGNOSIS — I50.9 CHF (CONGESTIVE HEART FAILURE) (HCC): ICD-10-CM

## 2022-08-04 DIAGNOSIS — I42.8 NON-ISCHEMIC CARDIOMYOPATHY (HCC): Primary | ICD-10-CM

## 2022-08-04 PROCEDURE — 99214 OFFICE O/P EST MOD 30 MIN: CPT

## 2022-08-04 RX ORDER — METOPROLOL SUCCINATE 50 MG/1
50 TABLET, EXTENDED RELEASE ORAL DAILY
Qty: 90 TABLET | Refills: 3 | Status: SHIPPED | OUTPATIENT
Start: 2022-08-04

## 2022-08-04 NOTE — PROGRESS NOTES
Cardiology   MD Nataliia Tracey MD Otto Fore, DO, VASILIY Davila MD Margarite Gurney, DO, Johnie Blanco DO, [facility]  -------------------------------------------------------------------  [facility]  [address], [place] [address]  [phone]  [phone]  08/04/22  Miryam Welch  YOB: 1960   MRN: [id number]      Referring Physician: Eagle Rivas DO  [address]  [address] [facility],  [address]     HPI: Miryam Welch is a 64 y o  female with:   Nonischemic, dilated cardiomyopathy, catheterization with normal epicardial vessels June 2022  EF 28% June 2022  Recent hospitalization for acute systolic heart failure  Pulmonary hypertension, group 2 in setting of dilated cardiomyopathy, plus or minus COPD  Longstanding smoking history, but quit in June 2022  Chronic alcohol use several drinks a day  Chronic leukocytosis   Mediastinal lymphadenopathy thought to be reactive in nature    She presents today for follow-up  She states she is feeling well, has quit smoking, decreased her alcohol intake, but she stopped to of her medications on her own, just because she does not feel like taking so many medications  She denies any acute issues today    Review of Systems   Constitutional: Negative for chills and fever  HENT: Negative for facial swelling and sore throat  Eyes: Negative for visual disturbance  Respiratory: Negative for cough, chest tightness, shortness of breath and wheezing  Cardiovascular: Negative for chest pain, palpitations and leg swelling  Gastrointestinal: Negative for abdominal pain, blood in stool, constipation, diarrhea, nausea and vomiting  Endocrine: Negative for cold intolerance and heat intolerance  Genitourinary: Negative for decreased urine volume, difficulty urinating, dysuria and hematuria     Musculoskeletal: Negative for arthralgias, back pain and myalgias  Skin: Negative for rash  Neurological: Negative for dizziness, syncope, weakness and numbness  Psychiatric/Behavioral: Negative for agitation, behavioral problems and confusion  The patient is not nervous/anxious  OBJECTIVE  Vitals:    08/04/22 1521   BP: 120/82   Pulse: 95       Physical Exam  Constitutional: awake, alert and oriented, in no acute distress, no obvious deformities  Head: Normocephalic, without obvious abnormality, atraumatic  Eyes: conjunctivae clear and moist  Sclera anicteric  No xanthelasmas  Pupils equal bilaterally  Extraocular motions are full  Ear nose mouth and throat: ears are symmetrical bilaterally, hearing appears to be equal bilaterally, no nasal discharge or epistaxis, oropharynx is clear with moist mucous membranes  Neck:  Trachea is midline, neck is supple, no thyromegaly or significant lymphadenopathy, there is full range of motion  Lungs: clear to auscultation bilaterally, no wheezes, no rales, no rhonchi, no accessory muscle use, breathing is nonlabored  Heart: regular rate and rhythm, S1, S2 normal, no murmur, no click, no rub and no gallop, no lower extremity edema  Abdomen: soft, non-tender; bowel sounds normal; no masses,  no organomegaly  Psychiatric:  Patient is oriented to time, place, person, mood/affect is negative for depression, anxiety, agitation, appears to have appropriate insight  Skin: Skin is warm, dry, intact  No obvious rashes or lesions on exposed extremities  Nail beds are pink with no cyanosis or clubbing  EKG:  No results found for this visit on 08/04/22       IMPRESSION:  Nonischemic, dilated cardiomyopathy, catheterization with normal epicardial vessels June 2022  EF 28% June 2022  Recent hospitalization for acute systolic heart failure  Pulmonary hypertension, group 2 in setting of dilated cardiomyopathy, plus or minus COPD  Longstanding smoking history, but quit in June 2022  Chronic alcohol use several drinks a day  Chronic leukocytosis   Mediastinal lymphadenopathy thought to be reactive in nature      DISCUSSION/RECOMMENDATIONS:  She presents today for re-evaluation  Her resting heart rate is elevated at 95 beats per minute  She does have a dilated nonischemic cardiomyopathy, would reinstitute her long-acting beta-blocker today  She stopped this on her own just because she was feeling well and did not feel like taking so many medications   Continue with spironolactone  She appears euvolemic today  Blood pressure is controlled  Plan for follow-up echo in September to re-evaluate LV systolic function with follow-up in the office thereafter    Madison Rosen DO, [facility]  --------------------------------------------------------------------------------  TREADMILL STRESS  No results found for this or any previous visit      ----------------------------------------------------------------------------------------------  NUCLEAR STRESS TEST: No results found for this or any previous visit  No results found for this or any previous visit       --------------------------------------------------------------------------------  CATH:  No results found for this or any previous visit     --------------------------------------------------------------------------------  ECHO:   No results found for this or any previous visit  No results found for this or any previous visit     --------------------------------------------------------------------------------  HOLTER  No results found for this or any previous visit      No results found for this or any previous visit     --------------------------------------------------------------------------------  CAROTIDS  No results found for this or any previous visit      --------------------------------------------------------------------------------  Diagnoses and all orders for this visit:    Non-ischemic cardiomyopathy ([address] )  -     Echo complete w/ contrast if indicated; Future    Chronic obstructive pulmonary disease, unspecified COPD type ([facility])    Smokes cigarettes    CHF (congestive heart failure) ([facility])  -     metoprolol succinate (TOPROL-XL) 50 mg 24 hr tablet; Take 1 tablet (50 mg total) by mouth daily     ======================================================    History reviewed  No pertinent past medical history  Past Surgical History:   Procedure Laterality Date    CARDIAC CATHETERIZATION N/A 6/6/2022    Procedure: Cardiac catheterization;  Surgeon: Danielito Salinas MD;  Location: [place] CARDIAC CATH LAB; Service: Cardiology    CERVICAL CONE BIOPSY      FL GUIDED NEEDLE PLAC BX/ASP/INJ  10/21/2020    HIP SURGERY      HIP SURGERY           Medications  Current Outpatient Medications   Medication Sig Dispense Refill    metoprolol succinate (TOPROL-XL) 50 mg 24 hr tablet Take 1 tablet (50 mg total) by mouth daily 90 tablet 3    nicotine (NICODERM CQ) 21 mg/24 hr TD 24 hr patch Place 1 patch on the skin daily 28 patch 1    spironolactone (ALDACTONE) 25 mg tablet Take 0 5 tablets (12 5 mg total) by mouth daily 30 tablet 1    losartan (COZAAR) 25 mg tablet Take 1 tablet (25 mg total) by mouth daily (Patient not taking: Reported on 8/4/2022) 30 tablet 1     No current facility-administered medications for this visit  Allergies   Allergen Reactions    Codeine GI Intolerance and Vomiting    Oxycodone-Acetaminophen GI Intolerance and Vomiting    Propoxyphene Vomiting       Social History     Socioeconomic History    Marital status: /Civil Union     Spouse name: Not on file    Number of children: Not on file    Years of education: Not on file    Highest education level: Not on file   Occupational History    Not on file   Tobacco Use    Smoking status: Former Smoker     Packs/day: 0 50    Smokeless tobacco: Never Used   Vaping Use    Vaping Use: Never used   Substance and Sexual Activity    Alcohol use:  Yes    Drug use: Yes     Types: Marijuana    Sexual activity: Yes     Partners: Male   Other Topics Concern    Not on file   Social History Narrative    Not on file     Social Determinants of Health     Financial Resource Strain: Not on file   Food Insecurity: Not on file   Transportation Needs: Not on file   Physical Activity: Not on file   Stress: Not on file   Social Connections: Not on file   Intimate Partner Violence: Not on file   Housing Stability: Not on file        Family History   Problem Relation Age of Onset    Diabetes Mother     COPD Mother     Hyperlipidemia Mother     Stroke Mother     Lung cancer Father        Lab Results   Component Value Date    WBC 15 32 (H) 06/06/2022    HGB 11 2 (L) 06/06/2022    HCT 36 6 06/06/2022    MCV 79 (L) 06/06/2022     (H) 06/06/2022      Lab Results   Component Value Date    SODIUM 138 06/06/2022    K 3 8 06/06/2022     06/06/2022    CO2 30 06/06/2022    BUN 35 (H) 06/06/2022    CREATININE 1 24 06/06/2022    GLUC 88 06/06/2022    CALCIUM 8 5 06/06/2022      No results found for: HGBA1C   No results found for: CHOL  Lab Results   Component Value Date    HDL 55 06/02/2022     Lab Results   Component Value Date    LDLCALC 85 06/02/2022     Lab Results   Component Value Date    TRIG 78 06/02/2022     No results found for: CHOLHDL   No results found for: INR, PROTIME       Patient Active Problem List    Diagnosis Date Noted    Tooth infection 06/04/2022    COPD (chronic obstructive pulmonary disease) ([facility] ) 06/04/2022    History of leukocytosis 06/03/2022    Non-ischemic cardiomyopathy ([facility] ) 06/03/2022    Mediastinal lymphadenopathy 06/03/2022    Smokes cigarettes 06/03/2022    Alcohol ingestion, 1-4 drinks per day 06/03/2022    Open wound of right hip 09/29/2020    Chronic 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

## 2022-09-08 ENCOUNTER — HOSPITAL ENCOUNTER (OUTPATIENT)
Dept: NON INVASIVE DIAGNOSTICS | Facility: HOSPITAL | Age: 62
Discharge: HOME | End: 2022-09-08
Payer: MEDICARE

## 2022-09-08 VITALS
WEIGHT: 131 LBS | DIASTOLIC BLOOD PRESSURE: 82 MMHG | HEART RATE: 78 BPM | SYSTOLIC BLOOD PRESSURE: 120 MMHG | HEIGHT: 62 IN | BODY MASS INDEX: 24.11 KG/M2

## 2022-09-08 DIAGNOSIS — I42.8 NON-ISCHEMIC CARDIOMYOPATHY (HCC): ICD-10-CM

## 2022-09-08 LAB
AORTIC ROOT: 2.9 CM
APICAL FOUR CHAMBER EJECTION FRACTION: 34 %
E WAVE DECELERATION TIME: 165 MS
FRACTIONAL SHORTENING: 20 (ref 28–44)
INTERVENTRICULAR SEPTUM IN DIASTOLE (PARASTERNAL SHORT AXIS VIEW): 1.1 CM
INTERVENTRICULAR SEPTUM: 1.1 CM (ref 0.6–1.1)
LAAS-AP2: 19.9 CM2
LAAS-AP4: 20.3 CM2
LEFT ATRIUM SIZE: 4.6 CM
LEFT INTERNAL DIMENSION IN SYSTOLE: 4.4 CM (ref 2.1–4)
LEFT VENTRICULAR INTERNAL DIMENSION IN DIASTOLE: 5.5 CM (ref 3.5–6)
LEFT VENTRICULAR POSTERIOR WALL IN END DIASTOLE: 1.1 CM
LEFT VENTRICULAR STROKE VOLUME: 56 ML
LVSV (TEICH): 56 ML
MV E'TISSUE VEL-SEP: 4 CM/S
MV PEAK A VEL: 0.69 M/S
MV PEAK E VEL: 42 CM/S
MV STENOSIS PRESSURE HALF TIME: 48 MS
MV VALVE AREA P 1/2 METHOD: 4.58
RIGHT ATRIUM AREA SYSTOLE A4C: 13.3 CM2
RIGHT VENTRICLE ID DIMENSION: 3.6 CM
SL CV LEFT ATRIUM LENGTH A2C: 5.2 CM
SL CV LV EF: 35
SL CV PED ECHO LEFT VENTRICLE DIASTOLIC VOLUME (MOD BIPLANE) 2D: 144 ML
SL CV PED ECHO LEFT VENTRICLE SYSTOLIC VOLUME (MOD BIPLANE) 2D: 88 ML
TR MAX PG: 18 MMHG
TR PEAK VELOCITY: 2.1 M/S
TRICUSPID VALVE PEAK REGURGITATION VELOCITY: 2.09 M/S

## 2022-09-08 PROCEDURE — 93306 TTE W/DOPPLER COMPLETE: CPT

## 2022-09-08 PROCEDURE — 93306 TTE W/DOPPLER COMPLETE: CPT | Performed by: INTERNAL MEDICINE

## 2022-09-13 ENCOUNTER — OFFICE VISIT (OUTPATIENT)
Dept: CARDIOLOGY CLINIC | Facility: CLINIC | Age: 62
End: 2022-09-13
Payer: MEDICARE

## 2022-09-13 VITALS
BODY MASS INDEX: 24.4 KG/M2 | WEIGHT: 133.4 LBS | HEART RATE: 88 BPM | DIASTOLIC BLOOD PRESSURE: 70 MMHG | SYSTOLIC BLOOD PRESSURE: 100 MMHG

## 2022-09-13 DIAGNOSIS — I42.8 NON-ISCHEMIC CARDIOMYOPATHY (HCC): Primary | ICD-10-CM

## 2022-09-13 PROCEDURE — 99214 OFFICE O/P EST MOD 30 MIN: CPT

## 2022-09-13 NOTE — PROGRESS NOTES
Cardiology   Brown Bale, MD Radene Sandifer, MD Lus Mustache, DO, VASILIY Shook MD Genaro Davies, DO, Radha Lopez DO, [facility]  -------------------------------------------------------------------  [facility]  [address]  [place], [place] [address]  [phone]  [phone]  09/13/22  Cornel Jain  YOB: 1960   MRN: [id number]      Referring Physician: Rebecca Diamond DO  [address]  [facility] [address]  [facility],  [address]     HPI: Cornel Jain is a 58 y o  female with:   Nonischemic, dilated cardiomyopathy, catheterization with normal epicardial vessels June 2022  EF 28% June 2022  EF 35% September 2022  Recent hospitalization for acute systolic heart failure  Pulmonary hypertension, group 2 in setting of dilated cardiomyopathy, plus or minus COPD  Longstanding smoking history, but quit in June 2022  Chronic alcohol use several drinks a day  Chronic leukocytosis   Mediastinal lymphadenopathy thought to be reactive in nature    She presents today for follow-up  She states she is feeling okay, notes no lightheadedness or dizziness  Follow-up echo showed improvement of EF to 35%    Review of Systems   Constitutional: Negative for chills and fever  HENT: Negative for facial swelling and sore throat  Eyes: Negative for visual disturbance  Respiratory: Negative for cough, chest tightness, shortness of breath and wheezing  Cardiovascular: Negative for chest pain, palpitations and leg swelling  Gastrointestinal: Negative for abdominal pain, blood in stool, constipation, diarrhea, nausea and vomiting  Endocrine: Negative for cold intolerance and heat intolerance  Genitourinary: Negative for decreased urine volume, difficulty urinating, dysuria and hematuria  Musculoskeletal: Negative for arthralgias, back pain and myalgias  Skin: Negative for rash     Neurological: Negative for dizziness, syncope, weakness and numbness  Psychiatric/Behavioral: Negative for agitation, behavioral problems and confusion  The patient is not nervous/anxious  OBJECTIVE  Vitals:    09/13/22 1413   BP: 100/70   Pulse: 88       Physical Exam  Constitutional: awake, alert and oriented, in no acute distress, no obvious deformities  Head: Normocephalic, without obvious abnormality, atraumatic  Eyes: conjunctivae clear and moist  Sclera anicteric  No xanthelasmas  Pupils equal bilaterally  Extraocular motions are full  Ear nose mouth and throat: ears are symmetrical bilaterally, hearing appears to be equal bilaterally, no nasal discharge or epistaxis, oropharynx is clear with moist mucous membranes  Neck:  Trachea is midline, neck is supple, no thyromegaly or significant lymphadenopathy, there is full range of motion  Lungs: clear to auscultation bilaterally, no wheezes, no rales, no rhonchi, no accessory muscle use, breathing is nonlabored  Heart: regular rate and rhythm, S1, S2 normal, no murmur, no click, no rub and no gallop, no lower extremity edema  Abdomen: soft, non-tender; bowel sounds normal; no masses,  no organomegaly  Psychiatric:  Patient is oriented to time, place, person, mood/affect is negative for depression, anxiety, agitation, appears to have appropriate insight  Skin: Skin is warm, dry, intact  No obvious rashes or lesions on exposed extremities  Nail beds are pink with no cyanosis or clubbing  EKG:  No results found for this visit on 09/13/22       IMPRESSION:  Nonischemic, dilated cardiomyopathy, catheterization with normal epicardial vessels June 2022  EF 28% June 2022  EF 35% September 2022  Recent hospitalization for acute systolic heart failure  Pulmonary hypertension, group 2 in setting of dilated cardiomyopathy, plus or minus COPD  Longstanding smoking history, but quit in June 2022  Chronic alcohol use several drinks a day  Chronic leukocytosis   Mediastinal lymphadenopathy thought to be reactive in nature    DISCUSSION/RECOMMENDATIONS:  Follow-up echo showed improvement of EF 35%, she is deferring placement of ICD at this time  Will continue with medical management  Continue with Toprol XL  Continue spironolactone   Add Jardiance 10 mg  Plan for follow-up 3 months with repeat echo to assess for interval improvement on goal-directed medical therapy for heart failure with reduced ejection fraction    Phill Alva DO, [facility]  --------------------------------------------------------------------------------  TREADMILL STRESS  No results found for this or any previous visit      ----------------------------------------------------------------------------------------------  NUCLEAR STRESS TEST: No results found for this or any previous visit  No results found for this or any previous visit       --------------------------------------------------------------------------------  CATH:  No results found for this or any previous visit     --------------------------------------------------------------------------------  ECHO:   No results found for this or any previous visit  No results found for this or any previous visit     --------------------------------------------------------------------------------  HOLTER  No results found for this or any previous visit  No results found for this or any previous visit     --------------------------------------------------------------------------------  CAROTIDS  No results found for this or any previous visit      --------------------------------------------------------------------------------  Diagnoses and all orders for this visit:    Non-ischemic cardiomyopathy ([address] )  -     Discontinue: Empagliflozin (Jardiance) 10 MG TABS; Take 1 tablet (10 mg total) by mouth every morning  -     Echo follow up/limited w/ contrast if indicated;  Future  -     Empagliflozin (Jardiance) 10 MG TABS; Take 1 tablet (10 mg total) by mouth every morning ======================================================    No past medical history on file  Past Surgical History:   Procedure Laterality Date    CARDIAC CATHETERIZATION N/A 6/6/2022    Procedure: Cardiac catheterization;  Surgeon: Mansoor Sadler MD;  Location: [place] CARDIAC [facility]ATH LAB; Service: Cardiology    CERVICAL CONE BIOPSY      FL GUIDED NEEDLE PLAC BX/ASP/INJ  10/21/2020    HIP SURGERY      HIP SURGERY           Medications  Current Outpatient Medications   Medication Sig Dispense Refill    Empagliflozin (Jardiance) 10 MG TABS Take 1 tablet (10 mg total) by mouth every morning 90 tablet 3    nicotine (NICODERM CQ) 21 mg/24 hr TD 24 hr patch Place 1 patch on the skin daily 28 patch 1    spironolactone (ALDACTONE) 25 mg tablet Take 0 5 tablets (12 5 mg total) by mouth daily 30 tablet 1    metoprolol succinate (TOPROL-XL) 50 mg 24 hr tablet Take 1 tablet (50 mg total) by mouth daily (Patient not taking: Reported on 9/13/2022) 90 tablet 3     No current facility-administered medications for this visit  Allergies   Allergen Reactions    Codeine GI Intolerance and Vomiting    Oxycodone-Acetaminophen GI Intolerance and Vomiting    Propoxyphene Vomiting       Social History     Socioeconomic History    Marital status: /Civil Union     Spouse name: Not on file    Number of children: Not on file    Years of education: Not on file    Highest education level: Not on file   Occupational History    Not on file   Tobacco Use    Smoking status: Former Smoker     Packs/day: 0 50    Smokeless tobacco: Never Used   Vaping Use    Vaping Use: Never used   Substance and Sexual Activity    Alcohol use:  Yes    Drug use: Yes     Types: Marijuana    Sexual activity: Yes     Partners: Male   Other Topics Concern    Not on file   Social History Narrative    Not on file     Social Determinants of Health     Financial Resource Strain: Not on file   Food Insecurity: Not on file   Transportation Needs: Not on file   Physical Activity: Not on file   Stress: Not on file   Social Connections: Not on file   Intimate Partner Violence: Not on file   Housing Stability: Not on file        Family History   Problem Relation Age of Onset    Diabetes Mother     COPD Mother     Hyperlipidemia Mother     Stroke Mother     Lung cancer Father        Lab Results   Component Value Date    WBC 15 32 (H) 06/06/2022    HGB 11 2 (L) 06/06/2022    HCT 36 6 06/06/2022    MCV 79 (L) 06/06/2022     (H) 06/06/2022      Lab Results   Component Value Date    SODIUM 138 06/06/2022    K 3 8 06/06/2022     06/06/2022    CO2 30 06/06/2022    BUN 35 (H) 06/06/2022    CREATININE 1 24 06/06/2022    GLUC 88 06/06/2022    CALCIUM 8 5 06/06/2022      No results found for: HGBA1C   No results found for: CHOL  Lab Results   Component Value Date    HDL 55 06/02/2022     Lab Results   Component Value Date    LDLCALC 85 06/02/2022     Lab Results   Component Value Date    TRIG 78 06/02/2022     No results found for: CHOLHDL   No results found for: INR, PROTIME       Patient Active Problem List    Diagnosis Date Noted    Tooth infection 06/04/2022    COPD (chronic obstructive pulmonary disease) [facility] ) 06/04/2022    History of leukocytosis 06/03/2022    Non-[facility] ) 06/03/2022    Mediastinal lymphadenopathy 06/03/2022    Smokes cigarettes 06/03/2022    Alcohol ingestion, 1-4 drinks per day 06/03/2022    Open wound of right hip 09/29/2020    Chronic hip pain after total replacement of right hip joint 09/29/2020       Portions of the record may have been created with voice recognition software  Occasional wrong word or "sound a like" substitutions may have occurred due to the inherent limitations of voice recognition software  Read the chart carefully and recognize, using context, where substitutions have occurred      Kirit Saleh DO, [facility]  9/13/2022 3:11 PM

## 2022-09-29 DIAGNOSIS — I50.9 CHF (CONGESTIVE HEART FAILURE) (HCC): ICD-10-CM

## 2022-09-29 RX ORDER — SPIRONOLACTONE 25 MG/1
12.5 TABLET ORAL DAILY
Qty: 30 TABLET | Refills: 3 | Status: SHIPPED | OUTPATIENT
Start: 2022-09-29 | End: 2022-10-29

## 2022-10-10 ENCOUNTER — TELEPHONE (OUTPATIENT)
Dept: CARDIOLOGY CLINIC | Facility: CLINIC | Age: 62
End: 2022-10-10

## 2022-10-10 NOTE — TELEPHONE ENCOUNTER
Prior Authorization requested for Jardiance  This was completed through [email or web address]       Donovan: Kareem Celaya ON DETERMINATION   PA CASE ID: [id number]

## 2022-10-26 DIAGNOSIS — I42.8 NON-ISCHEMIC CARDIOMYOPATHY (HCC): ICD-10-CM

## 2022-11-28 ENCOUNTER — HOSPITAL ENCOUNTER (OUTPATIENT)
Dept: NON INVASIVE DIAGNOSTICS | Facility: HOSPITAL | Age: 62
Discharge: HOME | End: 2022-11-28

## 2022-11-28 VITALS
HEART RATE: 102 BPM | SYSTOLIC BLOOD PRESSURE: 151 MMHG | BODY MASS INDEX: 24.48 KG/M2 | HEIGHT: 62 IN | WEIGHT: 133 LBS | DIASTOLIC BLOOD PRESSURE: 91 MMHG

## 2022-11-28 DIAGNOSIS — I42.8 NON-ISCHEMIC CARDIOMYOPATHY (HCC): ICD-10-CM

## 2022-11-28 LAB
AORTIC VALVE MEAN VELOCITY: 10.7 M/S
APICAL FOUR CHAMBER EJECTION FRACTION: 44 %
AV LVOT MEAN GRADIENT: 1 MMHG
AV LVOT PEAK GRADIENT: 2 MMHG
AV MEAN GRADIENT: 5 MMHG
AV PEAK GRADIENT: 7 MMHG
AV VELOCITY RATIO: 0.59
DOP CALC AO PEAK VEL: 1.33 M/S
DOP CALC AO VTI: 25.07 CM
DOP CALC LVOT PEAK VEL VTI: 14.3 CM
DOP CALC LVOT PEAK VEL: 0.79 M/S
FRACTIONAL SHORTENING: 17 (ref 28–44)
GLOBAL LONGITUIDAL STRAIN: -7 %
INTERVENTRICULAR SEPTUM IN DIASTOLE (PARASTERNAL SHORT AXIS VIEW): 1.2 CM
INTERVENTRICULAR SEPTUM: 1.2 CM (ref 0.6–1.1)
LEFT INTERNAL DIMENSION IN SYSTOLE: 4.3 CM (ref 2.1–4)
LEFT VENTRICLE DIASTOLIC VOLUME (MOD BIPLANE): 112 ML
LEFT VENTRICLE SYSTOLIC VOLUME (MOD BIPLANE): 60 ML
LEFT VENTRICULAR INTERNAL DIMENSION IN DIASTOLE: 5.2 CM (ref 3.5–6)
LEFT VENTRICULAR POSTERIOR WALL IN END DIASTOLE: 1 CM
LEFT VENTRICULAR STROKE VOLUME: 43 ML
LV EF: 47 %
LVSV (TEICH): 43 ML
RA PRESSURE ESTIMATED: 3 MMHG
SL CV LV EF: 44
SL CV PED ECHO LEFT VENTRICLE DIASTOLIC VOLUME (MOD BIPLANE) 2D: 127 ML
SL CV PED ECHO LEFT VENTRICLE SYSTOLIC VOLUME (MOD BIPLANE) 2D: 84 ML

## 2023-03-27 NOTE — CONSULTS
Consult - Cardiology   Katelin Gonzalez 64 y o  female MRN: [id number]  Unit/Bed#: E4 -01 Encounter: [id number]        Reason For Consult:  Dyspnea                 Assessment:  Acute combined systolic and diastolic CHF   New diagnosis  Cardiomyopathy-unspecified type   Echo 6/3/2022:  LVEF 28% by biplane measurements with decreased global longitudinal strain at -9%  Severe global hypokinesia seeming to spare the apex with akinesia noted of the basal inferoseptal, mid inferoseptal, and mid inferior walls  Valvular heart disease:   Moderate TR, mild MR per echo 6/3/2022  Hypertension   On no pre-hospital Rx though previously, briefly treated for same  VPCs - frequent on current telemetry  Alcohol overuse   Current consumption approximately 1 75 L per week  COPD, tobacco abuse (quit 2 weeks ago)  Mediastinal lymphadenopathy   CTA 6/3/2022:  Mediastinal lymph nodes measuring up to 2 7 cm  Anxiety symptoms    Discussion / Plan:  · Continue IV diuresis following standing weight/exam/oxygenation/symptoms/renal function  · Initiated on beta-blocker ~~> further titration guided by BP and heart rate trend  · Will begin Aldactone  · Following IV diuresis and catheterization will plan to initiate patient on ACEi/ARB  · Recommendation for cardiac catheterization for further assessment of cardiomyopathy was discussed with the patient who is agreeable to proceed ~~> will plan for Monday 6/6  · Continue to optimize electrolytes with respective potassium and magnesium goals of not less than 4 0 and 2 0   · Will begin aspirin and empiric initiation of statin while awaiting lipid profile  · Need for alcohol cessation discussed with the patient who seemed receptive  · Patient also seen by pulmonology who was initiated IV steroid bronchodilators, and empiric antibiotics with duration of use per them and primary service  · Outpatient PFTs and repeat imaging for reassessment of mediastinal lymphadenopathy as per pulmonology note        History Of Present Illness:  Sulma Lentz is a 69-year-old who indicates she has not had routine medical care for several decades  Late last year she did have a 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 and later revision  As part of that process she did have some rec with sit appointments with a medical doctor and cardiologist noting that she had for a brief amount of time been placed on antihypertensives  Her medical history was otherwise seemingly limited to tobacco abuse with probable COPD  Approximately 2-4 weeks ago the patient had begun to experience some increased effort dyspnea with some observed lower extremity edema  She had blamed her edema on increased standing and her dyspnea on tobacco use which commendably prompted her to fully quit about 2 weeks ago  Even after quitting smoking the patient had had some continued worsening of her respiratory status with development of some more recent abdominal bloating, and persistent lower extremity edema with an estimated 10 lb of weight gain  With progression of symptoms and 2 or 3 nights of orthopnea the patient ultimately came to the hospital to be evaluated  Beyond the symptoms discussed above the patient denies any chest pain, tachycardia, perceived cardiac ectopy, increased cough, fever, syncope, near-syncope, or sign of blood loss  She personally consumes about 1 75 L of vodka per week and does have a family history of CAD and congestive heart failure without greater understanding  Since arrival high sensitivity troponin levels have been rather unremarkable at 44 and 43  NT proBNP however is significantly abnormal at 9840  CBC shows mild leukocytosis with white cell count of 12 8 with normal hemoglobin at 11 7  Chest x-ray on arrival reported right basal opacity suspicious for infiltrate or atelectasis  CTA was also performed showing no evidence of pulmonary emboli    There was a trace right-sided effusion with signs of mild right lower lobe bronchitis and notation of reduced at the adenopathy up to 2 7 cm  Past Medical History:        History reviewed  No pertinent past medical history  Past Surgical History:   Procedure Laterality Date    CERVICAL CONE BIOPSY      FL GUIDED NEEDLE PLAC BX/ASP/INJ  10/21/2020    HIP SURGERY      HIP SURGERY          Allergy:        Allergies   Allergen Reactions    Codeine GI Intolerance and Vomiting    Oxycodone-Acetaminophen GI Intolerance and Vomiting    Propoxyphene Vomiting       Medications:       Prior to Admission medications    Medication Sig Start Date End Date Taking? Authorizing Provider   diazepam (VALIUM) 5 mg tablet 1 tablet 30 minutes prior to procedure  Patient not taking: Reported on 11/10/2020 10/20/20   Manual Rey, DO       Family History:     Family History   Problem Relation Age of Onset    Diabetes Mother     COPD Mother     Hyperlipidemia Mother     Stroke Mother     Lung cancer Father         Social History:       Social History     Socioeconomic History    Marital status: /Civil Union     Spouse name: None    Number of children: None    Years of education: None    Highest education level: None   Occupational History    None   Tobacco Use    Smoking status: Former Smoker     Packs/day: 0 50    Smokeless tobacco: Never Used   Vaping Use    Vaping Use: Never used   Substance and Sexual Activity    Alcohol use:  Yes    Drug use: Yes     Types: Marijuana    Sexual activity: Yes     Partners: Male   Other Topics Concern    None   Social History Narrative    None     Social Determinants of Health     Financial Resource Strain: Not on file   Food Insecurity: Not on file   Transportation Needs: Not on file   Physical Activity: Not on file   Stress: Not on file   Social Connections: Not on file   Intimate Partner Violence: Not on file   Housing Stability: Not on file       ROS:  Symptoms per HPI  Intermittent feelings of anxiety  The remainder of the review of systems is negative    Exam:  General:  Alert, appropriately conversant, cooperative and comfortable appearing at the time of my visit  Head: Normocephalic, atraumatic  Eyes:  EOMI  Pupils - equal, round, reactive to accomodation  No icterus  Normal Conjunctiva  Oropharynx: Moist without lesion  Neck:  No gross bruit, JVD, thyromegaly, or lymphadenopathy  Heart:  Regular with controlled rate  No rub nor pathologic murmur  Lungs: At least moderate excursion air exchange with some mild bibasilar rales  Abdomen:  Soft and nontender with normal bowel sounds  No organomegaly or mass  Lower Limbs:  1+ pitting edema  Pulses[de-identified]  RLE - DP:  2+                 LLE - DP:  2+  Musculoskeletal: Independent movement of limbs observed, Formal ROM and strength eval not performed  Neurologic:    Oriented to: person, place, situation  Cranial Nerves: grossly intact - vision, smell, taste, and hearing were not tested       Motor function: grossly normal, symmetric   Sensation: Was not tested      Vitals:    06/03/22 0751 06/03/22 0930 06/03/22 0940 06/03/22 1100   BP: 141/96 141/96  160/92   BP Location: Left arm   Left arm   Pulse: 101 101  102   Resp: 22   18   Temp: 97 7 °F (36 5 °C)   98 5 °F (36 9 °C)   TempSrc: Temporal   Temporal   SpO2: 92%  100% 96%   Weight:  64 kg (141 lb)     Height:  5' 2" (1 575 m)             DATA:      -----------  ECG:                      -----------------------------------------------------------------------------------------------------------------------------------------------  Telemetry:   Sinus rhythm with ventricular rate trend around 80 beats per minute with frequent VPCs               -----------------------------------------------------------------------------------------------------------------------------------------------  Echocardiogram  6/3/2022, [facility]  Interpretation Summary     Left Ventricle: Left ventricular cavity size is mildly dilated  Wall thickness is normal  The left ventricular ejection fraction is 28% by biplane measurement  Systolic function is severely reduced  Global longitudinal strain is reduced at -9%  Relative apical sparing is noted  There is severe global hypokinesis with specific regional wall abnormalities  Diastolic function is severely abnormal, consistent with grade 3 diastolic dysfunction  Left atrial filling pressure is elevated    The following segments are akinetic: basal inferoseptal, mid inferoseptal and mid inferior    The following segments are hypokinetic: basal anterior, basal anteroseptal, basal inferior, basal inferolateral, basal anterolateral, mid anterior, mid anteroseptal, mid inferolateral, mid anterolateral, apical anterior, apical septal, apical inferior, apical lateral and apex    Right Ventricle: Right ventricular cavity size is mildly dilated  Systolic function is mildly to moderately reduced  Abnormal tricuspid annular plane systolic excursion (TAPSE) < 1 7 cm    Left Atrium: The atrium is severely dilated (>48 mL/m2)    Right Atrium: The atrium is dilated    Atrial Septum: There is the suggestion of a moderately-sized patent foramen ovale with predominant left to right shunting by color Doppler    Mitral Valve: There is mild regurgitation    Tricuspid Valve: There is moderate regurgitation  The right ventricular systolic pressure is moderately elevated  The estimated right ventricular systolic pressure is 53 10 mmHg    IVC/SVC: The right atrial pressure is estimated at 15 0 mmHg  The inferior vena cava is dilated  Respirophasic changes in dimension were absent  Findings  Left Ventricle Left ventricular cavity size is mildly dilated  Wall thickness is normal  The left ventricular ejection fraction is 28% by biplane measurement  Systolic function is severely reduced  Global longitudinal strain is reduced at -9%  Relative apical sparing is noted   There is severe global hypokinesis with specific regional wall abnormalities  Diastolic function is severely abnormal, consistent with grade 3 diastolic dysfunction  Left atrial filling pressure is elevated  Wall Scoring Baseline  The following segments are akinetic: basal inferoseptal, mid inferoseptal and mid inferior  The following segments are hypokinetic: basal anterior, basal anteroseptal, basal inferior, basal inferolateral, basal anterolateral, mid anterior, mid anteroseptal, mid inferolateral, mid anterolateral, apical anterior, apical septal, apical inferior, apical lateral and apex  Right Ventricle Right ventricular cavity size is mildly dilated  Systolic function is mildly to moderately reduced  Abnormal tricuspid annular plane systolic excursion (TAPSE) < 1 7 cm  The ejection fraction is mildly to moderately reduced  Left Atrium The atrium is severely dilated (>48 mL/m2)  Right Atrium The atrium is dilated  Atrial Septum There is the suggestion of a moderately-sized patent foramen ovale with predominant left to right shunting by color Doppler  Aortic Valve The aortic valve is trileaflet  The leaflets are not thickened  The leaflets are not calcified  The leaflets exhibit normal mobility  There is no evidence of regurgitation  The aortic valve has no significant stenosis  Mitral Valve The mitral valve has normal structure and function  There is mild thickening of the anterior leaflet and posterior leaflet limited to the leaflet margin  There is mild regurgitation  There is no evidence of stenosis  Tricuspid Valve Tricuspid valve structure is normal  There is moderate regurgitation  There is no evidence of stenosis  The right ventricular systolic pressure is moderately elevated  The estimated right ventricular systolic pressure is 81 81 mmHg  Pulmonic Valve Pulmonic valve structure is normal  There is trace regurgitation  There is no evidence of stenosis     Ascending Aorta The aortic root is normal in size  The ascending aorta is normal in size  IVC/SVC The right atrial pressure is estimated at 15 0 mmHg  The inferior vena cava is dilated  Respirophasic changes in dimension were absent  Pericardium There is no pericardial effusion  The pericardium is normal in appearance  -----------------------------------------------------------------------------------------------------------------------------------------------  Weights: Wt Readings from Last 20 Encounters:   06/03/22 64 kg (141 lb)   11/17/20 59 kg (130 lb)   11/10/20 59 kg (130 lb)   10/13/20 58 5 kg (129 lb)   09/29/20 58 6 kg (129 lb 3 2 oz)   , Body mass index is 25 79 kg/m²           Lab Studies:               Results from last 7 days   Lab Units 06/02/22  2234   WBC Thousand/uL 12 88*   HEMOGLOBIN g/dL 11 7   HEMATOCRIT % 38 1   PLATELETS Thousands/uL 486*   ,   Results from last 7 days   Lab Units 06/02/22  2234   POTASSIUM mmol/L 4 8   CHLORIDE mmol/L 101   CO2 mmol/L 26   BUN mg/dL 17   CREATININE mg/dL 0 87   CALCIUM mg/dL 8 7 Statement Selected

## 2023-04-04 ENCOUNTER — APPOINTMENT (EMERGENCY)
Dept: RADIOLOGY | Facility: HOSPITAL | Age: 63
End: 2023-04-04

## 2023-04-04 ENCOUNTER — APPOINTMENT (EMERGENCY)
Dept: CT IMAGING | Facility: HOSPITAL | Age: 63
End: 2023-04-04

## 2023-04-04 ENCOUNTER — HOSPITAL ENCOUNTER (INPATIENT)
Facility: HOSPITAL | Age: 63
LOS: 3 days | Discharge: HOME | End: 2023-04-07
Attending: EMERGENCY MEDICINE

## 2023-04-04 DIAGNOSIS — I50.9 CHF (CONGESTIVE HEART FAILURE) (HCC): Primary | ICD-10-CM

## 2023-04-04 DIAGNOSIS — I50.23 ACUTE ON CHRONIC SYSTOLIC HEART FAILURE (HCC): ICD-10-CM

## 2023-04-04 LAB
2HR DELTA HS TROPONIN: 1 NG/L
4HR DELTA HS TROPONIN: 3 NG/L
ALBUMIN SERPL BCP-MCNC: 3.9 G/DL (ref 3.5–5)
ALP SERPL-CCNC: 126 U/L (ref 34–104)
ALT SERPL W P-5'-P-CCNC: 18 U/L (ref 7–52)
ANION GAP SERPL CALCULATED.3IONS-SCNC: 7 MMOL/L (ref 4–13)
AST SERPL W P-5'-P-CCNC: 33 U/L (ref 13–39)
ATRIAL RATE: 107 BPM
ATRIAL RATE: 115 BPM
ATRIAL RATE: 99 BPM
BASOPHILS # BLD AUTO: 0.11 THOUSANDS/ÂΜL (ref 0–0.1)
BASOPHILS NFR BLD AUTO: 1 % (ref 0–1)
BILIRUB SERPL-MCNC: 0.51 MG/DL (ref 0.2–1)
BNP SERPL-MCNC: 2883 PG/ML (ref 0–100)
BUN SERPL-MCNC: 19 MG/DL (ref 5–25)
CALCIUM SERPL-MCNC: 9.3 MG/DL (ref 8.4–10.2)
CARDIAC TROPONIN I PNL SERPL HS: 36 NG/L
CARDIAC TROPONIN I PNL SERPL HS: 37 NG/L
CARDIAC TROPONIN I PNL SERPL HS: 39 NG/L
CHLORIDE SERPL-SCNC: 107 MMOL/L (ref 96–108)
CO2 SERPL-SCNC: 22 MMOL/L (ref 21–32)
CREAT SERPL-MCNC: 1.28 MG/DL (ref 0.6–1.3)
D DIMER PPP FEU-MCNC: 1.14 UG/ML FEU
EOSINOPHIL # BLD AUTO: 0.2 THOUSAND/ÂΜL (ref 0–0.61)
EOSINOPHIL NFR BLD AUTO: 2 % (ref 0–6)
ERYTHROCYTE [DISTWIDTH] IN BLOOD BY AUTOMATED COUNT: 15.9 % (ref 11.6–15.1)
FLUAV RNA RESP QL NAA+PROBE: NEGATIVE
FLUBV RNA RESP QL NAA+PROBE: NEGATIVE
GFR SERPL CREATININE-BSD FRML MDRD: 44 ML/MIN/1.73SQ M
GLUCOSE SERPL-MCNC: 84 MG/DL (ref 65–140)
HCT VFR BLD AUTO: 39.7 % (ref 34.8–46.1)
HGB BLD-MCNC: 12.9 G/DL (ref 11.5–15.4)
IMM GRANULOCYTES # BLD AUTO: 0.04 THOUSAND/UL (ref 0–0.2)
IMM GRANULOCYTES NFR BLD AUTO: 0 % (ref 0–2)
LYMPHOCYTES # BLD AUTO: 1.84 THOUSANDS/ÂΜL (ref 0.6–4.47)
LYMPHOCYTES NFR BLD AUTO: 17 % (ref 14–44)
MCH RBC QN AUTO: 31.2 PG (ref 26.8–34.3)
MCHC RBC AUTO-ENTMCNC: 32.5 G/DL (ref 31.4–37.4)
MCV RBC AUTO: 96 FL (ref 82–98)
MONOCYTES # BLD AUTO: 1.05 THOUSAND/ÂΜL (ref 0.17–1.22)
MONOCYTES NFR BLD AUTO: 10 % (ref 4–12)
NEUTROPHILS # BLD AUTO: 7.83 THOUSANDS/ÂΜL (ref 1.85–7.62)
NEUTS SEG NFR BLD AUTO: 70 % (ref 43–75)
NRBC BLD AUTO-RTO: 0 /100 WBCS
P AXIS: 58 DEGREES
P AXIS: 60 DEGREES
P AXIS: 61 DEGREES
PLATELET # BLD AUTO: 528 THOUSANDS/UL (ref 149–390)
PLATELET # BLD AUTO: 564 THOUSANDS/UL (ref 149–390)
PMV BLD AUTO: 10.1 FL (ref 8.9–12.7)
PMV BLD AUTO: 10.2 FL (ref 8.9–12.7)
POTASSIUM SERPL-SCNC: 4.5 MMOL/L (ref 3.5–5.3)
PR INTERVAL: 126 MS
PR INTERVAL: 132 MS
PR INTERVAL: 134 MS
PROT SERPL-MCNC: 7.5 G/DL (ref 6.4–8.4)
QRS AXIS: 28 DEGREES
QRS AXIS: 49 DEGREES
QRS AXIS: 57 DEGREES
QRSD INTERVAL: 86 MS
QRSD INTERVAL: 88 MS
QRSD INTERVAL: 88 MS
QT INTERVAL: 334 MS
QT INTERVAL: 336 MS
QT INTERVAL: 344 MS
QTC INTERVAL: 441 MS
QTC INTERVAL: 448 MS
QTC INTERVAL: 462 MS
RBC # BLD AUTO: 4.14 MILLION/UL (ref 3.81–5.12)
RSV RNA RESP QL NAA+PROBE: NEGATIVE
SARS-COV-2 RNA RESP QL NAA+PROBE: NEGATIVE
SODIUM SERPL-SCNC: 136 MMOL/L (ref 135–147)
T WAVE AXIS: 121 DEGREES
T WAVE AXIS: 123 DEGREES
T WAVE AXIS: 162 DEGREES
VENTRICULAR RATE: 107 BPM
VENTRICULAR RATE: 115 BPM
VENTRICULAR RATE: 99 BPM
WBC # BLD AUTO: 11.07 THOUSAND/UL (ref 4.31–10.16)

## 2023-04-04 RX ORDER — ENOXAPARIN SODIUM 100 MG/ML
40 INJECTION SUBCUTANEOUS DAILY
Status: DISCONTINUED | OUTPATIENT
Start: 2023-04-05 | End: 2023-04-07 | Stop reason: HOSPADM

## 2023-04-04 RX ORDER — DOCUSATE SODIUM 100 MG/1
100 CAPSULE, LIQUID FILLED ORAL 2 TIMES DAILY
Status: DISCONTINUED | OUTPATIENT
Start: 2023-04-05 | End: 2023-04-07 | Stop reason: HOSPADM

## 2023-04-04 RX ORDER — ONDANSETRON 2 MG/ML
4 INJECTION INTRAMUSCULAR; INTRAVENOUS EVERY 6 HOURS PRN
Status: DISCONTINUED | OUTPATIENT
Start: 2023-04-04 | End: 2023-04-07 | Stop reason: HOSPADM

## 2023-04-04 RX ORDER — FOLIC ACID 1 MG/1
1 TABLET ORAL DAILY
Status: DISCONTINUED | OUTPATIENT
Start: 2023-04-05 | End: 2023-04-07 | Stop reason: HOSPADM

## 2023-04-04 RX ORDER — ACETAMINOPHEN 325 MG/1
650 TABLET ORAL EVERY 4 HOURS PRN
Status: DISCONTINUED | OUTPATIENT
Start: 2023-04-04 | End: 2023-04-07 | Stop reason: HOSPADM

## 2023-04-04 RX ORDER — MAGNESIUM HYDROXIDE/ALUMINUM HYDROXICE/SIMETHICONE 120; 1200; 1200 MG/30ML; MG/30ML; MG/30ML
30 SUSPENSION ORAL EVERY 6 HOURS PRN
Status: DISCONTINUED | OUTPATIENT
Start: 2023-04-04 | End: 2023-04-07 | Stop reason: HOSPADM

## 2023-04-04 RX ORDER — FUROSEMIDE 10 MG/ML
40 INJECTION INTRAMUSCULAR; INTRAVENOUS 2 TIMES DAILY
Status: DISCONTINUED | OUTPATIENT
Start: 2023-04-05 | End: 2023-04-06

## 2023-04-04 RX ORDER — LANOLIN ALCOHOL/MO/W.PET/CERES
100 CREAM (GRAM) TOPICAL DAILY
Status: DISCONTINUED | OUTPATIENT
Start: 2023-04-05 | End: 2023-04-07 | Stop reason: HOSPADM

## 2023-04-04 RX ORDER — FUROSEMIDE 10 MG/ML
40 INJECTION INTRAMUSCULAR; INTRAVENOUS ONCE
Status: COMPLETED | OUTPATIENT
Start: 2023-04-04 | End: 2023-04-04

## 2023-04-04 RX ORDER — METOPROLOL SUCCINATE 50 MG/1
50 TABLET, EXTENDED RELEASE ORAL DAILY
Status: DISCONTINUED | OUTPATIENT
Start: 2023-04-05 | End: 2023-04-07 | Stop reason: HOSPADM

## 2023-04-04 RX ADMIN — IOHEXOL 85 ML: 350 INJECTION, SOLUTION INTRAVENOUS at 19:50

## 2023-04-04 RX ADMIN — FUROSEMIDE 40 MG: 10 INJECTION, SOLUTION INTRAVENOUS at 18:46

## 2023-04-04 NOTE — ED NOTES
Patient transported to [facility] [address], [address] [place] [address]  04/04/23 1940

## 2023-04-04 NOTE — ED PROVIDER NOTES
Pt Name: Ellery Bence  MRN: [id number]  [place] 1960  Age/Sex: 58 y o  female  Date of evaluation: 4/4/2023  PCP: Kristal Russo, [address] [place] [address]    Chief Complaint   Patient presents with   • Shortness of Breath     Pt reports being unable to lay down to sleep and feeling short of breath all the time for the past week  Pt  Takes lasix intermittently  HPI    Dragan Alvarado presents to the Emergency Department complaining of SOB  She has been diagnosed with cardiomyopathy and CHF  She is not taking her prescribed medications and she missed her last cardiology follow up  She did recently seem pulm though  She also had a recent fall and had been having rib pain but she feels that the pain from that is improving  HPI      Past Medical and Surgical History    Past Medical History:   Diagnosis Date   • CHF (congestive heart failure) ([facility] [address] )    • Hypertension        Past Surgical History:   Procedure Laterality Date   • CARDIAC CATHETERIZATION N/A 6/6/2022    Procedure: Cardiac catheterization;  Surgeon: Bina Alva MD;  Location: [facility] C[facility]IAC [facility]; Service: Cardiology   • CERVICAL CONE BIOPSY     • FL GUIDED NEEDLE PLAC BX/ASP/INJ  10/21/2020   • HIP SURGERY     • HIP SURGERY         Family History   Problem Relation Age of Onset   • Diabetes Mother    • COPD Mother    • Hyperlipidemia Mother    • Stroke Mother    • Lung cancer Father        Social History     Tobacco Use   • Smoking status: Former     Packs/day: 0 50     Types: Cigarettes   • Smokeless tobacco: Never   Vaping Use   • Vaping Use: Never used   Substance Use Topics   • Alcohol use: Yes     Alcohol/week: 4 0 standard drinks     Types: 4 Shots of liquor per week   • Drug use: Yes     Types: Marijuana               Allergies    Allergies   Allergen Reactions   • Codeine GI Intolerance and Vomiting   • Oxycodone-Acetaminophen GI Intolerance and Vomiting   • Propoxyphene Vomiting       Home Medications    Prior to Admission medications    Medication Sig Start Date End Date Taking? Authorizing Provider   Empagliflozin (Jardiance) 10 MG TABS tablet Take 1 tablet (10 mg total) by mouth every morning 10/27/22   Kobe Husbands, DO   metoprolol succinate (TOPROL-XL) 50 mg 24 hr tablet Take 1 tablet (50 mg total) by mouth daily  Patient not taking: Reported on 9/13/2022 8/4/22   Kobe Traviss, DO   nicotine (NICODERM CQ) 21 mg/24 hr TD 24 hr patch Place 1 patch on the skin daily 7/5/22   Dash Marrero DO   spironolactone (ALDACTONE) 25 mg tablet Take 0 5 tablets (12 5 mg total) by mouth daily 9/29/22 10/29/22  Kobe Husbands, DO           Review of Systems    Review of Systems   Constitutional: Negative for chills and fever  HENT: Negative for ear pain and sore throat  Eyes: Negative for pain and visual disturbance  Respiratory: Positive for chest tightness and shortness of breath  Negative for cough  Cardiovascular: Negative for chest pain and palpitations  Gastrointestinal: Negative for abdominal pain and vomiting  Genitourinary: Negative for dysuria and hematuria  Musculoskeletal: Negative for arthralgias and back pain  Skin: Negative for color change and rash  Neurological: Negative for seizures and syncope  All other systems reviewed and are negative  Physical Exam      ED Triage Vitals [04/04/23 1649]   Temperature Pulse Respirations Blood Pressure SpO2   98 1 °F (36 7 °C) (!) 113 (!) 24 (!) 151/108 97 %      Temp Source Heart Rate Source Patient Position - Orthostatic VS BP Location FiO2 (%)   Oral Monitor Sitting Right arm --      Pain Score       No Pain               Physical Exam  Vitals and nursing note reviewed  Constitutional:       General: She is not in acute distress  Appearance: She is well-developed  HENT:      Head: Normocephalic and atraumatic  Eyes:      Conjunctiva/sclera: Conjunctivae normal    Cardiovascular:      Rate and Rhythm: Regular rhythm   Tachycardia present  Heart sounds: No murmur heard  Pulmonary:      Effort: Tachypnea present  No respiratory distress  Breath sounds: Decreased breath sounds present  Abdominal:      Palpations: Abdomen is soft  Tenderness: There is no abdominal tenderness  Musculoskeletal:         General: No swelling  Cervical back: Neck supple  Skin:     General: Skin is warm and dry  Capillary Refill: Capillary refill takes less than 2 seconds  Neurological:      Mental Status: She is alert  Psychiatric:         Mood and Affect: Mood normal                 Assessment and Plan    Christopher Yan is a 58 y o  female who presents with complaint of orthopnea  Plan will be to perform diagnostic testing and treat symptomatically  MDM  Number of Diagnoses or Management Options  CHF (congestive heart failure) ([facility] [address] )  Diagnosis management comments: History obtained from the patient and her daughter  Old records (cardiology notes and echos) reviewed  Labs and imaging ordered and reviewed  I discussed results and plan with patient  I discussed case with SLIM for admission  Diagnostic Results        EKG INTERPRETATION  EKG Interpretation      EKG interpreted by me  Interpretation by Philomena Walter DO  EKG reviewed and interpreted independently      Labs:    Results for orders placed or performed during the hospital encounter of 04/04/23   CBC and differential   Result Value Ref Range    WBC 11 07 (H) 4 31 - 10 16 Thousand/uL    RBC 4 14 3 81 - 5 12 Million/uL    Hemoglobin 12 9 11 5 - 15 4 g/dL    Hematocrit 39 7 34 8 - 46 1 %    MCV 96 82 - 98 fL    MCH 31 2 26 8 - 34 3 pg    MCHC 32 5 31 4 - 37 4 g/dL    RDW 15 9 (H) 11 6 - 15 1 %    MPV 10 2 8 9 - 12 7 fL    Platelets 153 (H) 704 - 390 Thousands/uL    nRBC 0 /100 WBCs    Neutrophils Relative 70 43 - 75 %    Immat GRANS % 0 0 - 2 %    Lymphocytes Relative 17 14 - 44 %    Monocytes Relative 10 4 - 12 %    Eosinophils Relative 2 0 - "6 %    Basophils Relative 1 0 - 1 %    Neutrophils Absolute 7 83 (H) 1 85 - 7 62 Thousands/µL    Immature Grans Absolute 0 04 0 00 - 0 20 Thousand/uL    Lymphocytes Absolute 1 84 0 60 - 4 47 Thousands/µL    Monocytes Absolute 1 05 0 17 - 1 22 Thousand/µL    Eosinophils Absolute 0 20 0 00 - 0 61 Thousand/µL    Basophils Absolute 0 11 (H) 0 00 - 0 10 Thousands/µL   Comprehensive metabolic panel   Result Value Ref Range    Sodium 136 135 - 147 mmol/L    Potassium 4 5 3 5 - 5 3 mmol/L    Chloride 107 96 - 108 mmol/L    CO2 22 21 - 32 mmol/L    ANION GAP 7 4 - 13 mmol/L    BUN 19 5 - 25 mg/dL    Creatinine 1 28 0 60 - 1 30 mg/dL    Glucose 84 65 - 140 mg/dL    Calcium 9 3 8 4 - 10 2 mg/dL    AST 33 13 - 39 U/L    ALT 18 7 - 52 U/L    Alkaline Phosphatase 126 (H) 34 - 104 U/L    Total Protein 7 5 6 4 - 8 4 g/dL    Albumin 3 9 3 5 - 5 0 g/dL    Total Bilirubin 0 51 0 20 - 1 00 mg/dL    eGFR 44 ml/min/1 73sq m   HS Troponin 0hr (reflex protocol)   Result Value Ref Range    hs TnI 0hr 36 \"Refer to ACS Flowchart\"- see link ng/L   B-Type Natriuretic Peptide(BNP)   Result Value Ref Range    BNP 2,883 (H) 0 - 100 pg/mL   D-dimer, quantitative   Result Value Ref Range    D-Dimer, Quant 1 14 (H) <0 50 ug/ml FEU   ECG 12 lead   Result Value Ref Range    Ventricular Rate 115 BPM    Atrial Rate 115 BPM    FL Interval 126 ms    QRSD Interval 88 ms    QT Interval 334 ms    QTC Interval 462 ms    P Axis 60 degrees    QRS Axis 49 degrees    T Wave Axis 162 degrees   ECG 12 lead   Result Value Ref Range    Ventricular Rate 99 BPM    Atrial Rate 99 BPM    FL Interval 132 ms    QRSD Interval 88 ms    QT Interval 344 ms    QTC Interval 441 ms    P Axis 61 degrees    QRS Axis 28 degrees    T Wave Axis 123 degrees       All labs reviewed and utilized in the medical decision making process    Radiology:    CTA ED chest PE study   Final Result      No pulmonary embolus  Diffuse septal thickening due to interstitial edema        Stable mild " right paratracheal lymphadenopathy and enlarged subcarinal node since April 2023  Stability over one year suggests a benign etiology  Healing left lateral 6th rib fracture  Workstation performed: [id number]         XR chest 2 views    (Results Pending)     Pulmonary edema R>L    All radiology studies independently viewed by me and interpreted by the radiologist     Procedure    Procedures      ED Course of Care and Re-Assessments        Medications   furosemide (LASIX) injection 40 mg (40 mg Intravenous Given 4/4/23 1846)   iohexol (OMNIPAQUE) 350 MG/ML injection (SINGLE-DOSE) 85 mL (85 mL Intravenous Given 4/4/23 1950)           FINAL IMPRESSION    Final diagnoses:   CHF (congestive heart failure) ([facility] [address] )         DISPOSITION/PLAN    Time reflects when diagnosis was documented in both MDM as applicable and the Disposition within this note     Time User Action Codes Description Comment    4/4/2023  8:39 PM Andra HERNANDEZ Add [I50 9] CHF (congestive heart failure) [facility])       ED Disposition     ED Disposition   Admit    Condition   Stable    Date/Time   Tue Apr 4, 2023  8:44 PM    Comment   Case was discussed with TSERING and the patient's admission status was agreed to be Admission Status: inpatient status to the service of Dr Ashtyn Land              Follow-up Information    None                DO Jin Martin DO  04/04/23 2046

## 2023-04-05 ENCOUNTER — APPOINTMENT (INPATIENT)
Dept: NON INVASIVE DIAGNOSTICS | Facility: HOSPITAL | Age: 63
End: 2023-04-05

## 2023-04-05 LAB
ANION GAP SERPL CALCULATED.3IONS-SCNC: 9 MMOL/L (ref 4–13)
APICAL FOUR CHAMBER EJECTION FRACTION: 37 %
BUN SERPL-MCNC: 22 MG/DL (ref 5–25)
CALCIUM SERPL-MCNC: 9.1 MG/DL (ref 8.4–10.2)
CHLORIDE SERPL-SCNC: 103 MMOL/L (ref 96–108)
CO2 SERPL-SCNC: 25 MMOL/L (ref 21–32)
CREAT SERPL-MCNC: 1.42 MG/DL (ref 0.6–1.3)
E WAVE DECELERATION TIME: 167 MS
ERYTHROCYTE [DISTWIDTH] IN BLOOD BY AUTOMATED COUNT: 15.7 % (ref 11.6–15.1)
GFR SERPL CREATININE-BSD FRML MDRD: 39 ML/MIN/1.73SQ M
GLOBAL LONGITUIDAL STRAIN: -8 %
GLUCOSE SERPL-MCNC: 101 MG/DL (ref 65–140)
HCT VFR BLD AUTO: 38.9 % (ref 34.8–46.1)
HGB BLD-MCNC: 12.9 G/DL (ref 11.5–15.4)
MAGNESIUM SERPL-MCNC: 1.7 MG/DL (ref 1.9–2.7)
MCH RBC QN AUTO: 31.4 PG (ref 26.8–34.3)
MCHC RBC AUTO-ENTMCNC: 33.2 G/DL (ref 31.4–37.4)
MCV RBC AUTO: 95 FL (ref 82–98)
MV PEAK A VEL: 0.36 M/S
MV PEAK E VEL: 89 CM/S
MV STENOSIS PRESSURE HALF TIME: 48 MS
MV VALVE AREA P 1/2 METHOD: 4.58 CM2
PLATELET # BLD AUTO: 533 THOUSANDS/UL (ref 149–390)
PMV BLD AUTO: 9.8 FL (ref 8.9–12.7)
POTASSIUM SERPL-SCNC: 3.6 MMOL/L (ref 3.5–5.3)
RA PRESSURE ESTIMATED: 15 MMHG
RBC # BLD AUTO: 4.11 MILLION/UL (ref 3.81–5.12)
RV PSP: 49 MMHG
SL CV LV EF: 28
SODIUM SERPL-SCNC: 137 MMOL/L (ref 135–147)
TR MAX PG: 34 MMHG
TR PEAK VELOCITY: 2.9 M/S
TRICUSPID ANNULAR PLANE SYSTOLIC EXCURSION: 1.7 CM
TRICUSPID VALVE PEAK REGURGITATION VELOCITY: 2.91 M/S
WBC # BLD AUTO: 11.42 THOUSAND/UL (ref 4.31–10.16)

## 2023-04-05 RX ORDER — HYDRALAZINE HYDROCHLORIDE 20 MG/ML
5 INJECTION INTRAMUSCULAR; INTRAVENOUS EVERY 6 HOURS PRN
Status: DISCONTINUED | OUTPATIENT
Start: 2023-04-05 | End: 2023-04-07 | Stop reason: HOSPADM

## 2023-04-05 RX ORDER — LORAZEPAM 0.5 MG/1
0.5 TABLET ORAL EVERY 8 HOURS PRN
Status: DISCONTINUED | OUTPATIENT
Start: 2023-04-05 | End: 2023-04-07 | Stop reason: HOSPADM

## 2023-04-05 RX ADMIN — FUROSEMIDE 40 MG: 10 INJECTION, SOLUTION INTRAVENOUS at 17:08

## 2023-04-05 RX ADMIN — MULTIPLE VITAMINS W/ MINERALS TAB 1 TABLET: TAB ORAL at 08:11

## 2023-04-05 RX ADMIN — THIAMINE HCL TAB 100 MG 100 MG: 100 TAB at 08:11

## 2023-04-05 RX ADMIN — HYDRALAZINE HYDROCHLORIDE 5 MG: 20 INJECTION, SOLUTION INTRAMUSCULAR; INTRAVENOUS at 03:31

## 2023-04-05 RX ADMIN — METOPROLOL SUCCINATE 50 MG: 50 TABLET, EXTENDED RELEASE ORAL at 08:11

## 2023-04-05 RX ADMIN — FUROSEMIDE 40 MG: 10 INJECTION, SOLUTION INTRAVENOUS at 08:11

## 2023-04-05 RX ADMIN — DOCUSATE SODIUM 100 MG: 100 CAPSULE, LIQUID FILLED ORAL at 08:11

## 2023-04-05 RX ADMIN — DOCUSATE SODIUM 100 MG: 100 CAPSULE, LIQUID FILLED ORAL at 17:08

## 2023-04-05 RX ADMIN — ENOXAPARIN SODIUM 40 MG: 100 INJECTION SUBCUTANEOUS at 08:11

## 2023-04-05 RX ADMIN — FOLIC ACID 1 MG: 1 TABLET ORAL at 08:11

## 2023-04-05 NOTE — PROGRESS NOTES
[address] [place] [address]  Progress Note  Name: Rich Sanchez  MRN: [id number]  Unit/Bed#: [address] 212-02 I Date of Admission: 2023   Date of Service: 2023 I Hospital Day: 1    Assessment/Plan   * Acute on chronic systolic heart failure [facility])  Assessment & Plan  Appreciate cards help    Diuresing with IV lasix  She is still quite dyspneic on exertion  Likely will be here a few more days    COPD (chronic obstructive pulmonary disease) ([address] )  Assessment & Plan  Not an acute exacerbation    Alcohol ingestion, 1-4 drinks per day  Assessment & Plan  Last drink 1 day prior, drinks every evening  CIWA, thiamine, folic acid  Add prn ativan                 Subjective:   Still feels a lot of dyspnea with any exertion  Not SOB at rest  She is anxious  Objective:     Vitals:   Temp (24hrs), Av 1 °F (36 7 °C), Min:97 2 °F (36 2 °C), Max:98 8 °F (37 1 °C)    Temp:  [97 2 °F (36 2 °C)-98 8 °F (37 1 °C)] 97 2 °F (36 2 °C)  HR:  [] 91  Resp:  [16-24] 16  BP: (136-181)/() 136/84  SpO2:  [91 %-97 %] 95 %  Body mass index is 26 37 kg/m²  Input and Output Summary (last 24 hours): Intake/Output Summary (Last 24 hours) at 2023 1232  Last data filed at 2023 0901  Gross per 24 hour   Intake 240 ml   Output 400 ml   Net -160 ml       Physical Exam:     Physical Exam  Vitals and nursing note reviewed  HENT:      Head: Normocephalic and atraumatic  Eyes:      Pupils: Pupils are equal, round, and reactive to light  Neck:      Comments: JVD 1/2 way up  Cardiovascular:      Rate and Rhythm: Normal rate and regular rhythm  Heart sounds: No murmur heard  No friction rub  No gallop  Pulmonary:      Effort: Pulmonary effort is normal       Breath sounds: Normal breath sounds  No wheezing or rales  Abdominal:      General: Bowel sounds are normal       Palpations: Abdomen is soft  Tenderness: There is no abdominal tenderness     Musculoskeletal: Right lower leg: Edema (trace) present  Left lower leg: Edema (trace) present          Additional Data:     Labs:    Results from last 7 days   Lab Units 04/05/23  0636 04/04/23  2245 04/04/23  1840   WBC Thousand/uL 11 42*  --  11 07*   HEMOGLOBIN g/dL 12 9  --  12 9   HEMATOCRIT % 38 9  --  39 7   PLATELETS Thousands/uL 533*   < > 528*   NEUTROS PCT %  --   --  70   LYMPHS PCT %  --   --  17   MONOS PCT %  --   --  10   EOS PCT %  --   --  2    < > = values in this interval not displayed       Results from last 7 days   Lab Units 04/05/23  0636 04/04/23  1840   POTASSIUM mmol/L 3 6 4 5   CHLORIDE mmol/L 103 107   CO2 mmol/L 25 22   BUN mg/dL 22 19   CREATININE mg/dL 1 42* 1 28   CALCIUM mg/dL 9 1 9 3   ALK PHOS U/L  --  126*   ALT U/L  --  18   AST U/L  --  33                       * I Have Reviewed All Lab Data     Recent Cultures (last 7 days):             Last 24 Hours Medication List:   Current Facility-Administered Medications   Medication Dose Route Frequency Provider Last Rate   • acetaminophen  650 mg Oral Q4H PRN Bucky Zaragoza PA-C     • aluminum-magnesium hydroxide-simethicone  30 mL Oral Q6H PRN Bucky Zaragoza PA-C     • docusate sodium  100 mg Oral BID Bucky Zaragoza PA-C     • enoxaparin  40 mg Subcutaneous Daily Bucky Zaragoza PA-C     • folic acid  1 mg Oral Daily Bucky Zaragoza PA-C     • furosemide  40 mg Intravenous BID Bucky Zaragoza PA-C     • hydrALAZINE  5 mg Intravenous Q6H PRN Bucky Zaragoza PA-C     • metoprolol succinate  50 mg Oral Daily Bucky Zaragoza PA-C     • multivitamin-minerals  1 tablet Oral Daily Bucky Zaragoza PA-C     • ondansetron  4 mg Intravenous Q6H PRN Bucky Zaragoza PA-C     • thiamine  100 mg Oral Daily Bucky Zaragoza PA-C           VTE Pharmacologic Prophylaxis:   Pharmacologic: Enoxaparin (Lovenox)      Current Length of Stay: 1 day(s)    Current Patient Status: Inpatient       Discharge Plan: likely here greater than 48 hours    Code Status: Level 1 - Full Code           Today, Patient Was Seen By: Messi Puentes DO    ** Please Note: Dictation voice to text software may have been used in the creation of this document   **

## 2023-04-05 NOTE — ASSESSMENT & PLAN NOTE
Most recent TTE 11/2022: LV cavity normal mild increased wall thickness EF 19% systolic mild reduced, mild global hypokinesis   RV size normal/systolic function normal, LA/RA normal in size, AV/MV/TV without regurg

## 2023-04-05 NOTE — H&P
[facility]  H&P  Name: Ann Gaytan  MRN: [id number]  Unit/Bed#: ED-25 I Date of Admission: 4/4/2023   Date of Service: 4/5/2023 I Hospital Day: 1      Assessment/Plan   * Acute on chronic systolic heart failure [facility])  Assessment & Plan  Presents with orthopnea, dyspnea on exertion over this week  Poor medication compliance with her cardiac medications  • VS: hypertensive, afebrile, on room air   • Labs: CBC unremarkable, BMP unremarkable, BNP 2 8k, troponin 36>39  • Imaging: CTA PE without pulmonary embolus, diffuse septal thickening due to interstitial edema, stable right paratracheal lymphadenopathy/enlarged subcarinal node since April 2023, healing left lateral 6th rib fracture    Plan:   • Admit to medicine for acute on chronic heart failure   • Non compliance with outpatient regimen, last seen by [facility] cardiology 6 months ago  • Dry weight: ~135lbs Admit weight: 155lb  • Continue PTA toprol XL 50mg daily   • Diuresis with 40mg IV Lasix BID   • Trend I&Os, daily weights, electrolyte K >4, Mg >2   • Diet: cardiac sodium restriction  • Lines/Drains/Tubes: peripheral IV  • Consults: cardiology  • Case discussed with ED attending and decision was made for hospital admission  Imaging and labs reviewed by me, final interpretation provided by radiology or VRADs  Chart review completed through [employer] or Care Everywhere for PMHx, medications, ongoing treatments, or other pertinent information  Wt Readings from Last 3 Encounters:   04/04/23 68 5 kg (151 lb 0 2 oz)   11/28/22 60 3 kg (133 lb)   09/13/22 60 5 kg (133 lb 6 4 oz)       COPD (chronic obstructive pulmonary disease) (HCC)  Assessment & Plan  No wheezing on exam, on room air  Mild emphysema noted on CT  Encourage smoking cessation  Alcohol ingestion, 1-4 drinks per day  Assessment & Plan  Last drink 1 day prior, drinks every evening  CIWA, thiamine, folic acid       Non-ischemic cardiomyopathy [facility])  Assessment & Plan  Most recent TTE 11/2022: LV cavity normal mild increased wall thickness EF 18% systolic mild reduced, mild global hypokinesis  RV size normal/systolic function normal, LA/RA normal in size, AV/MV/TV without regurg       VTE Pharmacologic Prophylaxis: VTE Score: 4 Moderate Risk (Score 3-4) - Pharmacological DVT Prophylaxis Ordered: enoxaparin (Lovenox)  Code Status: Level 1 - Full Code   Discussion with family: update in AM      Anticipated Length of Stay: Patient will be admitted on an inpatient basis with an anticipated length of stay of greater than 2 midnights secondary to CHF  Total Time Spent on Date of Encounter in care of patient: 75 minutes This time was spent on one or more of the following: performing physical exam; counseling and coordination of care; obtaining or reviewing history; documenting in the medical record; reviewing/ordering tests, medications or procedures; communicating with other healthcare professionals and discussing with patient's family/caregivers  Chief Complaint: shortness of breath    History of Present Illness:  Noe Padilla is a 58 y o  female with a PMH of cardiomyopathy, COPD, alcohol abuse who presents with shortness of breath  History obtained by patient  Patient states over this past week she has had some weight gain, shortness of breath when walking some distance, unable to lie flat without becoming short of breath  She was seen by cardiology and diagnosed with heart failure and placed on medications  She has not been taking her cardiac medications, she could not tell me what the medications were  She denies sick contacts  She reports tobacco cessation  Ongoing alcohol use with ~1 drink every evening  Review of Systems:  Review of Systems   Constitutional: Negative for chills and fever  HENT: Negative for ear pain and sore throat  Eyes: Negative for pain and visual disturbance  Respiratory: Positive for shortness of breath  Negative for cough  Cardiovascular: Negative for chest pain, palpitations and leg swelling  Gastrointestinal: Negative for abdominal pain and vomiting  Genitourinary: Negative for dysuria and hematuria  Musculoskeletal: Negative for arthralgias and back pain  Skin: Negative for color change and rash  Neurological: Negative for seizures and syncope  All other systems reviewed and are negative  Past Medical and Surgical History:   Past Medical History:   Diagnosis Date   • CHF (congestive heart failure) ([facility] [address] )    • Hypertension        Past Surgical History:   Procedure Laterality Date   • CARDIAC CATHETERIZATION N/A 6/6/2022    Procedure: Cardiac catheterization;  Surgeon: Annemarie Muller MD;  Location: [facility] CARDIAC CATH LAB; Service: Cardiology   • CERVICAL CONE BIOPSY     • FL GUIDED NEEDLE PLAC BX/ASP/INJ  10/21/2020   • HIP SURGERY     • HIP SURGERY         Meds/Allergies:  Prior to Admission medications    Medication Sig Start Date End Date Taking? Authorizing Provider   Empagliflozin (Jardiance) 10 MG TABS tablet Take 1 tablet (10 mg total) by mouth every morning  Patient not taking: Reported on 4/4/2023 10/27/22   Santos Arriaga DO   metoprolol succinate (TOPROL-XL) 50 mg 24 hr tablet Take 1 tablet (50 mg total) by mouth daily  Patient not taking: Reported on 9/13/2022 8/4/22   Santos Arriaga DO   nicotine (NICODERM CQ) 21 mg/24 hr TD 24 hr patch Place 1 patch on the skin daily  Patient not taking: Reported on 4/4/2023 7/5/22   Kem Hughes DO   spironolactone (ALDACTONE) 25 mg tablet Take 0 5 tablets (12 5 mg total) by mouth daily 9/29/22 10/29/22  Santos Arriaga DO     I have reviewed home medications using recent Epic encounter  Allergies:    Allergies   Allergen Reactions   • Codeine GI Intolerance and Vomiting   • Oxycodone-Acetaminophen GI Intolerance and Vomiting   • Propoxyphene Vomiting       Social History:  Marital Status: /Civil Union   Occupation:   Patient Pre-hospital "Living Situation: Home  Patient Pre-hospital Level of Mobility: walks  Patient Pre-hospital Diet Restrictions: sodium restriction  Substance Use History:   Social History     Substance and Sexual Activity   Alcohol Use Yes   • Alcohol/week: 4 0 standard drinks   • Types: 4 Shots of liquor per week     Social History     Tobacco Use   Smoking Status Former   • Packs/day: 0 50   • Types: Cigarettes   Smokeless Tobacco Never     Social History     Substance and Sexual Activity   Drug Use Yes   • Types: Marijuana       Family History:  Family History   Problem Relation Age of Onset   • Diabetes Mother    • COPD Mother    • Hyperlipidemia Mother    • Stroke Mother    • Lung cancer Father        Physical Exam:     Vitals:   Blood Pressure: (!) 174/95 (04/05/23 0114)  Pulse: 94 (04/05/23 0114)  Temperature: 98 1 °F (36 7 °C) (04/04/23 1649)  Temp Source: Oral (04/04/23 1649)  Respirations: 18 (04/05/23 0114)  Height: 5' 2\" (157 5 cm) (04/04/23 2104)  Weight - Scale: 68 5 kg (151 lb 0 2 oz) (04/04/23 2104)  SpO2: 95 % (04/05/23 0114)    Physical Exam  Vitals and nursing note reviewed  Constitutional:       General: She is not in acute distress  Appearance: She is well-developed  HENT:      Head: Normocephalic and atraumatic  Eyes:      Conjunctiva/sclera: Conjunctivae normal    Cardiovascular:      Rate and Rhythm: Normal rate and regular rhythm  Heart sounds: No murmur heard  Pulmonary:      Effort: Pulmonary effort is normal  No respiratory distress  Breath sounds: Decreased air movement present  Abdominal:      Palpations: Abdomen is soft  Tenderness: There is no abdominal tenderness  Musculoskeletal:         General: No swelling  Cervical back: Neck supple  Right lower leg: No edema  Left lower leg: No edema  Skin:     General: Skin is warm and dry  Capillary Refill: Capillary refill takes 2 to 3 seconds     Neurological:      Mental Status: She is alert and oriented to " person, place, and time  Psychiatric:         Mood and Affect: Mood normal           Additional Data:     Lab Results:  Results from last 7 days   Lab Units 04/04/23  2245 04/04/23  1840   WBC Thousand/uL  --  11 07*   HEMOGLOBIN g/dL  --  12 9   HEMATOCRIT %  --  39 7   PLATELETS Thousands/uL 564* 528*   NEUTROS PCT %  --  70   LYMPHS PCT %  --  17   MONOS PCT %  --  10   EOS PCT %  --  2     Results from last 7 days   Lab Units 04/04/23  1840   SODIUM mmol/L 136   POTASSIUM mmol/L 4 5   CHLORIDE mmol/L 107   CO2 mmol/L 22   BUN mg/dL 19   CREATININE mg/dL 1 28   ANION GAP mmol/L 7   CALCIUM mg/dL 9 3   ALBUMIN g/dL 3 9   TOTAL BILIRUBIN mg/dL 0 51   ALK PHOS U/L 126*   ALT U/L 18   AST U/L 33   GLUCOSE RANDOM mg/dL 84                       Lines/Drains:  Invasive Devices     Peripheral Intravenous Line  Duration           Peripheral IV 04/04/23 Right;Ventral (anterior) Forearm <1 day                    Imaging: Reviewed radiology reports from this admission including: chest xray and chest CT scan and Personally reviewed the following imaging: chest xray and chest CT scan  CTA ED chest PE study   Final Result by Domenico Wadsworth MD (04/04 2025)      No pulmonary embolus  Diffuse septal thickening due to interstitial edema  Stable mild right paratracheal lymphadenopathy and enlarged subcarinal node since April 2023  Stability over one year suggests a benign etiology  Healing left lateral 6th rib fracture  Workstation performed: [id number]         XR chest 2 views    (Results Pending)       EKG and Other Studies Reviewed on Admission:   · EKG: Sinus Tachycardia    ** Please Note: This note has been constructed using a voice recognition system   **

## 2023-04-05 NOTE — PLAN OF CARE
Problem: Potential for Falls  Goal: Patient will remain free of falls  Description: INTERVENTIONS:  - Educate patient/family on patient safety including physical limitations  - Instruct patient to call for assistance with activity   - Consult OT/PT to assist with strengthening/mobility   - Keep Call bell within reach  - Keep bed low and locked with side rails adjusted as appropriate  - Keep care items and personal belongings within reach  - Initiate and maintain comfort rounds  - Make Fall Risk Sign visible to staff  - Offer Toileting every 2 Hours, in advance of need  - Initiate/Maintain  alarm  - Obtain necessary fall risk management equipment:   - Apply yellow socks and bracelet for high fall risk patients  - Consider moving patient to room near nurses station  Outcome: Progressing     Problem: INFECTION - ADULT  Goal: Absence or prevention of progression during hospitalization  Description: INTERVENTIONS:  - Assess and monitor for signs and symptoms of infection  - Monitor lab/diagnostic results  - Monitor all insertion sites, i e  indwelling lines, tubes, and drains  - Monitor endotracheal if appropriate and nasal secretions for changes in amount and color  - Luther appropriate cooling/warming therapies per order  - Administer medications as ordered  - Instruct and encourage patient and family to use good hand hygiene technique  - Identify and instruct in appropriate isolation precautions for identified infection/condition  4/5/2023 0937 by Dunia Hopper RN  Outcome: Completed    Problem: SAFETY ADULT  Goal: Patient will remain free of falls  Description: INTERVENTIONS:  - Educate patient/family on patient safety including physical limitations  - Instruct patient to call for assistance with activity   - Consult OT/PT to assist with strengthening/mobility   - Keep Call bell within reach  - Keep bed low and locked with side rails adjusted as appropriate  - Keep care items and personal belongings within reach  - Initiate and maintain comfort rounds  - Make Fall Risk Sign visible to staff  - Offer Toileting every 2 Hours, in advance of need  - Initiate/Maintain  alarm  - Obtain necessary fall risk management equipment:   - Apply yellow socks and bracelet for high fall risk patients  - Consider moving patient to room near nurses station  Outcome: Progressing     Problem: DISCHARGE PLANNING  Goal: Discharge to home or other facility with appropriate resources  Description: INTERVENTIONS:  - Identify barriers to discharge w/patient and caregiver  - Arrange for needed discharge resources and transportation as appropriate  - Identify discharge learning needs (meds, wound care, etc )  - Arrange for interpretive services to assist at discharge as needed  - Refer to Case Management Department for coordinating discharge planning if the patient needs post-hospital services based on physician/advanced practitioner order or complex needs related to functional status, cognitive ability, or social support system  Outcome: Progressing     Problem: RESPIRATORY - ADULT  Goal: Achieves optimal ventilation and oxygenation  Description: INTERVENTIONS:  - Assess for changes in respiratory status  - Assess for changes in mentation and behavior  - Position to facilitate oxygenation and minimize respiratory effort  - Oxygen administered by appropriate delivery if ordered  - Initiate smoking cessation education as indicated  - Encourage broncho-pulmonary hygiene including cough, deep breathe, Incentive Spirometry  - Assess the need for suctioning and aspirate as needed  - Assess and instruct to report SOB or any respiratory difficulty  - Respiratory Therapy support as indicated  Outcome: Progressing     Problem: CARDIOVASCULAR - ADULT  Goal: Maintains optimal cardiac output and hemodynamic stability  Description: INTERVENTIONS:  - Monitor I/O, vital signs and rhythm  - Monitor for S/S and trends of decreased cardiac output  - Administer and titrate ordered vasoactive medications to optimize hemodynamic stability  - Assess quality of pulses, skin color and temperature  - Assess for signs of decreased coronary artery perfusion  - Instruct patient to report change in severity of symptoms  Outcome: Progressing  Goal: Absence of cardiac dysrhythmias or at baseline rhythm  Description: INTERVENTIONS:  - Continuous cardiac monitoring, vital signs, obtain 12 lead EKG if ordered  - Administer antiarrhythmic and heart rate control medications as ordered  - Monitor electrolytes and administer replacement therapy as ordered  Outcome: Progressing

## 2023-04-05 NOTE — ASSESSMENT & PLAN NOTE
Appreciate cards help    Diuresing with IV lasix  She is still quite dyspneic on exertion      Likely will be here a few more days

## 2023-04-05 NOTE — CONSULTS
Cardiology Consultation  MD Clarissa Ulloa MD Terrace Crazier, DO, VASILIY Saunders MD Catha Lard, DO, Juanito Sadler DO, [facility]  ----------------------------------------------------------------  [address] [facility]  [address] [employer]   [address] 58 y o  female MRN: [id number]  Unit/Bed#: [place] [address] [place] [address] 212-02 Encounter: [id number]      04/05/23    Referring Physician: Petros Keen DO    Chief Complain/Reason for Referal: Heart failure    IMPRESSION:  · Acute on chronic heart failure with reduced ejection fraction, most recent EF 44% November 2022, moderate to severely reduced global longitudinal strain -7%  · Nonischemic, dilated cardiomyopathy, catheterization with normal epicardial vessels June 2022  · EF 28% June 2022  · EF 35% September 2022  · Pulmonary hypertension, group 2 in setting of dilated cardiomyopathy, plus or minus COPD  · Longstanding smoking history, but quit in June 2022  · Chronic alcohol use several drinks a day  · Chronic leukocytosis   · Mediastinal lymphadenopathy thought to be reactive in nature  · Thrombocytosis, platelet count is 613,543      DISCUSSION/RECOMMENDATIONS:  • She has been admitted with a 20 pound weight gain, orthopnea, dyspnea on exertion in the setting of of poor compliance with her cardiac medications    • She does have a history of nonischemic cardiomyopathy, cath showed normal epicardial vessels in June 2022, EF was as low as 20% but did improved to 44% on medical therapy when last checked in November 2022  • Her BNP on admission was 2800, cardiac enzymes were negative  • She was placed on 40 of Lasix IV twice daily no significant urine output has been documented yet  • We will hold her home dose of Jardiance  • Continue with IV Lasix 40 IV twice daily, follow urine output, renal function, weights, electrolytes  • Continue with Toprol-XL  • New echo ordered by admitting team, will review  •     Jenna Sandoval DO, [facility]    ----------------------------------------------------  EKG:Sinus tachycardia  Possible Left atrial enlargement  Left ventricular hypertrophy  T wave abnormality, consider lateral ischemia  Abnormal ECG    ======================================================    HPI:  I am seeing this patient in cardiology consultation for: Heart failure    Florin Estrella is a 58 y o  female with:   · Chronic heart failure with reduced ejection fraction, most recent EF 44% November 2022, moderate to severely reduced global longitudinal strain -7%  · Nonischemic, dilated cardiomyopathy, catheterization with normal epicardial vessels June 2022  · EF 28% June 2022  · EF 35% September 2022  · Pulmonary hypertension, group 2 in setting of dilated cardiomyopathy, plus or minus COPD  · Longstanding smoking history, but quit in June 2022  · Chronic alcohol use several drinks a day  · Chronic leukocytosis   · Mediastinal lymphadenopathy thought to be reactive in nature    She presents to the hospital with weight gain shortness of breath with exertion and orthopnea  She has a history of heart failure with reduced ejection fraction most recent EF is 44% but was as low as 28% in June 2022  She has not been taking her heart failure medications at home as well, her weight is up about 20 pounds  Her admission weight was 155 pounds, her usual dry weight is about 135 pounds  She was admitted yesterday placed on IV Lasix 40 IV twice daily and cardiology was consulted    New echo has been ordered by the admitting team     Past Medical History:   Diagnosis Date   • CHF (congestive heart failure) ([facility] [address] )    • Hypertension          Scheduled Meds:  Current Facility-Administered Medications   Medication Dose Route Frequency Provider Last Rate   • acetaminophen  650 mg Oral Q4H PRN Kike Mooney PA-C     • aluminum-magnesium hydroxide-simethicone  30 mL Oral Q6H PRN Kike Mooney PA-C     • docusate sodium  100 mg Oral BID La Ledbetter PA-C     • enoxaparin  40 mg Subcutaneous Daily La Ledbetter PA-C     • folic acid  1 mg Oral Daily La Ledbetter PA-C     • furosemide  40 mg Intravenous BID La Ledbetter PA-C     • hydrALAZINE  5 mg Intravenous Q6H PRN La Ledbetter PA-C     • metoprolol succinate  50 mg Oral Daily La Ledbetter PA-C     • multivitamin-minerals  1 tablet Oral Daily La Ledbetter PA-C     • ondansetron  4 mg Intravenous Q6H PRN La Ledbetter PA-C     • thiamine  100 mg Oral Daily La Ledbetter PA-C       Continuous Infusions:   PRN Meds: •  acetaminophen  •  aluminum-magnesium hydroxide-simethicone  •  hydrALAZINE  •  ondansetron  Allergies   Allergen Reactions   • Codeine GI Intolerance and Vomiting   • Oxycodone-Acetaminophen GI Intolerance and Vomiting   • Propoxyphene Vomiting     I reviewed the Home Medication list in the chart  Family History   Problem Relation Age of Onset   • Diabetes Mother    • COPD Mother    • Hyperlipidemia Mother    • Stroke Mother    • Lung cancer Father        Social History     Socioeconomic History   • Marital status: /Civil Union     Spouse name: Not on file   • Number of children: Not on file   • Years of education: Not on file   • Highest education level: Not on file   Occupational History   • Not on file   Tobacco Use   • Smoking status: Former     Packs/day: 0 50     Types: Cigarettes   • Smokeless tobacco: Never   Vaping Use   • Vaping Use: Never used   Substance and Sexual Activity   • Alcohol use:  Yes     Alcohol/week: 4 0 standard drinks     Types: 4 Shots of liquor per week   • Drug use: Yes     Types: Marijuana   • Sexual activity: Yes     Partners: Male   Other Topics Concern   • Not on file   Social History Narrative   • Not on file     Social Determinants of Health     Financial Resource Strain: Not on file   Food Insecurity: Not on file   Transportation Needs: Not on file   Physical Activity: Not on file   Stress: Not on file   Social Connections: Not on file   Intimate Partner Violence: Not on file   Housing Stability: Not on file       Review of Systems   Review of Systems   Constitutional: Positive for unexpected weight change  Negative for chills and fever  HENT: Negative for facial swelling and sore throat  Eyes: Negative for visual disturbance  Respiratory: Positive for shortness of breath  Negative for cough, chest tightness and wheezing  Orthopnea   Cardiovascular: Positive for leg swelling  Negative for chest pain and palpitations  Gastrointestinal: Negative for abdominal pain, blood in stool, constipation, diarrhea, nausea and vomiting  Endocrine: Negative for cold intolerance and heat intolerance  Genitourinary: Negative for decreased urine volume, difficulty urinating, dysuria and hematuria  Musculoskeletal: Negative for arthralgias, back pain and myalgias  Skin: Negative for rash  Neurological: Negative for dizziness, syncope, weakness and numbness  Psychiatric/Behavioral: Negative for agitation, behavioral problems and confusion  The patient is not nervous/anxious  Vitals:    04/05/23 0801   BP: (!) 161/105   Pulse: 101   Resp: 16   Temp: (!) 97 2 °F (36 2 °C)   SpO2: 91%     I/O       04/03 0701  04/04 0700 04/04 0701  04/05 0700 04/05 0701  04/06 0700    P  O   240     Total Intake(mL/kg)  240 (3 7)     Net  +240                Weight (last 2 days)     Date/Time Weight    04/05/23 0551 65 4 (144 18)    04/04/23 2104 68 5 (151 02)    04/04/23 1649 68 5 (151 02)          Physical Exam  Constitutional: awake, alert and oriented, in no acute distress, no obvious deformities  Head: Normocephalic, without obvious abnormality, atraumatic  Eyes: conjunctivae clear and moist  Sclera anicteric  No xanthelasmas  Pupils equal bilaterally  Extraocular motions are full    Ear nose mouth and throat: ears are symmetrical bilaterally, hearing appears to be equal bilaterally, no nasal discharge or epistaxis, oropharynx is clear with moist mucous membranes  Neck: Trachea is midline, neck is supple, no thyromegaly or significant lymphadenopathy, there is full range of motion  Lungs: decreased BS BL  Heart: regular rate and rhythm, S1, S2 normal, no murmur, no click, no rub and no gallop, 1-2+ lower extremity edema  Abdomen: soft, non-tender; bowel sounds normal; no masses, no organomegaly  Psychiatric: Patient is oriented to time, place, person, mood/affect is negative for depression, anxiety, agitation, appears to have appropriate insight  Skin: Skin is warm, dry, intact  No obvious rashes or lesions on exposed extremities  Nail beds are pink with no cyanosis or clubbing  Results from last 7 days   Lab Units 04/05/23  0636 04/04/23  2245 04/04/23  1840   WBC Thousand/uL 11 42*  --  11 07*   HEMOGLOBIN g/dL 12 9  --  12 9   HEMATOCRIT % 38 9  --  39 7   PLATELETS Thousands/uL 533* 564* 528*   NEUTROS PCT %  --   --  70   MONOS PCT %  --   --  10     Results from last 7 days   Lab Units 04/05/23  0636 04/04/23  1840   POTASSIUM mmol/L 3 6 4 5   CHLORIDE mmol/L 103 107   CO2 mmol/L 25 22   BUN mg/dL 22 19   CREATININE mg/dL 1 42* 1 28   CALCIUM mg/dL 9 1 9 3     Results from last 7 days   Lab Units 04/05/23  0636 04/04/23  1840   POTASSIUM mmol/L 3 6 4 5   CHLORIDE mmol/L 103 107   CO2 mmol/L 25 22   BUN mg/dL 22 19   CREATININE mg/dL 1 42* 1 28   CALCIUM mg/dL 9 1 9 3   ALK PHOS U/L  --  126*   ALT U/L  --  18   AST U/L  --  33     No results found for: TROPONINT                            I have personally reviewed the EKG, CXR and Telemetry images directly        Patient Active Problem List    Diagnosis Date Noted   • Acute on chronic systolic heart failure ([facility] ) 04/04/2023   • Tooth infection 06/04/2022   • COPD (chronic obstructive pulmonary disease) ([place] [address] ) 06/04/2022   • History of leukocytosis 06/03/2022   • Non-ischemic cardiomyopathy ([facility] ) 06/03/2022   • Mediastinal lymphadenopathy 06/03/2022   • Smokes "cigarettes 06/03/2022   • Alcohol ingestion, 1-4 drinks per day 06/03/2022   • Open wound of right hip 09/29/2020   • Chronic 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

## 2023-04-05 NOTE — UTILIZATION REVIEW
Initial Clinical Review    Admission: Date/Time/Statement:   Admission Orders (From admission, onward)     Ordered        04/04/23 2044  INPATIENT ADMISSION  Once                      Orders Placed This Encounter   Procedures   • INPATIENT ADMISSION     Standing Status:   Standing     Number of Occurrences:   1     Order Specific Question:   Level of Care     Answer:   Med Surg [16]     Order Specific Question:   Estimated length of stay     Answer:   More than 2 Midnights     Order Specific Question:   Certification     Answer:   I certify that inpatient services are medically necessary for this patient for a duration of greater than two midnights  See H&P and MD Progress Notes for additional information about the patient's course of treatment  ED Arrival Information     Expected   -    Arrival   4/4/2023 16:40    Acuity   Urgent            Means of arrival   Walk-In    Escorted by   Family Member    Service   Hospitalist    Admission type   Emergency            Arrival complaint   SOB           Chief Complaint   Patient presents with   • Shortness of Breath     Pt reports being unable to lay down to sleep and feeling short of breath all the time for the past week  Pt  Takes lasix intermittently  Initial Presentation: 58 y o  female   To ER from home  PMH:  Nonischemic, dilated cardiomyopathy, Pulmonary hypertension, group 2,  COPD,  Systolic HF,  Chronic leukocytosis ,   Mediastinal lymphadenopathy thought to be reactive in nature,   Thrombocytosis  presents with shortness of breath, orthopnea, dyspnea on exertion progressing,  wgt gain  over past week  Poor compliance with her cardiac medications  Reports  daily alcohol drinker  IN ER:    Exam - Decreased air movement throughout  cXR - cardiomegaly, mild chf  CTAP - interstitial edema, healing left lateral 6th rib fracture  States drinks daily,  Last intake yesterday      BNP 2 8k, troponin 36>39       Dry weight: ~135lbs Admit weight: 155lb      BNP on admission was 2800, cardiac enzymes were negative    Admit  IP Status,   MS Level of care for treatment of  Acute on chronic Systolic HF:  Cont  IV Lasix diuresis w/close monitoring of volume status  (daily wgt,  I/O q shift, trend renal indices)   Monitor/replete electrolytes prn  Limit Na intake  Trend CIWA scores (signs withdrawal - ativan prn)   Initiate thiamine, folic acid       Date:  4/5   Day 2:   still quite dyspneic on exertion;  No SOB at rest,  anxious     IV hydralazine 5 mg given @ 0331 for  HTN       4/5  CARDIOLOGY:   She has been admitted with a 20 pound weight gain  (? Period of time)  orthopnea, dyspnea on exertion in the setting of of poor compliance with her cardiac medications  no significant urine output has been documented yet       hold her home dose of Jardiance    Cont IV Lasix 40 IV twice daily, follow urine output, renal function, weights, electrolytes,  Cont home BB and fup new ECHO          ED Triage Vitals [04/04/23 1649]   Temperature Pulse Respirations Blood Pressure SpO2   98 1 °F (36 7 °C) (!) 113 (!) 24 (!) 151/108 97 %      Temp Source Heart Rate Source Patient Position - Orthostatic VS BP Location FiO2 (%)   Oral Monitor Sitting Right arm --      Pain Score       No Pain          Wt Readings from Last 1 Encounters:   04/05/23 65 4 kg (144 lb 2 9 oz)     Additional Vital Signs:   04/05/23 0956 -- 91 -- 136/84 101 95 % None (Room air)   04/05/23 0819 -- -- -- -- -- -- None (Room air)   04/05/23 08:01:02 97 2 °F (36 2 °C)  101 16 161/105 Abnormal  124 91 % None (Room air)   04/05/23 06:37:32 -- 93 -- 156/99 118 91 % --   04/05/23 05:28:14 98 2 °F (36 8 °C) 94 -- 158/108 Abnormal  125 96 % --   04/05/23 04:24:05 -- 100 -- 167/112 Abnormal  130 96 % --   04/05/23 03:18:26 98 8 °F (37 1 °C) 102 17 168/113 Abnormal  131 94 % --   04/05/23 0300 -- 102 -- 181/99 Abnormal  -- -- --   04/05/23 0205 -- 106 Abnormal  16 171/118 Abnormal  -- 96 % --   04/05/23 0114 -- 94 18 174/95 Abnormal  -- 95 % None (Room air)   04/05/23 0000 -- 100 -- 170/85 -- -- --   04/04/23 2314 -- 101 18 150/103 Abnormal  -- 95 % --   04/04/23 2242 -- -- -- -- -- 94 % None (Room air)   04/04/23 2104 -- 101 16 165/110 Abnormal  -- 95 % --   04/04/23 1904 -- 121 Abnormal  18 171/115 Abnormal  -- 95 % None (Room air)     Pertinent Labs/Diagnostic Test Results:           PRIOR TESTS:    • Nonischemic, dilated cardiomyopathy, catheterization with normal epicardial vessels June 2022  • EF 28% June 2022  • EF 35% September 2022      CTA ED chest PE study   Final Result by Jossue Uribe MD (04/04 2025)   No pulmonary embolus  Diffuse septal thickening due to interstitial edema  Stable mild right paratracheal lymphadenopathy and enlarged subcarinal node since April 2023  Stability over one year suggests a benign etiology  Healing left lateral 6th rib fracture  XR chest 2 views   Final Result by Luciana Ibrahim MD (36/81 [id number])   Cardiomegaly  Mild CHF          Results from last 7 days   Lab Units 04/04/23  2200   SARS-COV-2  Negative     Results from last 7 days   Lab Units 04/05/23  0636 04/04/23 2245 04/04/23  1840   WBC Thousand/uL 11 42*  --  11 07*   HEMOGLOBIN g/dL 12 9  --  12 9   HEMATOCRIT % 38 9  --  39 7   PLATELETS Thousands/uL 533* 564* 528*   NEUTROS ABS Thousands/µL  --   --  7 83*         Results from last 7 days   Lab Units 04/05/23  0636 04/04/23  1840   SODIUM mmol/L 137 136   POTASSIUM mmol/L 3 6 4 5   CHLORIDE mmol/L 103 107   CO2 mmol/L 25 22   ANION GAP mmol/L 9 7   BUN mg/dL 22 19   CREATININE mg/dL 1 42* 1 28   EGFR ml/min/1 73sq m 39 44   CALCIUM mg/dL 9 1 9 3   MAGNESIUM mg/dL 1 7*  --      Results from last 7 days   Lab Units 04/04/23  1840   AST U/L 33   ALT U/L 18   ALK PHOS U/L 126*   TOTAL PROTEIN g/dL 7 5   ALBUMIN g/dL 3 9   TOTAL BILIRUBIN mg/dL 0 51         Results from last 7 days   Lab Units 04/05/23  0636 04/04/23  1840   GLUCOSE RANDOM mg/dL 101 84     Results from last 7 days   Lab Units 04/04/23  2245 04/04/23 2044 04/04/23  1840   HS TNI 0HR ng/L  --   --  36   HS TNI 2HR ng/L  --  37  --    HSTNI D2 ng/L  --  1  --    HS TNI 4HR ng/L 39  --   --    HSTNI D4 ng/L 3  --   --      Results from last 7 days   Lab Units 04/04/23  1840   D-DIMER QUANTITATIVE ug/ml FEU 1 14*     Results from last 7 days   Lab Units 04/04/23  1840   BNP pg/mL 2,883*     Results from last 7 days   Lab Units 04/04/23  2200   INFLUENZA A PCR  Negative   INFLUENZA B PCR  Negative   RSV PCR  Negative   ED Treatment:   Medication Administration from 04/04/2023 1640 to 04/05/2023 8048       Date/Time Order Dose Route Action     04/04/2023 1846 EDT furosemide (LASIX) injection 40 mg 40 mg Intravenous Given        Past Medical History:   Diagnosis Date   • CHF (congestive heart failure) ([facility])    • Hypertension      Present on Admission:  • Non-ischemic cardiomyopathy ([facility])  • COPD (chronic obstructive pulmonary disease) ([facility])      Admitting Diagnosis: CHF (congestive heart failure) ([facility]) [I50 9]  SOB (shortness of breath) [R06 02]  Age/Sex: 58 y o  female  Admission Orders:  Scheduled Medications:  docusate sodium, 100 mg, Oral, BID  enoxaparin, 40 mg, Subcutaneous, Daily  folic acid, 1 mg, Oral, Daily  furosemide, 40 mg, Intravenous, BID  metoprolol succinate, 50 mg, Oral, Daily  multivitamin-minerals, 1 tablet, Oral, Daily  thiamine, 100 mg, Oral, Daily      Continuous IV Infusions:     PRN Meds:  acetaminophen, 650 mg, Oral, Q4H PRN  aluminum-magnesium hydroxide-simethicone, 30 mL, Oral, Q6H PRN  hydrALAZINE, 5 mg, Intravenous, Q6H PRN  LORazepam, 0 5 mg, Oral, Q8H PRN  ondansetron, 4 mg, Intravenous, Q6H PRN        IP CONSULT TO NUTRITION SERVICES  IP CONSULT TO CARDIOLOGY    Network Utilization Review Department  ATTENTION: Please call with any questions or concerns to [phone] and carefully listen to the prompts so that you are directed to the right person   All voicemails are confidential   Luanne Johnson all requests for admission clinical reviews, approved or denied determinations and any other requests to dedicated fax number below belonging to the campus where the patient is receiving treatment   List of dedicated fax numbers for the Facilities:  [address] DENIALS (Administrative/Medical Necessity) [phone]   [address] (Maternity/NICU/Pediatrics) [phone]   919 Caitlin Sparks [phone]   [facility] [address] [phone]   [id number] [place] [address] 28 [phone]   15[address] [facility] 134 815 [facility] [phone]

## 2023-04-05 NOTE — ASSESSMENT & PLAN NOTE
Presents with orthopnea, dyspnea on exertion over this week  Poor medication compliance with her cardiac medications  • VS: hypertensive, afebrile, on room air   • Labs: CBC unremarkable, BMP unremarkable, BNP 2 8k, troponin 36>39  • Imaging: CTA PE without pulmonary embolus, diffuse septal thickening due to interstitial edema, stable right paratracheal lymphadenopathy/enlarged subcarinal node since April 2023, healing left lateral 6th rib fracture  EKG sinus tachycardia lateral T wave inversions similar to prior, LVH    Plan:   • Admit to medicine for acute on chronic heart failure   • Non compliance with outpatient regimen, last seen by [facility] cardiology 6 months ago  • Dry weight: ~135lbs Admit weight: 155lb  • Continue PTA toprol XL 50mg daily   • Diuresis with 40mg IV Lasix BID   • Trend I&Os, daily weights, electrolyte K >4, Mg >2   • Diet: cardiac sodium restriction  • Lines/Drains/Tubes: peripheral IV  • Consults: cardiology  • Case discussed with ED attending and decision was made for hospital admission  Imaging and labs reviewed by me, final interpretation provided by radiology or VRADs  Chart review completed through [facility] or Care Everywhere for PMHx, medications, ongoing treatments, or other pertinent information    Wt Readings from Last 3 Encounters:   04/04/23 68 5 kg (151 lb 0 2 oz)   11/28/22 60 3 kg (133 lb)   09/13/22 60 5 kg (133 lb 6 4 oz)

## 2023-04-05 NOTE — PLAN OF CARE
Problem: Potential for Falls  Goal: Patient will remain free of falls  Description: INTERVENTIONS:  - Educate patient/family on patient safety including physical limitations  - Instruct patient to call for assistance with activity   - Consult OT/PT to assist with strengthening/mobility   - Keep Call bell within reach  - Keep bed low and locked with side rails adjusted as appropriate  - Keep care items and personal belongings within reach  - Initiate and maintain comfort rounds  - Make Fall Risk Sign visible to staff  - Apply yellow socks and bracelet for high fall risk patients  - Consider moving patient to room near nurses station  Outcome: Progressing     Problem: PAIN - ADULT  Goal: Verbalizes/displays adequate comfort level or baseline comfort level  Description: Interventions:  - Encourage patient to monitor pain and request assistance  - Assess pain using appropriate pain scale  - Administer analgesics based on type and severity of pain and evaluate response  - Implement non-pharmacological measures as appropriate and evaluate response  - Consider cultural and social influences on pain and pain management  - Notify physician/advanced practitioner if interventions unsuccessful or patient reports new pain  Outcome: Progressing     Problem: INFECTION - ADULT  Goal: Absence or prevention of progression during hospitalization  Description: INTERVENTIONS:  - Assess and monitor for signs and symptoms of infection  - Monitor lab/diagnostic results  - Monitor all insertion sites, i e  indwelling lines, tubes, and drains  - Monitor endotracheal if appropriate and nasal secretions for changes in amount and color  - [place] appropriate cooling/warming therapies per order  - Administer medications as ordered  - Instruct and encourage patient and family to use good hand hygiene technique  - Identify and instruct in appropriate isolation precautions for identified infection/condition  Outcome: Progressing     Problem: SAFETY ADULT  Goal: Patient will remain free of falls  Description: INTERVENTIONS:  - Educate patient/family on patient safety including physical limitations  - Instruct patient to call for assistance with activity   - Consult OT/PT to assist with strengthening/mobility   - Keep Call bell within reach  - Keep bed low and locked with side rails adjusted as appropriate  - Keep care items and personal belongings within reach  - Initiate and maintain comfort rounds  - Make Fall Risk Sign visible to staff  - Apply yellow socks and bracelet for high fall risk patients  - Consider moving patient to room near nurses station  Outcome: Progressing     Problem: DISCHARGE PLANNING  Goal: Discharge to home or other facility with appropriate resources  Description: INTERVENTIONS:  - Identify barriers to discharge w/patient and caregiver  - Arrange for needed discharge resources and transportation as appropriate  - Identify discharge learning needs (meds, wound care, etc )  - Arrange for interpretive services to assist at discharge as needed  - Refer to Case Management Department for coordinating discharge planning if the patient needs post-hospital services based on physician/advanced practitioner order or complex needs related to functional status, cognitive ability, or social support system  Outcome: Progressing     Problem: Knowledge Deficit  Goal: Patient/family/caregiver demonstrates understanding of disease process, treatment plan, medications, and discharge instructions  Description: Complete learning assessment and assess knowledge base    Interventions:  - Provide teaching at level of understanding  - Provide teaching via preferred learning methods  Outcome: Progressing     Problem: RESPIRATORY - ADULT  Goal: Achieves optimal ventilation and oxygenation  Description: INTERVENTIONS:  - Assess for changes in respiratory status  - Assess for changes in mentation and behavior  - Position to facilitate oxygenation and minimize respiratory effort  - Oxygen administered by appropriate delivery if ordered  - Initiate smoking cessation education as indicated  - Encourage broncho-pulmonary hygiene including cough, deep breathe, Incentive Spirometry  - Assess the need for suctioning and aspirate as needed  - Assess and instruct to report SOB or any respiratory difficulty  - Respiratory Therapy support as indicated  Outcome: Progressing

## 2023-04-06 RX ORDER — MAGNESIUM SULFATE HEPTAHYDRATE 40 MG/ML
2 INJECTION, SOLUTION INTRAVENOUS ONCE
Status: COMPLETED | OUTPATIENT
Start: 2023-04-06 | End: 2023-04-06

## 2023-04-06 RX ORDER — POTASSIUM CHLORIDE 20 MEQ/1
40 TABLET, EXTENDED RELEASE ORAL ONCE
Status: COMPLETED | OUTPATIENT
Start: 2023-04-06 | End: 2023-04-06

## 2023-04-06 RX ORDER — FUROSEMIDE 10 MG/ML
40 INJECTION INTRAMUSCULAR; INTRAVENOUS 2 TIMES DAILY
Status: COMPLETED | OUTPATIENT
Start: 2023-04-06 | End: 2023-04-06

## 2023-04-06 RX ORDER — SPIRONOLACTONE 25 MG/1
12.5 TABLET ORAL DAILY
Status: DISCONTINUED | OUTPATIENT
Start: 2023-04-07 | End: 2023-04-07 | Stop reason: HOSPADM

## 2023-04-06 RX ADMIN — DOCUSATE SODIUM 100 MG: 100 CAPSULE, LIQUID FILLED ORAL at 08:51

## 2023-04-06 RX ADMIN — FUROSEMIDE 40 MG: 10 INJECTION, SOLUTION INTRAVENOUS at 16:52

## 2023-04-06 RX ADMIN — ENOXAPARIN SODIUM 40 MG: 100 INJECTION SUBCUTANEOUS at 08:51

## 2023-04-06 RX ADMIN — MAGNESIUM SULFATE HEPTAHYDRATE 2 G: 40 INJECTION, SOLUTION INTRAVENOUS at 16:37

## 2023-04-06 RX ADMIN — FUROSEMIDE 40 MG: 10 INJECTION, SOLUTION INTRAVENOUS at 09:14

## 2023-04-06 RX ADMIN — DOCUSATE SODIUM 100 MG: 100 CAPSULE, LIQUID FILLED ORAL at 16:55

## 2023-04-06 RX ADMIN — LORAZEPAM 0.5 MG: 0.5 TABLET ORAL at 23:07

## 2023-04-06 RX ADMIN — FOLIC ACID 1 MG: 1 TABLET ORAL at 08:51

## 2023-04-06 RX ADMIN — METOPROLOL SUCCINATE 50 MG: 50 TABLET, EXTENDED RELEASE ORAL at 08:51

## 2023-04-06 RX ADMIN — POTASSIUM CHLORIDE 40 MEQ: 1500 TABLET, EXTENDED RELEASE ORAL at 16:37

## 2023-04-06 RX ADMIN — THIAMINE HCL TAB 100 MG 100 MG: 100 TAB at 08:51

## 2023-04-06 RX ADMIN — MULTIPLE VITAMINS W/ MINERALS TAB 1 TABLET: TAB ORAL at 08:51

## 2023-04-06 NOTE — PLAN OF CARE
Problem: CARDIOVASCULAR - ADULT  Goal: Maintains optimal cardiac output and hemodynamic stability  Description: INTERVENTIONS:  - Monitor I/O, vital signs and rhythm  - Monitor for S/S and trends of decreased cardiac output  - Administer and titrate ordered vasoactive medications to optimize hemodynamic stability  - Assess quality of pulses, skin color and temperature  - Assess for signs of decreased coronary artery perfusion  - Instruct patient to report change in severity of symptoms  Outcome: Progressing  Goal: Absence of cardiac dysrhythmias or at baseline rhythm  Description: INTERVENTIONS:  - Continuous cardiac monitoring, vital signs, obtain 12 lead EKG if ordered  - Administer antiarrhythmic and heart rate control medications as ordered  - Monitor electrolytes and administer replacement therapy as ordered  Outcome: Progressing     Problem: RESPIRATORY - ADULT  Goal: Achieves optimal ventilation and oxygenation  Description: INTERVENTIONS:  - Assess for changes in respiratory status  - Assess for changes in mentation and behavior  - Position to facilitate oxygenation and minimize respiratory effort  - Oxygen administered by appropriate delivery if ordered  - Initiate smoking cessation education as indicated  - Encourage broncho-pulmonary hygiene including cough, deep breathe, Incentive Spirometry  - Assess the need for suctioning and aspirate as needed  - Assess and instruct to report SOB or any respiratory difficulty  - Respiratory Therapy support as indicated  Outcome: Progressing     Problem: SAFETY ADULT  Goal: Patient will remain free of falls  Description: INTERVENTIONS:  - Educate patient/family on patient safety including physical limitations  - Instruct patient to call for assistance with activity   - Consult OT/PT to assist with strengthening/mobility   - Keep Call bell within reach  - Keep bed low and locked with side rails adjusted as appropriate  - Keep care items and personal belongings within reach  - Initiate and maintain comfort rounds  - Make Fall Risk Sign visible to staff  - Offer Toileting every 2 Hours, in advance of need  - Initiate/Maintain  alarm  - Obtain necessary fall risk management equipment:   - Apply yellow socks and bracelet for high fall risk patients  - Consider moving patient to room near nurses station  Outcome: Progressing

## 2023-04-06 NOTE — CASE MANAGEMENT
Case Management Assessment & Discharge Planning Note    Patient name Jamshid Cade  Location hospitals [facility] 2 /[facility] 2 Julio Cesar Moore* MRN [id number]  : 1960 Date 2023       Current Admission Date: 2023  Current Admission Diagnosis:Acute on chronic systolic heart failure [facility])   Patient Active Problem List    Diagnosis Date Noted   • Acute on chronic systolic [facility] ([facility] ) 2023   • Tooth infection 2022   • COPD (chronic obstructive pulmonary disease) ([facility] ) 2022   • History of leukocytosis 2022   • Non-ischemic cardiomyopathy ([facility] ) 2022   • Mediastinal lymphadenopathy 2022   • Smokes cigarettes 2022   • Alcohol ingestion, 1-4 drinks per day 2022   • Open wound of right hip 2020   • Chronic hip pain after total replacement of right hip joint 2020      LOS (days): 2  Geometric Mean LOS (GMLOS) (days):   Days to GMLOS:     OBJECTIVE:    Risk of Unplanned Readmission Score: 10 11         Current admission status: Inpatient       Preferred Pharmacy:   [address] [place] [id number]  Phone: [phone] Fax: [phone]    Primary Care Provider: No primary care provider on file      Primary Insurance: Oskar PEDERSEN  Secondary Insurance:     ASSESSMENT:  [facility], [address] Representative - Daughter   Primary Phone: [phone] (Mobile)               Advance Directives  Does patient have a [address] [place] [address] Avenue?: No  Was patient offered paperwork?: Yes (provided 5 wishes pamphlet)  Does patient have Advance Directives?: No  Was patient offered paperwork?: Yes (provided 5 wishes pamphlet)  Primary Contact: ANUSHKA KIM (Daughter)   [phone]         Readmission Root Cause  30 Day Readmission: No    Patient Information  Admitted from[de-identified] Home  Mental Status: Alert  During Assessment patient was accompanied by: Not accompanied during assessment  Assessment information provided by[de-identified] Patient  Primary Caregiver: Self  Support Systems: Spouse/significant other, Daughter  [place] of Residence: [facility] do you live in?: [facility] entry access options   Select all that apply : Stairs  Number of steps to enter home : 2  Do the steps have railings?: Yes  Type of Current Residence: 2 story home  Upon entering residence, is there a bedroom on the main floor (no further steps)?: No  A bedroom is located on the following floor levels of residence (select all that apply):: 2nd Floor  Upon entering residence, is there a bathroom on the main floor (no further steps)?: No  Indicate which floors of current residence have a bathroom (select all the apply):: 2nd Floor  In the last 12 months, was there a time when you were not able to pay the mortgage or rent on time?: No  In the last 12 months, how many places have you lived?: 1  In the last 12 months, was there a time when you did not have a steady place to sleep or slept in a shelter (including now)?: No  Homeless/housing insecurity resource given?: N/A  Living Arrangements: Lives w/ Spouse/significant other    Activities of Daily Living Prior to Admission  Functional Status: Independent  Completes ADLs independently?: Yes  Ambulates independently?: Yes  Does patient use assisted devices?: No  Does patient currently own DME?: Yes  What DME does the patient currently own?: Maynor Alejo  Does patient have a history of Outpatient Therapy (PT/OT)?: Yes ([facility])  Does the patient have a history of Short-Term Rehab?: No  Does patient have a history of HHC?: No  Does patient currently have [facility]?: No         Patient Information Continued  Income Source: Unemployed  Does patient have prescription coverage?: Yes  Within the past 12 months, you worried that your food would run out before you got the money to buy more : Never true  Within the past 12 months, the food you bought just didn't last and you didn't have money to get more : Never true  Food insecurity resource given?: N/A  Does patient receive dialysis treatments?: No  Does patient have a history of substance abuse? :  (per chart review alcohol & cigarrettes)  Does patient have a history of Mental Health Diagnosis?: No         Means of Transportation  Means of Transport to Appts[de-identified] Drives Self  In the past 12 months, has lack of transportation kept you from medical appointments or from getting medications?: No  In the past 12 months, has lack of transportation kept you from meetings, work, or from getting things needed for daily living?: No  Was application for public transport provided?: N/A        DISCHARGE DETAILS:    Discharge planning discussed with[de-identified] patient                           [address]         [place] the [place] [address] at discharge?: No    DME Referral Provided  Referral made for DME?: No    Other Referral/Resources/Interventions Provided:  Interventions: None Indicated         Treatment Team Recommendation: Home  Discharge Destination Plan[de-identified] Home  Transport at Discharge : Automobile (family desire  pt upon d/c)

## 2023-04-06 NOTE — PROGRESS NOTES
Progress Note - Cardiology   Nurys Hercules 58 y o  female MRN: [id number]  Unit/Bed#: [place] 2 -02 Encounter: [id number]    Assessment:  · Acute on chronic heart failure with reduced ejection fraction  · Pulmonary hypertension, group 2 in the setting of dilated cardiomyopathy +/- COPD   - TTE 4/5/23: LVEF 28%, global longitudinal strain reduced at -8% with an apical scar wall slight pattern, severe global hypokinesis with regional variation  Diastolic function severely abnormal consistent with ungraded restrictive relaxation  Severe right atrial dilatation  Mild to moderate MR, mild to moderate TR, PASP 49 mmHg  - TTE 11/28/22: LVEF 44%  - TTE 6/3/22: LVEF 28%  · Nonischemic dilated cardiomyopathy   - LHC 6/6/22: Normal epicardial coronary arteries  · Extensive history of tobacco abuse, quit in 2022  · Chronic daily alcohol use  · Chronic leukocytosis  · Mediastinal lymphadenopathy thought to be reactive in nature  · Thrombocytosis    Plan/ Discussion:  · Currently on furosemide 40 mg IV twice daily  We will restart outpatient spironolactone 12 5 mg daily  · If patient's renal function remains stable, blood pressure remains elevated, will plan to start ACE/ARB  If renal function declines, will plan to start hydralazine/isosorbide  Questionable withdrawal from her daily alcohol use  · Continue Toprol-XL 50 mg daily  · Echocardiogram as enumerated above reviewed with the patient; plan for outpatient cardiac MRI  · Monitor volume status closely with strict intake/output, daily weight  · Monitor renal function and electrolytes; maintain potassium > 4, magnesium > 2    · Most recent magnesium 1 7, will provide magnesium sulfate 2 g IVPB now  · Most recent potassium 3 6, will provide K-Dur 40 mEq x 1 now  Subjective:  Seen and examined at the bedside  Patient without shortness of breath or chest pain  She states she feels better today      Vitals:  Vitals:    04/05/23 1511 04/06/23 0544   Weight: 65 3 kg (144 lb) 62 8 kg (138 lb 7 2 oz)   ,  Vitals:    04/05/23 2225 04/06/23 0544 04/06/23 0731 04/06/23 0732   BP: 142/88  (!) 151/115 (!) 151/115   BP Location:       Pulse: 94  92 91   Resp: 19 16 16   Temp: 99 °F (37 2 °C)  98 3 °F (36 8 °C) 98 3 °F (36 8 °C)   TempSrc:    Oral   SpO2: 97%  91% 93%   Weight:  62 8 kg (138 lb 7 2 oz)     Height:           Exam:  General: Alert awake and oriented, no acute distress  Heart: Regular rate with controlled rhythm  Respiratory effort/ Lungs: Fine rales bilateral bases, otherwise clear  Abdominal: Non-tender to palpation, + bowel sounds, soft, no masses or distension  Lower Limbs:  No edema       Medications:    Current Facility-Administered Medications:   •  acetaminophen (TYLENOL) tablet 650 mg, 650 mg, Oral, Q4H PRN, Digna Blackman PA-C  •  aluminum-magnesium hydroxide-simethicone (MYLANTA) oral suspension 30 mL, 30 mL, Oral, Q6H PRN, Digna Blackman PANicC  •  docusate sodium (COLACE) capsule 100 mg, 100 mg, Oral, BID, SUZIE Mckenzie-C, 100 mg at 04/06/23 0851  •  enoxaparin (LOVENOX) subcutaneous injection 40 mg, 40 mg, Subcutaneous, Daily, Digna Blackman PA-C, 40 mg at 02/15/42 3765  •  folic acid (FOLVITE) tablet 1 mg, 1 mg, Oral, Daily, ALMA MckenzieC, 1 mg at 04/06/23 1920  •  furosemide (LASIX) injection 40 mg, 40 mg, Intravenous, BID, Digna Blackman PA-C, 40 mg at 04/06/23 1907  •  hydrALAZINE (APRESOLINE) injection 5 mg, 5 mg, Intravenous, Q6H PRN, Digna Blackman PA-C, 5 mg at 04/05/23 9529  •  LORazepam (ATIVAN) tablet 0 5 mg, 0 5 mg, Oral, Q8H PRN, Dinorah Darling DO  •  metoprolol succinate (TOPROL-XL) 24 hr tablet 50 mg, 50 mg, Oral, Daily, Digna Blackman PA-C, 50 mg at 04/06/23 0418  •  multivitamin-minerals (CENTRUM) tablet 1 tablet, 1 tablet, Oral, Daily, Digna Blackman PA-C, 1 tablet at 04/06/23 0851  •  ondansetron (ZOFRAN) injection 4 mg, 4 mg, Intravenous, Q6H PRN, Digna Blackman PA-C  •  thiamine tablet 100 mg, 100 mg, Oral, Daily, Digna Blackman PA-C, 100 mg at 04/06/23 0851      Labs/Data:        Results from last 7 days   Lab Units 04/05/23  0636 04/04/23  2245 04/04/23  1840   WBC Thousand/uL 11 42*  --  11 07*   HEMOGLOBIN g/dL 12 9  --  12 9   HEMATOCRIT % 38 9  --  39 7   PLATELETS Thousands/uL 533* 564* 528*     Results from last 7 days   Lab Units 04/05/23  0636 04/04/23  1840   POTASSIUM mmol/L 3 6 4 5   CHLORIDE mmol/L 103 107   CO2 mmol/L 25 22   BUN mg/dL 22 19   CREATININE mg/dL 1 42* 1 28

## 2023-04-06 NOTE — PROGRESS NOTES
[address] [place] [address]  Progress Note  Name: Rich Sanchez  MRN: [id number]  Unit/Bed#: [place] [address] [place] [address] 212-02 I Date of Admission: 2023   Date of Service: 2023 I Hospital Day: 2    Assessment/Plan   * Acute on chronic systolic heart failure [facility])  Assessment & Plan  Appreciate cards help    Diuresing with IV lasix  She is doing much better  Likely home soon    COPD (chronic obstructive pulmonary disease) ([facility] )  Assessment & Plan  Not an acute exacerbation    Alcohol ingestion, 1-4 drinks per day  Assessment & Plan  Last drink 1 day prior, drinks every evening  CIWA, thiamine, folic acid  Add prn ativan                 Subjective:   Feels better   Less sob  Less dyspnea on exertion      Objective:     Vitals:   Temp (24hrs), Av 6 °F (37 °C), Min:98 3 °F (36 8 °C), Max:99 °F (37 2 °C)    Temp:  [98 3 °F (36 8 °C)-99 °F (37 2 °C)] 98 3 °F (36 8 °C)  HR:  [89-94] 91  Resp:  [16-19] 16  BP: (140-151)/() 151/115  SpO2:  [86 %-97 %] 93 %  Body mass index is 25 32 kg/m²  Input and Output Summary (last 24 hours): Intake/Output Summary (Last 24 hours) at 2023 1439  Last data filed at 2023 1301  Gross per 24 hour   Intake 240 ml   Output 1950 ml   Net -1710 ml       Physical Exam:     Physical Exam  Vitals and nursing note reviewed  HENT:      Head: Normocephalic and atraumatic  Eyes:      Pupils: Pupils are equal, round, and reactive to light  Cardiovascular:      Rate and Rhythm: Normal rate and regular rhythm  Heart sounds: No murmur heard  No friction rub  No gallop  Pulmonary:      Effort: Pulmonary effort is normal       Breath sounds: Normal breath sounds  No wheezing or rales  Abdominal:      General: Bowel sounds are normal       Palpations: Abdomen is soft  Tenderness: There is no abdominal tenderness  Musculoskeletal:      Right lower leg: No edema  Left lower leg: No edema                 Additional Data: Labs:    Results from last 7 days   Lab Units 04/05/23  0636 04/04/23  2245 04/04/23  1840   WBC Thousand/uL 11 42*  --  11 07*   HEMOGLOBIN g/dL 12 9  --  12 9   HEMATOCRIT % 38 9  --  39 7   PLATELETS Thousands/uL 533*   < > 528*   NEUTROS PCT %  --   --  70   LYMPHS PCT %  --   --  17   MONOS PCT %  --   --  10   EOS PCT %  --   --  2    < > = values in this interval not displayed       Results from last 7 days   Lab Units 04/05/23  0636 04/04/23  1840   POTASSIUM mmol/L 3 6 4 5   CHLORIDE mmol/L 103 107   CO2 mmol/L 25 22   BUN mg/dL 22 19   CREATININE mg/dL 1 42* 1 28   CALCIUM mg/dL 9 1 9 3   ALK PHOS U/L  --  126*   ALT U/L  --  18   AST U/L  --  33                       * I Have Reviewed All Lab Data     Recent Cultures (last 7 days):             Last 24 Hours Medication List:   Current Facility-Administered Medications   Medication Dose Route Frequency Provider Last Rate   • acetaminophen  650 mg Oral Q4H PRN Clara Le PA-C     • aluminum-magnesium hydroxide-simethicone  30 mL Oral Q6H PRN Clara Le PA-C     • docusate sodium  100 mg Oral BID Clara Le PA-C     • enoxaparin  40 mg Subcutaneous Daily Clara Le PA-C     • folic acid  1 mg Oral Daily Clara Le PA-C     • furosemide  40 mg Intravenous BID Clara Le PA-C     • hydrALAZINE  5 mg Intravenous Q6H PRN Clara Le PA-C     • LORazepam  0 5 mg Oral Q8H PRN Key Darling, DO     • metoprolol succinate  50 mg Oral Daily Clara Le PA-C     • multivitamin-minerals  1 tablet Oral Daily Clara Le PA-C     • ondansetron  4 mg Intravenous Q6H PRN Clara Le PA-C     • thiamine  100 mg Oral Daily Clara Le PA-C           VTE Pharmacologic Prophylaxis:   Pharmacologic: Enoxaparin (Lovenox)      Current Length of Stay: 2 day(s)    Current Patient Status: Inpatient       Discharge Plan: home likely tomorrow    Code Status: Level 1 - Full Code           Today, Patient Was Seen By: Elina Whitlock DO    ** Please Note: Dictation voice to text software may have been used in the creation of this document   **

## 2023-04-06 NOTE — PLAN OF CARE
Problem: Potential for Falls  Goal: Patient will remain free of falls  Description: INTERVENTIONS:  - Educate patient/family on patient safety including physical limitations  - Instruct patient to call for assistance with activity   - Consult OT/PT to assist with strengthening/mobility   - Keep Call bell within reach  - Keep bed low and locked with side rails adjusted as appropriate  - Keep care items and personal belongings within reach  - Initiate and maintain comfort rounds  - Make Fall Risk Sign visible to staff  - Offer Toileting every 2 Hours, in advance of need  - Initiate/Maintain  alarm  - Obtain necessary fall risk management equipment:   - Apply yellow socks and bracelet for high fall risk patients  - Consider moving patient to room near nurses station  Outcome: Progressing     Problem: SAFETY ADULT  Goal: Patient will remain free of falls  Description: INTERVENTIONS:  - Educate patient/family on patient safety including physical limitations  - Instruct patient to call for assistance with activity   - Consult OT/PT to assist with strengthening/mobility   - Keep Call bell within reach  - Keep bed low and locked with side rails adjusted as appropriate  - Keep care items and personal belongings within reach  - Initiate and maintain comfort rounds  - Make Fall Risk Sign visible to staff  - Offer Toileting every 2 Hours, in advance of need  - Initiate/Maintain  alarm  - Obtain necessary fall risk management equipment:   - Apply yellow socks and bracelet for high fall risk patients  - Consider moving patient to room near nurses station  Outcome: Progressing

## 2023-04-07 VITALS
DIASTOLIC BLOOD PRESSURE: 95 MMHG | RESPIRATION RATE: 15 BRPM | SYSTOLIC BLOOD PRESSURE: 135 MMHG | OXYGEN SATURATION: 93 % | BODY MASS INDEX: 25.48 KG/M2 | WEIGHT: 138.45 LBS | TEMPERATURE: 99.1 F | HEIGHT: 62 IN | HEART RATE: 89 BPM

## 2023-04-07 LAB
ANION GAP SERPL CALCULATED.3IONS-SCNC: 9 MMOL/L (ref 4–13)
BUN SERPL-MCNC: 36 MG/DL (ref 5–25)
CALCIUM SERPL-MCNC: 9.7 MG/DL (ref 8.4–10.2)
CHLORIDE SERPL-SCNC: 97 MMOL/L (ref 96–108)
CO2 SERPL-SCNC: 27 MMOL/L (ref 21–32)
CREAT SERPL-MCNC: 1.66 MG/DL (ref 0.6–1.3)
GFR SERPL CREATININE-BSD FRML MDRD: 32 ML/MIN/1.73SQ M
GLUCOSE SERPL-MCNC: 85 MG/DL (ref 65–140)
POTASSIUM SERPL-SCNC: 4.5 MMOL/L (ref 3.5–5.3)
SODIUM SERPL-SCNC: 133 MMOL/L (ref 135–147)

## 2023-04-07 RX ORDER — LANOLIN ALCOHOL/MO/W.PET/CERES
100 CREAM (GRAM) TOPICAL DAILY
Qty: 30 TABLET | Refills: 0 | Status: SHIPPED | OUTPATIENT
Start: 2023-04-08

## 2023-04-07 RX ORDER — METOPROLOL SUCCINATE 50 MG/1
50 TABLET, EXTENDED RELEASE ORAL DAILY
Qty: 30 TABLET | Refills: 0 | Status: SHIPPED | OUTPATIENT
Start: 2023-04-08

## 2023-04-07 RX ORDER — DOCUSATE SODIUM 100 MG/1
100 CAPSULE, LIQUID FILLED ORAL 2 TIMES DAILY
Qty: 60 CAPSULE | Refills: 0 | Status: SHIPPED | OUTPATIENT
Start: 2023-04-07

## 2023-04-07 RX ORDER — FOLIC ACID 1 MG/1
1 TABLET ORAL DAILY
Qty: 30 TABLET | Refills: 0 | Status: SHIPPED | OUTPATIENT
Start: 2023-04-08

## 2023-04-07 RX ADMIN — SPIRONOLACTONE 12.5 MG: 25 TABLET, FILM COATED ORAL at 09:04

## 2023-04-07 RX ADMIN — ENOXAPARIN SODIUM 40 MG: 100 INJECTION SUBCUTANEOUS at 09:04

## 2023-04-07 RX ADMIN — FOLIC ACID 1 MG: 1 TABLET ORAL at 09:04

## 2023-04-07 RX ADMIN — METOPROLOL SUCCINATE 50 MG: 50 TABLET, EXTENDED RELEASE ORAL at 09:04

## 2023-04-07 RX ADMIN — THIAMINE HCL TAB 100 MG 100 MG: 100 TAB at 09:04

## 2023-04-07 RX ADMIN — DOCUSATE SODIUM 100 MG: 100 CAPSULE, LIQUID FILLED ORAL at 09:04

## 2023-04-07 RX ADMIN — MULTIPLE VITAMINS W/ MINERALS TAB 1 TABLET: TAB ORAL at 09:04

## 2023-04-07 NOTE — CASE MANAGEMENT
Case Management Discharge Planning Note    Patient name Sunil Mahoney  Location [facility] 2 Jacqui Delcid* MRN [id number]  : 1960 Date 2023       Current Admission Date: 2023  Current Admission Diagnosis:Acute on chronic systolic heart failure [facility])   Patient Active Problem List    Diagnosis Date Noted   • Acute on chronic systolic heart failure ([facility] ) 2023   • Tooth infection 2022   • COPD (chronic obstructive pulmonary disease) ([facility] ) 2022   • History of leukocytosis 2022   • Non-ischemic cardiomyopathy ([place] [address] ) 2022   • Mediastinal lymphadenopathy 2022   • Smokes cigarettes 2022   • Alcohol ingestion, 1-4 drinks per day 2022   • Open wound of right hip 2020   • Chronic hip pain after total replacement of right hip joint 2020      LOS (days): 3  Geometric Mean LOS (GMLOS) (days): 3 00  Days to GMLOS:0 3     OBJECTIVE:  Risk of Unplanned Readmission Score: 10 27         Current admission status: Inpatient   Preferred Pharmacy:   [address] [place] [id number]  Phone: [phone] Fax: [phone]    Primary Care Provider: No primary care provider on file      Primary Insurance: Shawn PEDERSEN  Secondary Insurance:     DISCHARGE DETAILS:    Discharge planning discussed with[de-identified] patient           Were Treatment Team discharge recommendations reviewed with patient/caregiver?: Yes  Did patient/caregiver verbalize understanding of patient care needs?: Yes       Contacts  Reason/Outcome: Discharge [address]         Is the patient interested in [address] at discharge?: No    DME Referral Provided  Referral made for DME?: No    Other Referral/Resources/Interventions Provided:  Financial Resources Provided: Other (Comment) (Requesting OPCM/SW for poor compliance with her cardiac medications)  Interventions: None Indicated, Outpatient Case Manager  Referral Comments: After discussion w/ pt > chart email sent to NICK Tiwari requesting OPCM/SW for poor compliance with her cardiac medications            Discharge Destination Plan[de-identified] Home  Transport at Discharge : Automobile (family desire  pt upon d/c)

## 2023-04-07 NOTE — PROGRESS NOTES
[address] [place] [address]  Progress Note  Name: Ashtyn Robbins  MRN: [id number]  Unit/Bed#: [place] [address] 212-02 I Date of Admission: 4/4/2023   Date of Service: 4/7/2023 I Hospital Day: 3    Assessment/Plan   * Acute on chronic systolic heart failure [facility])  Assessment & Plan  Appreciate cards help  LVEF 28 %    We diuresed her with IV Lasix and she is feeling well  She has much less dyspnea on exertion    Fortunately she does have elevation in her creatinine now    She also wants to leave because she feels better  I gave her the option of staying in the hospital and having us work on getting her kidney function better before she goes home, or she could take the risk of going home over the weekend which I do not think is that bigger risk and follow-up with cardiology on Monday to get some blood work  She prefers to go home and follow-up with cardiology on Monday  We are not going to send her out on any diuretics or ACE inhibitor right now due to the elevated creatinine  But these could be started once her kidney function is back to baseline as an outpatient  Patient understands she should return to the emergency room immediately if she has anything like shortness of breath, chest pain, dyspnea on exertion or increased lower extremity edema    Alcohol ingestion, 1-4 drinks per day  Assessment & Plan  Last drink 1 day prior, drinks every evening  CIWA, thiamine, folic acid  Discharging Physician / Practitioner: Uyen Anglin DO  PCP: No primary care provider on file    Admission Date:   Admission Orders (From admission, onward)     Ordered        04/04/23 2044  INPATIENT ADMISSION  Once                      Discharge Date: 04/07/23    Medical Problems     Resolved Problems  Date Reviewed: 4/4/2023   None           Consultations During Hospital Stay:  · Cardiology      Procedures Performed:     · Echocardiogram      Reason for Admission: Dyspnea on exertion      Hospital Course: "    Abner Rader is a 58 y o  female patient who originally presented to the hospital on 4/4/2023 due to dyspnea on exertion  This was felt to be secondary to acute decompensated systolic CHF  Echocardiogram was done  LVEF is 28%  She was seen by cardiology  She was diuresed with IV Lasix and Aldactone  Unfortunately her creatinine has risen  But she is feeling better  She is wanting to go home  We did tell her she could stay in the hospital to get her kidney function better but she would prefer to go home as she is feeling well  We will hold her diuretics and not give her an ACE inhibitor at discharge due to elevated creatinine  She will follow-up with cardiology in the office next week for BMP  If improving, we may be able to start diuretics and an ACE inhibitor at that time  Please see above list of diagnoses and related plan for additional information  Condition at Discharge: stable       Discharge Day Visit / Exam:     Subjective:  Feels well  No dyspnea  Wants to go home      Vitals: Blood Pressure: 135/95 (04/07/23 1532)  Pulse: 89 (04/07/23 1532)  Temperature: 99 1 °F (37 3 °C) (04/07/23 1532)  Temp Source: Oral (04/06/23 1531)  Respirations: 15 (04/07/23 1532)  Height: 5' 2\" (157 5 cm) (04/05/23 1511)  Weight - Scale: 62 8 kg (138 lb 7 2 oz) (04/07/23 0600)  SpO2: 93 % (04/07/23 1532)    Exam:     Physical Exam  Vitals and nursing note reviewed  HENT:      Head: Normocephalic and atraumatic  Eyes:      Pupils: Pupils are equal, round, and reactive to light  Cardiovascular:      Rate and Rhythm: Normal rate and regular rhythm  Heart sounds: No murmur heard  No friction rub  No gallop  Pulmonary:      Effort: Pulmonary effort is normal       Breath sounds: Normal breath sounds  No wheezing or rales  Abdominal:      General: Bowel sounds are normal       Palpations: Abdomen is soft  Tenderness: There is no abdominal tenderness     Musculoskeletal:      Right " lower leg: No edema  Left lower leg: No edema            Discharge instructions/Information to patient and family:   See after visit summary for information provided to patient and family  Provisions for Follow-Up Care:  See after visit summary for information related to follow-up care and any pertinent home health orders  Disposition:     Home       Discharge Statement:  I spent 39 minutes discharging the patient  This time was spent on the day of discharge  I had direct contact with the patient on the day of discharge  Greater than 50% of the total time was spent examining patient, answering all patient questions, arranging and discussing plan of care with patient as well as directly providing post-discharge instructions  Additional time then spent on discharge activities  Discharge Medications:  See after visit summary for reconciled discharge medications provided to patient and family        ** Please Note: This note has been constructed using a voice recognition system **

## 2023-04-07 NOTE — PLAN OF CARE
Problem: Potential for Falls  Goal: Patient will remain free of falls  Description: INTERVENTIONS:  - Educate patient/family on patient safety including physical limitations  - Instruct patient to call for assistance with activity   - Consult OT/PT to assist with strengthening/mobility   - Keep Call bell within reach  - Keep bed low and locked with side rails adjusted as appropriate  - Keep care items and personal belongings within reach  - Initiate and maintain comfort rounds  - Make Fall Risk Sign visible to staff  - Offer Toileting every 2 Hours, in advance of need  - Initiate/Maintain  alarm  - Obtain necessary fall risk management equipment:   - Apply yellow socks and bracelet for high fall risk patients  - Consider moving patient to room near nurses station  Outcome: Progressing     Problem: PAIN - ADULT  Goal: Verbalizes/displays adequate comfort level or baseline comfort level  Description: Interventions:  - Encourage patient to monitor pain and request assistance  - Assess pain using appropriate pain scale  - Administer analgesics based on type and severity of pain and evaluate response  - Implement non-pharmacological measures as appropriate and evaluate response  - Consider cultural and social influences on pain and pain management  - Notify physician/advanced practitioner if interventions unsuccessful or patient reports new pain  Outcome: Progressing     Problem: SAFETY ADULT  Goal: Patient will remain free of falls  Description: INTERVENTIONS:  - Educate patient/family on patient safety including physical limitations  - Instruct patient to call for assistance with activity   - Consult OT/PT to assist with strengthening/mobility   - Keep Call bell within reach  - Keep bed low and locked with side rails adjusted as appropriate  - Keep care items and personal belongings within reach  - Initiate and maintain comfort rounds  - Make Fall Risk Sign visible to staff  - Offer Toileting every 2 Hours, in advance of need  - Initiate/Maintain  alarm  - Obtain necessary fall risk management equipment:   - Apply yellow socks and bracelet for high fall risk patients  - Consider moving patient to room near nurses station  Outcome: Progressing     Problem: DISCHARGE PLANNING  Goal: Discharge to home or other facility with appropriate resources  Description: INTERVENTIONS:  - Identify barriers to discharge w/patient and caregiver  - Arrange for needed discharge resources and transportation as appropriate  - Identify discharge learning needs (meds, wound care, etc )  - Arrange for interpretive services to assist at discharge as needed  - Refer to Case Management Department for coordinating discharge planning if the patient needs post-hospital services based on physician/advanced practitioner order or complex needs related to functional status, cognitive ability, or social support system  Outcome: Progressing     Problem: Knowledge Deficit  Goal: Patient/family/caregiver demonstrates understanding of disease process, treatment plan, medications, and discharge instructions  Description: Complete learning assessment and assess knowledge base    Interventions:  - Provide teaching at level of understanding  - Provide teaching via preferred learning methods  Outcome: Progressing     Problem: RESPIRATORY - ADULT  Goal: Achieves optimal ventilation and oxygenation  Description: INTERVENTIONS:  - Assess for changes in respiratory status  - Assess for changes in mentation and behavior  - Position to facilitate oxygenation and minimize respiratory effort  - Oxygen administered by appropriate delivery if ordered  - Initiate smoking cessation education as indicated  - Encourage broncho-pulmonary hygiene including cough, deep breathe, Incentive Spirometry  - Assess the need for suctioning and aspirate as needed  - Assess and instruct to report SOB or any respiratory difficulty  - Respiratory Therapy support as indicated  Outcome: Progressing     Problem: CARDIOVASCULAR - ADULT  Goal: Maintains optimal cardiac output and hemodynamic stability  Description: INTERVENTIONS:  - Monitor I/O, vital signs and rhythm  - Monitor for S/S and trends of decreased cardiac output  - Administer and titrate ordered vasoactive medications to optimize hemodynamic stability  - Assess quality of pulses, skin color and temperature  - Assess for signs of decreased coronary artery perfusion  - Instruct patient to report change in severity of symptoms  Outcome: Progressing  Goal: Absence of cardiac dysrhythmias or at baseline rhythm  Description: INTERVENTIONS:  - Continuous cardiac monitoring, vital signs, obtain 12 lead EKG if ordered  - Administer antiarrhythmic and heart rate control medications as ordered  - Monitor electrolytes and administer replacement therapy as ordered  Outcome: Progressing

## 2023-04-07 NOTE — PROGRESS NOTES
Progress Note - Cardiology   Abisai Snow 58 y o  female MRN: [id number]  Unit/Bed#: [facility] -02 Encounter: [id number]    Assessment:  • Acute on chronic heart failure with reduced ejection fraction  • Pulmonary hypertension, group 2 in the setting of dilated cardiomyopathy +/- COPD              - TTE 4/5/23: LVEF 28%, global longitudinal strain reduced at -8% with an apical scar wall slight pattern, severe global hypokinesis with regional variation  Diastolic function severely abnormal consistent with ungraded restrictive relaxation  Severe right atrial dilatation  Mild to moderate MR, mild to moderate TR, PASP 49 mmHg  - TTE 11/28/22: LVEF 44%  - TTE 6/3/22: LVEF 28%  • Nonischemic dilated cardiomyopathy              - LHC 6/6/22: Normal epicardial coronary arteries  • Extensive history of tobacco abuse, quit in 2022  • Chronic daily alcohol use  • Chronic leukocytosis  • Mediastinal lymphadenopathy thought to be reactive in nature  • Thrombocytosis    Plan/ Discussion:  • Status post furosemide 40 mg IV twice daily  • Currently on spironolactone 12 5 mg daily  Will hold for now given bump in creatinine  • Will begin oral diuretics upon improvement in renal status  Patient with repeat lab studies to be completed on Monday  She is agreeable  • Continue Toprol-XL 50 mg daily  • Plan for outpatient cardiac MRI  • Monitor volume status closely with strict intake/output, daily weight  • Monitor renal function and electrolytes; maintain potassium > 4, magnesium > 2  Subjective:  Patient seen and evaluated at the bedside  Patient is eager to be discharged  Patient is without shortness of breath or chest pain      Vitals:  Vitals:    04/06/23 0544 04/07/23 0600   Weight: 62 8 kg (138 lb 7 2 oz) 62 8 kg (138 lb 7 2 oz)   ,  Vitals:    04/06/23 1531 04/06/23 2226 04/07/23 0600 04/07/23 0714   BP: (!) 146/102 139/99  135/97   BP Location: Right arm      Pulse: 83 96  87   Resp: 18 22  16   Temp: 98 °F (36 7 °C) 98 6 °F (37 °C)  98 1 °F (36 7 °C)   TempSrc: Oral      SpO2: 91% 93%  91%   Weight:   62 8 kg (138 lb 7 2 oz)    Height:           Exam:  General: Alert awake and oriented, no acute distress  Heart: Regular rate with controlled rhythm  Respiratory effort/ Lungs:  Breathing comfortably on room air, clear bilaterally without wheezing, rales, crackles   Abdominal: Non-tender to palpation, + bowel sounds, soft, no masses or distension  Lower Limbs:  No edema    Medications:    Current Facility-Administered Medications:   •  acetaminophen (TYLENOL) tablet 650 mg, 650 mg, Oral, Q4H PRN, Lonny Ariza PA-C  •  aluminum-magnesium hydroxide-simethicone (MYLANTA) oral suspension 30 mL, 30 mL, Oral, Q6H PRN, Lonny Ariza PA-C  •  docusate sodium (COLACE) capsule 100 mg, 100 mg, Oral, BID, Lonny Ariza PA-C, 100 mg at 04/07/23 8212  •  enoxaparin (LOVENOX) subcutaneous injection 40 mg, 40 mg, Subcutaneous, Daily, Lonny Ariza PA-C, 40 mg at 41/25/30 0381  •  folic acid (FOLVITE) tablet 1 mg, 1 mg, Oral, Daily, Lonny Ariza PA-C, 1 mg at 04/07/23 0393  •  hydrALAZINE (APRESOLINE) injection 5 mg, 5 mg, Intravenous, Q6H PRN, Lonny Ariza PA-C, 5 mg at 04/05/23 8178  •  LORazepam (ATIVAN) tablet 0 5 mg, 0 5 mg, Oral, Q8H PRN, Demetrius Darling, DO, 0 5 mg at 04/06/23 2307  •  metoprolol succinate (TOPROL-XL) 24 hr tablet 50 mg, 50 mg, Oral, Daily, Lonny Ariza PA-C, 50 mg at 04/07/23 0225  •  multivitamin-minerals (CENTRUM) tablet 1 tablet, 1 tablet, Oral, Daily, Lonny Ariza PA-C, 1 tablet at 04/07/23 0904  •  ondansetron (ZOFRAN) injection 4 mg, 4 mg, Intravenous, Q6H PRN, Lonny Ariza PA-C  •  spironolactone (ALDACTONE) tablet 12 5 mg, 12 5 mg, Oral, Daily, CARMEN Harkins, 12 5 mg at 04/07/23 9607  •  thiamine tablet 100 mg, 100 mg, Oral, Daily, Lonny Ariza PA-C, 100 mg at 04/07/23 0870      Labs/Data:        Results from last 7 days   Lab Units 04/05/23  0636 04/04/23  3529 04/04/23  1840   WBC Thousand/uL 11 42*  --  11 07*   HEMOGLOBIN g/dL 12 9  --  12 9   HEMATOCRIT % 38 9  --  39 7   PLATELETS Thousands/uL 533* 564* 528*     Results from last 7 days   Lab Units 04/05/23  0636 04/04/23  1840   POTASSIUM mmol/L 3 6 4 5   CHLORIDE mmol/L 103 107   CO2 mmol/L 25 22   BUN mg/dL 22 19   CREATININE mg/dL 1 42* 1 28

## 2023-04-07 NOTE — CASE MANAGEMENT
Case Management Discharge Planning Note    Patient name Maribel Barrera  Location [place] 2 /[facility] 2 Lawanda Hoyos* MRN [id number]  : 1960 Date 2023       Current Admission Date: 2023  Current Admission Diagnosis:Acute on chronic systolic heart failure [facility])   Patient Active Problem List    Diagnosis Date Noted   • Acute on chronic systolic heart failure ([facility] ) 2023   • Tooth infection 2022   • COPD (chronic obstructive pulmonary disease) ([place] [address] ) 2022   • History of leukocytosis 2022   • Non-ischemic cardiomyopathy ([place] [address] ) 2022   • Mediastinal lymphadenopathy 2022   • Smokes cigarettes 2022   • Alcohol ingestion, 1-4 drinks per day 2022   • Open wound of right hip 2020   • Chronic hip pain after total replacement of right hip joint 2020      LOS (days): 3  Geometric Mean LOS (GMLOS) (days): 3 00  Days to GMLOS:0 2     OBJECTIVE:  Risk of Unplanned Readmission Score: 10 27         Current admission status: Inpatient   Preferred Pharmacy:   [address] [place] [address]  Phone: [phone] Fax: [phone]    Primary Care Provider: No primary care provider on file  Primary Insurance: Augustin PEDERSEN  Secondary Insurance:     DISCHARGE DETAILS:         Additional Comments: Chart email sent to Pusher NIKKI as a new pt & TCM & need for OP CM/SW

## 2023-04-07 NOTE — DISCHARGE SUMMARY
* Acute on chronic systolic heart failure (HCC)  Assessment & Plan  Appreciate cards help  LVEF 28 %     We diuresed her with IV Lasix and she is feeling well  She has much less dyspnea on exertion     Fortunately she does have elevation in her creatinine now     She also wants to leave because she feels better  I gave her the option of staying in the hospital and having us work on getting her kidney function better before she goes home, or she could take the risk of going home over the weekend which I do not think is that bigger risk and follow-up with cardiology on Monday to get some blood work  She prefers to go home and follow-up with cardiology on Monday  We are not going to send her out on any diuretics or ACE inhibitor right now due to the elevated creatinine  But these could be started once her kidney function is back to baseline as an outpatient  Patient understands she should return to the emergency room immediately if she has anything like shortness of breath, chest pain, dyspnea on exertion or increased lower extremity edema     Alcohol ingestion, 1-4 drinks per day  Assessment & Plan  Last drink 1 day prior, drinks every evening  CIWA, thiamine, folic acid                      Discharging Physician / Practitioner: Sarah Morales DO  PCP: No primary care provider on file  Admission Date:       Admission Orders (From admission, onward)       Ordered         04/04/23 2044   INPATIENT ADMISSION  Once                         Discharge Date: 04/07/23         Medical Problems      Resolved Problems  Date Reviewed: 4/4/2023   None               Consultations During Hospital Stay:  • Cardiology        Procedures Performed:      • Echocardiogram        Reason for Admission: Dyspnea on exertion        Hospital Course:      Tai Jeff is a 58 y o  female patient who originally presented to the hospital on 4/4/2023 due to dyspnea on exertion    This was felt to be secondary to acute decompensated "systolic CHF  Echocardiogram was done  LVEF is 28%  She was seen by cardiology  She was diuresed with IV Lasix and Aldactone  Unfortunately her creatinine has risen  But she is feeling better  She is wanting to go home  We did tell her she could stay in the hospital to get her kidney function better but she would prefer to go home as she is feeling well  We will hold her diuretics and not give her an ACE inhibitor at discharge due to elevated creatinine  She will follow-up with cardiology in the office next week for BMP  If improving, we may be able to start diuretics and an ACE inhibitor at that time      Please see above list of diagnoses and related plan for additional information          Condition at Discharge: stable         Discharge Day Visit / Exam:      Subjective:  Feels well  No dyspnea  Wants to go home        Vitals: Blood Pressure: 135/95 (04/07/23 1532)  Pulse: 89 (04/07/23 1532)  Temperature: 99 1 °F (37 3 °C) (04/07/23 1532)  Temp Source: Oral (04/06/23 1531)  Respirations: 15 (04/07/23 1532)  Height: 5' 2\" (157 5 cm) (04/05/23 1511)  Weight - Scale: 62 8 kg (138 lb 7 2 oz) (04/07/23 0600)  SpO2: 93 % (04/07/23 1532)     Exam:      Physical Exam  Vitals and nursing note reviewed  HENT:      Head: Normocephalic and atraumatic  Eyes:      Pupils: Pupils are equal, round, and reactive to light  Cardiovascular:      Rate and Rhythm: Normal rate and regular rhythm  Heart sounds: No murmur heard  No friction rub  No gallop  Pulmonary:      Effort: Pulmonary effort is normal       Breath sounds: Normal breath sounds  No wheezing or rales  Abdominal:      General: Bowel sounds are normal       Palpations: Abdomen is soft  Tenderness: There is no abdominal tenderness  Musculoskeletal:      Right lower leg: No edema        Left lower leg: No edema                      Discharge instructions/Information to patient and family:   See after visit summary for information " provided to patient and family        Provisions for Follow-Up Care:  See after visit summary for information related to follow-up care and any pertinent home health orders        Disposition:      Home        Discharge Statement:  I spent 39 minutes discharging the patient  This time was spent on the day of discharge  I had direct contact with the patient on the day of discharge  Greater than 50% of the total time was spent examining patient, answering all patient questions, arranging and discussing plan of care with patient as well as directly providing post-discharge instructions    Additional time then spent on discharge activities      Discharge Medications:  See after visit summary for reconciled discharge medications provided to patient and family        ** Please Note: This note has been constructed using a voice recognition system **

## 2023-04-07 NOTE — ASSESSMENT & PLAN NOTE
Appreciate cards help  LVEF 28 %    We diuresed her with IV Lasix and she is feeling well  She has much less dyspnea on exertion    Fortunately she does have elevation in her creatinine now    She also wants to leave because she feels better  I gave her the option of staying in the hospital and having us work on getting her kidney function better before she goes home, or she could take the risk of going home over the weekend which I do not think is that bigger risk and follow-up with cardiology on Monday to get some blood work  She prefers to go home and follow-up with cardiology on Monday  We are not going to send her out on any diuretics or ACE inhibitor right now due to the elevated creatinine    But these could be started once her kidney function is back to baseline as an outpatient  Patient understands she should return to the emergency room immediately if she has anything like shortness of breath, chest pain, dyspnea on exertion or increased lower extremity edema

## 2023-04-10 NOTE — UTILIZATION REVIEW
NOTIFICATION OF ADMISSION DISCHARGE   This is a Notification of Discharge from [address]  Please be advised that this patient has been discharge from our facility  Below you will find the admission and discharge date and time including the patient’s disposition  UTILIZATION REVIEW CONTACT:  Lily Cochran MA  Utilization   Network Utilization Review Department  Phone: [phone] x carefully listen to the prompts  All voicemails are confidential   Email: [email or web address]     ADMISSION INFORMATION  PRESENTATION DATE: 4/4/2023  5:44 PM  OBERVATION ADMISSION DATE:   INPATIENT ADMISSION DATE: 4/4/23  8:44 PM   DISCHARGE DATE: 4/7/2023  5:49 PM   DISPOSITION:Home/Self Care    IMPORTANT INFORMATION:  Send all requests for admission clinical reviews, approved or denied determinations and any other requests to dedicated fax number below belonging to the campus where the patient is receiving treatment   List of dedicated fax numbers:  [address] DENIALS (Administrative/Medical Necessity) [phone]   [address] (Maternity/NICU/Pediatrics) [phone]   [facility] [phone]   [facility] 87 [phone]   [address] [phone]   220 [facility] [phone] 90 [facility] [phone]   [address] [place], [place] [address] [phone]   [facility]  [phone]   4050 [facility] [phone]   412 [facility] 850 [address] [facility] [phone]

## 2023-04-25 NOTE — PROGRESS NOTES
Advanced Heart Failure / Pulmonary Hypertension Outpatient Visit    Dorota Bashir 58 y o  female   MRN: [id number]  Encounter: [id number]    Assessment:  Patient Active Problem List    Diagnosis Date Noted   • Acute on chronic systolic heart failure ([place] [address] ) 04/04/2023   • Tooth infection 06/04/2022   • COPD (chronic obstructive pulmonary disease) ([place] [address] ) 06/04/2022   • History of leukocytosis 06/03/2022   • Non-ischemic cardiomyopathy ([place] [address] ) 06/03/2022   • Mediastinal lymphadenopathy 06/03/2022   • Smokes cigarettes 06/03/2022   • Alcohol ingestion, 1-4 drinks per day 06/03/2022   • Open wound of right hip 09/29/2020   • Chronic hip pain after total replacement of right hip joint 09/29/2020       Today's Plan:  • Warm and euvolemic on exam  Required short course of Lasix 20 mg QD for three days after last visit, reports that she felt so much better after taking this  • Restart Jardiance starting next week given recent episodes of volume overload  Was previously prescribed this  BMP 5-7 days after restarting  • Restarted spironolactone previously at low dose  Increase to 25 mg daily as of today  • Strain pattern on recent echo concerning for possible amyloid  AL screening labs sent and cardiac MRI ordered  Serum free light chains abnormal; referred to hematology  • 3 week follow up in office  Has follow up with cardiologist in [facility] arranged for June  • Repeat TTE 7/2023  • Emphasized lifestyle and dietary modifications  • Daily weights, 2g sodium restriction, 2L fluid restriction  Plan:  Chronic HFrEF; LVEF 28%; LVIDd 5 2cm; NYHA II; ACC/AHA Stage C   Etiology: nonischemic  Chronic daily ETOH use  ? amyloid  Planned for outpatient cardiac MRI per inpatient cardiology note  Weight of 138 lbs on 4/7/23  139 lbs on 4/13  Today, weighs 142 lbs  Most recent BMP from 4/14/23: Na 136, K 5 0, creatinine 1 36, BUN 29, eGFR 41  TTE 6/3/22: LVEF 28%  TTE 9/8/22: LVEF 35%  LA moderately dilated  Small patent foramen ovale noted atrial septum  IVC normal    TTE 11/28/22: LVEF 44%  Mild global hypokinesis  TTE 4/5/23: LVEF 28%, global longitudinal strain reduced at -8% with an apical sparing bulls-eye pattern, severe global hypokinesis with regional variation  Diastolic function severely abnormal consistent with ungraded restrictive relaxation   Severe biatrial dilatation  Patent foramen ovale atrial septum with left to right shunting  Mild to moderate MR, mild to moderate TR, PASP 49 mmHg  IVC dilated  LHC 6/6/22: Normal epicardial coronary arteries  No voltage to mass mismatch evident on EKG  Pharmacotherapies / Neurohormonal Blockade:  --Beta Blocker: metoprolol succinate 50 mg daily  --ARNi / ACEi / ARB: previously on losartan 25 mg daily  --Aldosterone Antagonist: Spironolactone 25 mg  --SGLT2 Inhibitor: Jardiance 10 mg daily (starting 5/4)  --Diuretic: none     Sudden Cardiac Death Risk Reduction:  --ICD: LVEF 28%  Previously deferred ICD placement per chart review  Again declined consideration for ICD  Repeat TTE in 3 months when GDMT optimized  Electrical Resynchronization:  --Candidacy for BiV device: narrow QRSd    Advanced Therapies: Will continue to monitor  --Inotrope:  --LVAD/Transplant Candidacy:    Pulmonary hypertension  Group II and possible group III with dilated cardiomyopathy and +/- COPD  Chronic daily alcohol use  Extensive history tobacco abuse  Quit in 2022  Chronic leukocytosis  Thrombocytosis    HPI:   Leonila Bowers is a 70-year-old female who presents for follow up  PMH includes nonischemic dilated cardiomyopathy, HFrEF with LVEF 28%, pulmonary hypertension, chronic alcohol use, chronic leukocytosis, tobacco use  She was recently admitted to [facility] with weight gain, shortness of breath with exertion, and orthopnea   She was reportedly not taking her HF medications at home, and weight was up approximately 20 lbs (dry weight ~135 lbs, weight on admission 155 lbs)  BNP on admission 2800  CXR with cardiomegaly, CTA PE study with no PE, interstitial edema, healing left lateral 6th rib fracture, and stable mild right paratracheal lymphadenopathy and enlarged subcarinal node since April 2023  She was diuresed with IV Lasix, and magnesium was repleted with IV mag  She developed a mild ADRYAN with creatinine 1 66 (baseline 0 8-1 2), and spironolactone and oral diuretics were held at discharge with plans to resume outpatient  Weight at discharge 138 lbs  Inpatient TTE concerning for ? amyloid; planned for outpatient cardiac MRI  4/13/23: presents today for follow up  Weights have been stable at home  No SOB, PND, orthopnea  No chest pain, palpitations, near syncope  No LE edema or abdominal distension  Intermittent compliance with medications previously; never took Jardiance when prescribed it, intermittent compliance with spironolactone, was not taking metoprolol prior to hospitalization  Started this after discharge  Hasn't been taking losartan since the autumn  Unsure how much she's drinking, thinks diet is adequate  Denies excess sodium consumption  Reports that she experienced SOB when she was volume overloaded, but denies any symptoms consistent with that since discharge  Reports that her mother had heart failure starting in her 45s, no known history of CAD  No family history sudden cardiac death  Recently quit smoking  Has been drinking approximately 2 drinks (rum and coke) per day for many years, denies alcohol use since discharge  4/27/23: presents today for follow up  Feeling great, no SOB/JACKSON  No chest pain, palpitations, dizziness  No LE edema, PND, orthopnea  Compliant with medications - started the spironolactone as directed after last visit  Felt much better after taking low dose Lasix x 3 days after last visit  Weights have been stable (138, 139 lbs)  Trying to stay away from salt, drinking 32-48 oz of liquid per day - mix of water and iced tea  Urinating well - urinated a lot when taking Lasix  Amenable to restarting/taking recommended GDMT going forward  Past Medical History:   Diagnosis Date   • CHF (congestive heart failure) (HCC)    • Hypertension      Review of Systems   Constitutional: Negative for chills, fever and unexpected weight change  HENT: Negative for ear pain and sore throat  Eyes: Negative for pain and visual disturbance  Respiratory: Negative for cough and shortness of breath  Cardiovascular: Negative for chest pain, palpitations and leg swelling  Gastrointestinal: Negative for abdominal pain and vomiting  Genitourinary: Negative for dysuria and hematuria  Musculoskeletal: Negative for arthralgias and back pain  Skin: Negative for color change and rash  Neurological: Negative for seizures and syncope  All other systems reviewed and are negative  14-point ROS completed and negative except as stated above and/or in the HPI        Allergies   Allergen Reactions   • Codeine GI Intolerance and Vomiting   • Oxycodone-Acetaminophen GI Intolerance and Vomiting   • Propoxyphene Vomiting       Current Outpatient Medications:   •  albuterol (PROVENTIL HFA,VENTOLIN HFA) 90 mcg/act inhaler, Inhale 1 puff every 6 (six) hours as needed, Disp: , Rfl:   •  [START ON 5/4/2023] Empagliflozin (JARDIANCE) 10 MG TABS tablet, Take 1 tablet (10 mg total) by mouth every morning Do not start before May 4, 2023 , Disp: 30 tablet, Rfl: 2  •  metoprolol succinate (TOPROL-XL) 50 mg 24 hr tablet, Take 1 tablet (50 mg total) by mouth daily Do not start before April 8, 2023 , Disp: 30 tablet, Rfl: 0  •  spironolactone (ALDACTONE) 25 mg tablet, Take 1 tablet (25 mg total) by mouth daily, Disp: 30 tablet, Rfl: 2  •  docusate sodium (COLACE) 100 mg capsule, Take 1 capsule (100 mg total) by mouth 2 (two) times a day Hold for loose stools (Patient not taking: Reported on 4/27/2023), Disp: 60 capsule, Rfl: 0  •  folic acid (FOLVITE) 1 mg tablet, Take 1 tablet (1 mg total) by mouth daily Do not start before April 8, 2023  (Patient not taking: Reported on 4/13/2023), Disp: 30 tablet, Rfl: 0  •  furosemide (LASIX) 20 mg tablet, Take 1 tablet (20 mg total) by mouth daily Take 1 tablet (20 mg) for the next three days  (Patient not taking: Reported on 4/27/2023), Disp: 30 tablet, Rfl: 0  •  thiamine 100 MG tablet, Take 1 tablet (100 mg total) by mouth daily Do not start before April 8, 2023  (Patient not taking: Reported on 4/13/2023), Disp: 30 tablet, Rfl: 0    Social History     Socioeconomic History   • Marital status: /Civil Union     Spouse name: Not on file   • Number of children: Not on file   • Years of education: Not on file   • Highest education level: Not on file   Occupational History   • Not on file   Tobacco Use   • Smoking status: Former     Packs/day: 0 50     Types: Cigarettes   • Smokeless tobacco: Never   Vaping Use   • Vaping Use: Never used   Substance and Sexual Activity   • Alcohol use: Yes     Alcohol/week: 4 0 standard drinks     Types: 4 Shots of liquor per week   • Drug use: Yes     Types: Marijuana   • Sexual activity: Yes     Partners: Male   Other Topics Concern   • Not on file   Social History Narrative   • Not on file     Social Determinants of Health     Financial Resource Strain: Not on file   Food Insecurity: No Food Insecurity   • Worried About Running Out of Food in the Last Year: Never true   • Ran Out of Food in the Last Year: Never true   Transportation Needs: No Transportation Needs   • Lack of Transportation (Medical): No   • Lack of Transportation (Non-Medical):  No   Physical Activity: Not on file   Stress: Not on file   Social Connections: Not on file   Intimate Partner Violence: Not on file   Housing Stability: Low Risk    • Unable to Pay for Housing in the Last Year: No   • Number of Places Lived in the Last Year: 1   • Unstable Housing in the Last Year: No     Family History   Problem Relation Age of Onset   • "Diabetes Mother    • COPD Mother    • Hyperlipidemia Mother    • Stroke Mother    • Lung cancer Father        Vitals:  Blood pressure 142/86, pulse 84, height 5' 2\" (1 575 m), weight 64 4 kg (142 lb), SpO2 97 %, currently breastfeeding  Body mass index is 25 97 kg/m²  Wt Readings from Last 10 Encounters:   04/27/23 64 4 kg (142 lb)   04/13/23 63 1 kg (139 lb 3 2 oz)   04/07/23 62 8 kg (138 lb 7 2 oz)   11/28/22 60 3 kg (133 lb)   09/13/22 60 5 kg (133 lb 6 4 oz)   09/08/22 59 4 kg (131 lb)   08/04/22 59 6 kg (131 lb 6 4 oz)   06/07/22 63 2 kg (139 lb 4 8 oz)   11/17/20 59 kg (130 lb)   11/10/20 59 kg (130 lb)     Vitals:    04/27/23 1108   BP: 142/86   BP Location: Right arm   Patient Position: Sitting   Cuff Size: Standard   Pulse: 84   SpO2: 97%   Weight: 64 4 kg (142 lb)   Height: 5' 2\" (1 575 m)       Physical Exam  Constitutional:       Appearance: Normal appearance  HENT:      Head: Normocephalic  Nose: Nose normal       Mouth/Throat:      Mouth: Mucous membranes are moist    Eyes:      Conjunctiva/sclera: Conjunctivae normal    Neck:      Vascular: No JVD  Cardiovascular:      Rate and Rhythm: Normal rate and regular rhythm  Heart sounds: Murmur heard  Pulmonary:      Effort: Pulmonary effort is normal       Breath sounds: Normal breath sounds  Musculoskeletal:      Cervical back: Neck supple  Right lower leg: No edema  Left lower leg: No edema  Skin:     General: Skin is warm and dry  Neurological:      General: No focal deficit present  Mental Status: She is alert and oriented to person, place, and time     Psychiatric:         Mood and Affect: Mood normal          Behavior: Behavior normal          Labs & Results:  Lab Results   Component Value Date    WBC 11 42 (H) 04/05/2023    HGB 12 9 04/05/2023    HCT 38 9 04/05/2023    MCV 95 04/05/2023     (H) 04/05/2023     Lab Results   Component Value Date    SODIUM 136 04/14/2023    K 5 0 04/14/2023     " 04/14/2023    CO2 24 04/14/2023    BUN 29 (H) 04/14/2023    CREATININE 1 36 (H) 04/14/2023    GLUC 118 04/14/2023    CALCIUM 9 6 04/14/2023     No results found for: INR, PROTIME  Lab Results   Component Value Date    BNP 2,883 (H) 04/04/2023      Lab Results   Component Value Date    NTBNP 9,840 (H) 06/02/2022        Counseling / Coordination of Care  Total office time spent today 60 minutes  Greater than 50% of total time was spent with the patient and / or family counseling and / or coordination of care  Thank you for the opportunity to participate in the care of this patient      Yuliana Sánchez PA-C

## 2023-04-27 ENCOUNTER — OFFICE VISIT (OUTPATIENT)
Dept: CARDIOLOGY CLINIC | Facility: CLINIC | Age: 63
End: 2023-04-27

## 2023-04-27 VITALS
OXYGEN SATURATION: 97 % | DIASTOLIC BLOOD PRESSURE: 86 MMHG | WEIGHT: 142 LBS | HEART RATE: 84 BPM | SYSTOLIC BLOOD PRESSURE: 142 MMHG | BODY MASS INDEX: 26.13 KG/M2 | HEIGHT: 62 IN

## 2023-04-27 DIAGNOSIS — I50.22 CHRONIC SYSTOLIC CONGESTIVE HEART FAILURE (HCC): ICD-10-CM

## 2023-04-27 DIAGNOSIS — I42.8 NON-ISCHEMIC CARDIOMYOPATHY (HCC): Primary | ICD-10-CM

## 2023-04-27 RX ORDER — SPIRONOLACTONE 25 MG/1
12.5 TABLET ORAL DAILY
COMMUNITY
End: 2023-04-27

## 2023-04-27 RX ORDER — SPIRONOLACTONE 25 MG/1
25 TABLET ORAL DAILY
Qty: 30 TABLET | Refills: 2 | Status: SHIPPED | OUTPATIENT
Start: 2023-04-27

## 2023-04-27 NOTE — PATIENT INSTRUCTIONS
Start taking spironolactone 25 mg daily (increase from 12 5 mg daily )  Start Jardiance 10 mg daily next week (5/4)  Get labs as ordered 5-7 days after starting the Jardiance  Please weigh yourself every day (after emptying your bladder) and keep a detailed log of weights  Contact the Heart Failure program at [phone] if you gain 3 lbs overnight or 5 lbs in 5-7 days  Limit daily sodium/salt intake to 2000 mg daily to prevent fluid retention  Avoid canned foods, fast food/Chinese food, and processed meats (hot dogs, lunch meat, and sausage etc )  Caution with condiments  Limit fluid intake to 2000 mL or 2 liters (about 60-65 ounces) daily  Avoid electrolyte replacement drinks (such as Gatorade, Pedialyte, Propel, Liquid IV, etc )  Bring complete list of medications and log of daily weights to your follow-up appointment

## 2023-05-08 ENCOUNTER — CONSULT (OUTPATIENT)
Dept: HEMATOLOGY ONCOLOGY | Facility: CLINIC | Age: 63
End: 2023-05-08

## 2023-05-08 VITALS
HEART RATE: 81 BPM | WEIGHT: 140 LBS | HEIGHT: 62 IN | OXYGEN SATURATION: 97 % | RESPIRATION RATE: 17 BRPM | SYSTOLIC BLOOD PRESSURE: 144 MMHG | BODY MASS INDEX: 25.76 KG/M2 | DIASTOLIC BLOOD PRESSURE: 82 MMHG

## 2023-05-08 DIAGNOSIS — R76.8 ELEVATED SERUM IMMUNOGLOBULIN FREE LIGHT CHAIN LEVEL: ICD-10-CM

## 2023-05-08 DIAGNOSIS — I42.8 NON-ISCHEMIC CARDIOMYOPATHY (HCC): ICD-10-CM

## 2023-05-08 NOTE — PROGRESS NOTES
Oncology Outpatient Consult Note  Adilene Hancock 58 y o  female MRN: @ Encounter: [id number]        Date:  5/8/2023        CC:  Elevated Free Light Chains       HPI:  Adilene Hancock is seen for initial consultation 5/8/2023 regarding elevated free light chains  She presents in the office with her daughter Blane Mehta  She has PMH of COPD, Non-ischemic cardiomyopathy  She was recently hospitalized for heart failure with EF 28%, strain pattern on echo concerning for amyloid, patient scheduled for cardiac MRI  Patient is a former cigarette smoker, quit about a year ago  She was smoking a half a pack a day  She drinks alcohol daily  Most recent labs: Hgb 12 9, Platelets 079552, WBC 11 42, FLCR 2 15, no monoclonal bands noted on SPEP  Patient denies any new bone pain, fevers, frequent infections, unintentional weight loss, decreased appetite  Patient is experiencing night sweats, but not drenching  No lymphadenopathy noted on exam     Recent CT Chest shows stable mild right paratracheal lymphadenopathy  Enlarged subcarinal lymph node with a short axis of 2 cm  Stability over one year suggests a benign etiology  Patient has a personal history of cervical cancer in 1986, not requiring treatment  Her father had lung cancer, paternal aunt had breast cancer  ROS: As stated in history of present illness otherwise her 14 point review of systems today was negative  ECOG PS: 0     Test Results:    Imaging: No results found      Labs:   Lab Results   Component Value Date    WBC 11 42 (H) 04/05/2023    HGB 12 9 04/05/2023    HCT 38 9 04/05/2023    MCV 95 04/05/2023     (H) 04/05/2023     Lab Results   Component Value Date    K 5 0 04/14/2023     04/14/2023    CO2 24 04/14/2023    BUN 29 (H) 04/14/2023    CREATININE 1 36 (H) 04/14/2023    CALCIUM 9 6 04/14/2023    AST 33 04/04/2023    ALT 18 04/04/2023    ALKPHOS 126 (H) 04/04/2023    EGFR 41 04/14/2023         Lab Results   Component Value Date    SPEP See Comment 04/14/2023    UPEP See Comment 04/13/2023       Active Problems:   Patient Active Problem List   Diagnosis   • Open wound of right hip   • Chronic hip pain after total replacement of right hip joint   • History of leukocytosis   • Non-ischemic cardiomyopathy (HCC)   • Mediastinal lymphadenopathy   • Smokes cigarettes   • Alcohol ingestion, 1-4 drinks per day   • Tooth infection   • COPD (chronic obstructive pulmonary disease) ([facility] )   • Acute on chronic systolic heart failure ([facility] )       Past Medical History:   Past Medical History:   Diagnosis Date   • CHF (congestive heart failure) ([facility] )    • Hypertension        Surgical History:   Past Surgical History:   Procedure Laterality Date   • CARDIAC CATHETERIZATION N/A 6/6/2022    Procedure: Cardiac catheterization;  Surgeon: Phoenix Call MD;  Location: [facility] CARDIAC [facility]ATH [facility]; Service: Cardiology   • CERVICAL CONE BIOPSY     • FL GUIDED NEEDLE PLAC BX/ASP/INJ  10/21/2020   • HIP SURGERY     • HIP SURGERY         Family History:    Family History   Problem Relation Age of Onset   • Diabetes Mother    • COPD Mother    • Hyperlipidemia Mother    • Stroke Mother    • Lung cancer Father        Cancer-related family history includes Lung cancer in her father  Social History:   Social History     Socioeconomic History   • Marital status: /Civil Union     Spouse name: Not on file   • Number of children: Not on file   • Years of education: Not on file   • Highest education level: Not on file   Occupational History   • Not on file   Tobacco Use   • Smoking status: Former     Packs/day: 0 50     Types: Cigarettes   • Smokeless tobacco: Never   Vaping Use   • Vaping Use: Never used   Substance and Sexual Activity   • Alcohol use:  Yes     Alcohol/week: 4 0 standard drinks     Types: 4 Shots of liquor per week   • Drug use: Yes     Types: Marijuana   • Sexual activity: Yes     Partners: Male   Other Topics Concern   • Not on file   Social History Narrative   • Not on file     Social Determinants of Health     Financial Resource Strain: Not on file   Food Insecurity: No Food Insecurity   • Worried About Running Out of Food in the Last Year: Never true   • Ran Out of Food in the Last Year: Never true   Transportation Needs: No Transportation Needs   • Lack of Transportation (Medical): No   • Lack of Transportation (Non-Medical): No   Physical Activity: Not on file   Stress: Not on file   Social Connections: Not on file   Intimate Partner Violence: Not on file   Housing Stability: Low Risk    • Unable to Pay for Housing in the Last Year: No   • Number of Places Lived in the Last Year: 1   • Unstable Housing in the Last Year: No       Current Medications:   Current Outpatient Medications   Medication Sig Dispense Refill   • albuterol (PROVENTIL HFA,VENTOLIN HFA) 90 mcg/act inhaler Inhale 1 puff every 6 (six) hours as needed     • Empagliflozin (JARDIANCE) 10 MG TABS tablet Take 1 tablet (10 mg total) by mouth every morning Do not start before May 4, 2023  30 tablet 2   • metoprolol succinate (TOPROL-XL) 50 mg 24 hr tablet Take 1 tablet (50 mg total) by mouth daily Do not start before April 8, 2023  30 tablet 0   • spironolactone (ALDACTONE) 25 mg tablet Take 1 tablet (25 mg total) by mouth daily 30 tablet 2   • docusate sodium (COLACE) 100 mg capsule Take 1 capsule (100 mg total) by mouth 2 (two) times a day Hold for loose stools 60 capsule 0   • folic acid (FOLVITE) 1 mg tablet Take 1 tablet (1 mg total) by mouth daily Do not start before April 8, 2023  (Patient not taking: Reported on 4/13/2023) 30 tablet 0   • furosemide (LASIX) 20 mg tablet Take 1 tablet (20 mg total) by mouth daily Take 1 tablet (20 mg) for the next three days  (Patient not taking: Reported on 4/27/2023) 30 tablet 0   • thiamine 100 MG tablet Take 1 tablet (100 mg total) by mouth daily Do not start before April 8, 2023   (Patient not taking: Reported on 4/13/2023) 30 tablet 0     No current facility-administered medications for this visit  Allergies: Allergies   Allergen Reactions   • Codeine GI Intolerance and Vomiting   • Oxycodone-Acetaminophen GI Intolerance and Vomiting   • Propoxyphene Vomiting         Physical Exam:    Body surface area is 1 64 meters squared  Wt Readings from Last 3 Encounters:   05/08/23 63 5 kg (140 lb)   04/27/23 64 4 kg (142 lb)   04/13/23 63 1 kg (139 lb 3 2 oz)        Temp Readings from Last 3 Encounters:   04/07/23 99 1 °F (37 3 °C)   06/07/22 (!) 97 °F (36 1 °C) (Temporal)   11/17/20 97 8 °F (36 6 °C)        BP Readings from Last 3 Encounters:   05/08/23 144/82   04/27/23 142/86   04/13/23 138/88         Pulse Readings from Last 3 Encounters:   05/08/23 81   04/27/23 84   04/13/23 88     @LASTSAO2(3)@    Physical Exam     Constitutional   General appearance: No acute distress, well appearing and well nourished  Eyes   Conjunctiva and lids: No swelling, erythema or discharge  Pupils and irises: Equal, round and reactive to light  Ears, Nose, Mouth, and Throat   External inspection of ears and nose: Normal     Nasal mucosa, septum, and turbinates: Normal without edema or erythema  Oropharynx: Normal with no erythema, edema, exudate or lesions  Pulmonary   Respiratory effort: No increased work of breathing or signs of respiratory distress  Auscultation of lungs: Clear to auscultation  Cardiovascular   Palpation of heart: Normal PMI, no thrills  Auscultation of heart: Normal rate and rhythm, normal S1 and S2, without murmurs  Examination of extremities for edema and/or varicosities: Normal     Carotid pulses: Normal     Abdomen   Abdomen: Non-tender, no masses  Liver and spleen: No hepatomegaly or splenomegaly  Lymphatic   Palpation of lymph nodes in neck: No lymphadenopathy  Musculoskeletal   Gait and station: Normal     Digits and nails: Normal without clubbing or cyanosis      Inspection/palpation of joints, bones, and muscles: Normal     Skin   Skin and subcutaneous tissue: Normal without rashes or lesions  Neurologic   Cranial nerves: Cranial nerves 2-12 intact  Sensation: No sensory loss  Psychiatric   Orientation to person, place, and time: Normal     Mood and affect: Normal           Assessment/ Plan:  Reviewed labs with patient and discussed plasma cells are the most prolific antibody-producing cells and with a plasma cell disorder/malignancy, they overproduce single antibody in the blood stream, monoclonal protein (M Jared)  The monoclonal protein was not detected on her blood work therefore, recommend monitoring of labs (SPEP, FLC) by PCP every 6 months  Patient's elevated WBC and Platelets likely related history of chronic smoking and inflammatory process, however, discussed we can get additional labs such as iron studies, vitamin B12, folate, CRP to evaluate further  Since patient is having night sweats, CT shows stable lymphadenopathy in the mediastinal area, recommend we get a CT scan chest abdomen pelvis due to patient's personal cancer history  Patient has declined recommendations at this time  She will think about it and get back to us if she is interested  For now, she can see us as needed  I spent 60 minutes in chart review, face to face counseling, coordination of care, and documentation

## 2023-05-16 NOTE — PROGRESS NOTES
Advanced Heart Failure / Pulmonary Hypertension Outpatient Visit    Osvaldo Zamorano 58 y o  female   MRN: [id number]  Encounter: [id number]    Assessment:  Patient Active Problem List    Diagnosis Date Noted   • Acute on chronic systolic heart failure ([facility] ) 04/04/2023   • Tooth infection 06/04/2022   • COPD (chronic obstructive pulmonary disease) ([place] [id number] ) 06/04/2022   • History of leukocytosis 06/03/2022   • Non-ischemic cardiomyopathy ([place] [address] ) 06/03/2022   • Mediastinal lymphadenopathy 06/03/2022   • Smokes cigarettes 06/03/2022   • Alcohol ingestion, 1-4 drinks per day 06/03/2022   • Open wound of right hip 09/29/2020   • Chronic hip pain after total replacement of right hip joint 09/29/2020       Today's Plan:  • Did not tolerate Jardiance  Feels much improved after stopping it  • Restart losartan; price check sent for Entresto  • Strain pattern on recent echo concerning for possible amyloid  AL screening labs sent and cardiac MRI ordered  Serum free light chains abnormal; referred to hematology  • Has follow up with cardiologist in \[facility]\"" arranged for June  • Repeat TTE 7/2023  • Emphasized lifestyle and dietary modifications  • Daily weights, 2g sodium restriction, 2L fluid restriction  Plan:  Chronic HFrEF; LVEF 28%; LVIDd 5 2cm; NYHA II; ACC/AHA Stage C   Etiology: nonischemic  Chronic daily ETOH use  ? amyloid  Planned for outpatient cardiac MRI per inpatient cardiology note  Weight of 138 lbs on 4/7/23  139 lbs on 4/13  Today, weighs 135 lbs  Most recent BMP from 4/14/23: Na 136, K 5 0, creatinine 1 36, BUN 29, eGFR 41  TTE 6/3/22: LVEF 28%  TTE 9/8/22: LVEF 35%  LA moderately dilated  Small patent foramen ovale noted atrial septum  IVC normal    TTE 11/28/22: LVEF 44%  Mild global hypokinesis  TTE 4/5/23: LVEF 28%, global longitudinal strain reduced at -8% with an apical sparing bulls-eye pattern, severe global hypokinesis with regional variation   Diastolic function severely abnormal consistent with ungraded restrictive relaxation   Severe biatrial dilatation  Patent foramen ovale atrial septum with left to right shunting  Mild to moderate MR, mild to moderate TR, PASP 49 mmHg  IVC dilated  C 6/6/22: Normal epicardial coronary arteries  No voltage to mass mismatch evident on EKG  Pharmacotherapies / Neurohormonal Blockade:  --Beta Blocker: metoprolol succinate 50 mg daily  --ARNi / ACEi / ARB: Losartan 25 mg daily  --Aldosterone Antagonist: Spironolactone 25 mg daily  --SGLT2 Inhibitor: did not tolerate Jardiance   --Diuretic: Lasix 20 mg PRN    Sudden Cardiac Death Risk Reduction:  --ICD: LVEF 28%  Previously deferred ICD placement per chart review  Again declined consideration for ICD  Repeat TTE in 3 months when GDMT optimized  Electrical Resynchronization:  --Candidacy for BiV device: narrow QRSd    Advanced Therapies: Will continue to monitor  --Inotrope:  --LVAD/Transplant Candidacy:    Pulmonary hypertension  Group II and possible group III with dilated cardiomyopathy and +/- COPD  Chronic daily alcohol use  Extensive history tobacco abuse  Quit in 2022  Chronic leukocytosis  Thrombocytosis    HPI:   Hien Ledezma is a 79-year-old female who presents for follow up  PMH includes nonischemic dilated cardiomyopathy, HFrEF with LVEF 28%, pulmonary hypertension, chronic alcohol use, chronic leukocytosis, tobacco use  She was recently admitted to [facility] with weight gain, shortness of breath with exertion, and orthopnea  She was reportedly not taking her HF medications at home, and weight was up approximately 20 lbs (dry weight ~135 lbs, weight on admission 155 lbs)  BNP on admission 2800  CXR with cardiomegaly, CTA PE study with no PE, interstitial edema, healing left lateral 6th rib fracture, and stable mild right paratracheal lymphadenopathy and enlarged subcarinal node since April 2023  She was diuresed with IV Lasix, and magnesium was repleted with IV mag  She developed a mild ADRYAN with creatinine 1 66 (baseline 0 8-1 2), and spironolactone and oral diuretics were held at discharge with plans to resume outpatient  Weight at discharge 138 lbs  Inpatient TTE concerning for ? amyloid; planned for outpatient cardiac MRI  4/13/23: presents today for follow up  Weights have been stable at home  No SOB, PND, orthopnea  No chest pain, palpitations, near syncope  No LE edema or abdominal distension  Intermittent compliance with medications previously; never took Jardiance when prescribed it, intermittent compliance with spironolactone, was not taking metoprolol prior to hospitalization  Started this after discharge  Hasn't been taking losartan since the autumn  Unsure how much she's drinking, thinks diet is adequate  Denies excess sodium consumption  Reports that she experienced SOB when she was volume overloaded, but denies any symptoms consistent with that since discharge  Reports that her mother had heart failure starting in her 45s, no known history of CAD  No family history sudden cardiac death  Recently quit smoking  Has been drinking approximately 2 drinks (rum and coke) per day for many years, denies alcohol use since discharge  4/27/23: presents today for follow up  Feeling great, no SOB/JACKSON  No chest pain, palpitations, dizziness  No LE edema, PND, orthopnea  Compliant with medications - started the spironolactone as directed after last visit  Felt much better after taking low dose Lasix x 3 days after last visit  Weights have been stable (138, 139 lbs)  Trying to stay away from salt, drinking 32-48 oz of liquid per day - mix of water and iced tea  Urinating well - urinated a lot when taking Lasix  Amenable to restarting/taking recommended GDMT going forward  5/17/23: presents today for follow up  Feeling good  Did not tolerate taking Jardiance; felt dizzy, nauseous, lightheaded   Symptoms started within 1-2 days of starting the medication and completely resolved after stopping it  Otherwise denies SOB, PND, orthopnea, LE swelling, abdominal distension  Weights have been stable  Has been staying within 2L fluid restriction, doing her best with salt  Cardiac MRI scheduled for next month  [facility]  Has not needed to take any additional Lasix  Past Medical History:   Diagnosis Date   • CHF (congestive heart failure) (HCC)    • Hypertension      Review of Systems   Constitutional: Negative for chills, fever and unexpected weight change  HENT: Negative for ear pain and sore throat  Eyes: Negative for pain and visual disturbance  Respiratory: Negative for cough and shortness of breath  Cardiovascular: Negative for chest pain, palpitations and leg swelling  Gastrointestinal: Negative for abdominal pain and vomiting  Genitourinary: Negative for dysuria and hematuria  Musculoskeletal: Negative for arthralgias and back pain  Skin: Negative for color change and rash  Neurological: Negative for seizures and syncope  All other systems reviewed and are negative  14-point ROS completed and negative except as stated above and/or in the HPI        Allergies   Allergen Reactions   • Codeine GI Intolerance and Vomiting   • Oxycodone-Acetaminophen GI Intolerance and Vomiting   • Propoxyphene Vomiting       Current Outpatient Medications:   •  albuterol (PROVENTIL HFA,VENTOLIN HFA) 90 mcg/act inhaler, Inhale 1 puff every 6 (six) hours as needed, Disp: , Rfl:   •  losartan (COZAAR) 25 mg tablet, Take 1 tablet (25 mg total) by mouth daily, Disp: 30 tablet, Rfl: 3  •  metoprolol succinate (TOPROL-XL) 50 mg 24 hr tablet, Take 1 tablet (50 mg total) by mouth daily Do not start before April 8, 2023 , Disp: 30 tablet, Rfl: 0  •  spironolactone (ALDACTONE) 25 mg tablet, Take 1 tablet (25 mg total) by mouth daily, Disp: 30 tablet, Rfl: 2  •  furosemide (LASIX) 20 mg tablet, Take 1 tablet (20 mg total) by mouth daily "Take 1 tablet (20 mg) for the next three days  (Patient not taking: Reported on 4/27/2023), Disp: 30 tablet, Rfl: 0    Social History     Socioeconomic History   • Marital status: /Civil Union     Spouse name: Not on file   • Number of children: Not on file   • Years of education: Not on file   • Highest education level: Not on file   Occupational History   • Not on file   Tobacco Use   • Smoking status: Former     Packs/day: 0 50     Types: Cigarettes   • Smokeless tobacco: Never   Vaping Use   • Vaping Use: Never used   Substance and Sexual Activity   • Alcohol use: Yes     Alcohol/week: 4 0 standard drinks     Types: 4 Shots of liquor per week   • Drug use: Yes     Types: Marijuana   • Sexual activity: Yes     Partners: Male   Other Topics Concern   • Not on file   Social History Narrative   • Not on file     Social Determinants of Health     Financial Resource Strain: Not on file   Food Insecurity: No Food Insecurity   • Worried About Running Out of Food in the Last Year: Never true   • Ran Out of Food in the Last Year: Never true   Transportation Needs: No Transportation Needs   • Lack of Transportation (Medical): No   • Lack of Transportation (Non-Medical): No   Physical Activity: Not on file   Stress: Not on file   Social Connections: Not on file   Intimate Partner Violence: Not on file   Housing Stability: Low Risk    • Unable to Pay for Housing in the Last Year: No   • Number of Places Lived in the Last Year: 1   • Unstable Housing in the Last Year: No     Family History   Problem Relation Age of Onset   • Diabetes Mother    • COPD Mother    • Hyperlipidemia Mother    • Stroke Mother    • Lung cancer Father        Vitals:  Blood pressure 142/100, pulse 83, height 5' 2\" (1 575 m), weight 61 2 kg (135 lb), SpO2 98 %, currently breastfeeding  Body mass index is 24 69 kg/m²    Wt Readings from Last 10 Encounters:   05/17/23 61 2 kg (135 lb)   05/08/23 63 5 kg (140 lb)   04/27/23 64 4 kg (142 lb) " "  04/13/23 63 1 kg (139 lb 3 2 oz)   04/07/23 62 8 kg (138 lb 7 2 oz)   11/28/22 60 3 kg (133 lb)   09/13/22 60 5 kg (133 lb 6 4 oz)   09/08/22 59 4 kg (131 lb)   08/04/22 59 6 kg (131 lb 6 4 oz)   06/07/22 63 2 kg (139 lb 4 8 oz)     Vitals:    05/17/23 1109   BP: 142/100   BP Location: Left arm   Patient Position: Sitting   Cuff Size: Standard   Pulse: 83   SpO2: 98%   Weight: 61 2 kg (135 lb)   Height: 5' 2\" (1 575 m)       Physical Exam  Constitutional:       Appearance: Normal appearance  HENT:      Head: Normocephalic  Nose: Nose normal       Mouth/Throat:      Mouth: Mucous membranes are moist    Eyes:      Conjunctiva/sclera: Conjunctivae normal    Neck:      Vascular: No JVD  Cardiovascular:      Rate and Rhythm: Normal rate and regular rhythm  Heart sounds: Murmur heard  Pulmonary:      Effort: Pulmonary effort is normal       Breath sounds: Normal breath sounds  Musculoskeletal:      Cervical back: Neck supple  Right lower leg: No edema  Left lower leg: No edema  Skin:     General: Skin is warm and dry  Neurological:      General: No focal deficit present  Mental Status: She is alert and oriented to person, place, and time  Psychiatric:         Mood and Affect: Mood normal          Behavior: Behavior normal          Labs & Results:  Lab Results   Component Value Date    WBC 11 42 (H) 04/05/2023    HGB 12 9 04/05/2023    HCT 38 9 04/05/2023    MCV 95 04/05/2023     (H) 04/05/2023     Lab Results   Component Value Date    SODIUM 136 04/14/2023    K 5 0 04/14/2023     04/14/2023    CO2 24 04/14/2023    BUN 29 (H) 04/14/2023    CREATININE 1 36 (H) 04/14/2023    GLUC 118 04/14/2023    CALCIUM 9 6 04/14/2023     No results found for: INR, PROTIME  Lab Results   Component Value Date    BNP 2,883 (H) 04/04/2023      Lab Results   Component Value Date    NTBNP 9,840 (H) 06/02/2022        Counseling / Coordination of Care  Total office time spent today 60 minutes   " Greater than 50% of total time was spent with the patient and / or family counseling and / or coordination of care  Thank you for the opportunity to participate in the care of this patient      Keisha Mitchell PA-C

## 2023-05-17 ENCOUNTER — OFFICE VISIT (OUTPATIENT)
Dept: CARDIOLOGY CLINIC | Facility: CLINIC | Age: 63
End: 2023-05-17

## 2023-05-17 VITALS
HEIGHT: 62 IN | BODY MASS INDEX: 24.84 KG/M2 | SYSTOLIC BLOOD PRESSURE: 142 MMHG | WEIGHT: 135 LBS | HEART RATE: 83 BPM | DIASTOLIC BLOOD PRESSURE: 100 MMHG | OXYGEN SATURATION: 98 %

## 2023-05-17 DIAGNOSIS — I50.22 CHRONIC SYSTOLIC HEART FAILURE (HCC): ICD-10-CM

## 2023-05-17 DIAGNOSIS — I42.8 NON-ISCHEMIC CARDIOMYOPATHY (HCC): Primary | ICD-10-CM

## 2023-05-17 RX ORDER — LOSARTAN POTASSIUM 25 MG/1
25 TABLET ORAL DAILY
Qty: 30 TABLET | Refills: 3 | Status: SHIPPED | OUTPATIENT
Start: 2023-05-17

## 2023-05-17 NOTE — PATIENT INSTRUCTIONS
Start taking losartan 25 mg daily again  Get labs as ordered in the next week  Follow up with your cardiologist as scheduled in [place]  Please weigh yourself every day (after emptying your bladder) and keep a detailed log of weights  Contact the Heart Failure program at [phone] if you gain 3 lbs overnight or 5 lbs in 5-7 days  Limit daily sodium/salt intake to 2000 mg daily to prevent fluid retention  Avoid canned foods, fast food/Chinese food, and processed meats (hot dogs, lunch meat, and sausage etc )  Caution with condiments  Limit fluid intake to 2000 mL or 2 liters (about 60-65 ounces) daily  Avoid electrolyte replacement drinks (such as Gatorade, Pedialyte, Propel, Liquid IV, etc )  Bring complete list of medications and log of daily weights to your follow-up appointment

## 2023-05-18 ENCOUNTER — TELEPHONE (OUTPATIENT)
Dept: CARDIOLOGY CLINIC | Facility: CLINIC | Age: 63
End: 2023-05-18

## 2023-05-18 NOTE — TELEPHONE ENCOUNTER
Current estimated cost of entresto 24/26 mg per [facility] is $0 for a 30 day supply

## 2023-06-05 ENCOUNTER — OFFICE VISIT (OUTPATIENT)
Dept: CARDIOLOGY CLINIC | Facility: CLINIC | Age: 63
End: 2023-06-05
Payer: MEDICARE

## 2023-06-05 VITALS
HEART RATE: 71 BPM | HEIGHT: 62 IN | OXYGEN SATURATION: 98 % | BODY MASS INDEX: 24.48 KG/M2 | DIASTOLIC BLOOD PRESSURE: 86 MMHG | WEIGHT: 133 LBS | SYSTOLIC BLOOD PRESSURE: 128 MMHG

## 2023-06-05 DIAGNOSIS — I50.22 CHRONIC SYSTOLIC CONGESTIVE HEART FAILURE (HCC): ICD-10-CM

## 2023-06-05 DIAGNOSIS — I42.8 NON-ISCHEMIC CARDIOMYOPATHY (HCC): Primary | ICD-10-CM

## 2023-06-05 DIAGNOSIS — I50.9 CHF (CONGESTIVE HEART FAILURE) (HCC): ICD-10-CM

## 2023-06-05 DIAGNOSIS — J43.2 CENTRILOBULAR EMPHYSEMA (HCC): ICD-10-CM

## 2023-06-05 DIAGNOSIS — I50.23 ACUTE ON CHRONIC SYSTOLIC HEART FAILURE (HCC): ICD-10-CM

## 2023-06-05 PROCEDURE — 99214 OFFICE O/P EST MOD 30 MIN: CPT

## 2023-06-05 RX ORDER — SACUBITRIL AND VALSARTAN 24; 26 MG/1; MG/1
1 TABLET, FILM COATED ORAL 2 TIMES DAILY
Qty: 180 TABLET | Refills: 3 | Status: SHIPPED | OUTPATIENT
Start: 2023-06-05

## 2023-06-05 RX ORDER — SPIRONOLACTONE 25 MG/1
25 TABLET ORAL DAILY
Qty: 90 TABLET | Refills: 3 | Status: SHIPPED | OUTPATIENT
Start: 2023-06-05

## 2023-06-05 RX ORDER — METOPROLOL SUCCINATE 50 MG/1
50 TABLET, EXTENDED RELEASE ORAL DAILY
Qty: 90 TABLET | Refills: 3 | Status: SHIPPED | OUTPATIENT
Start: 2023-06-05

## 2023-06-05 NOTE — PROGRESS NOTES
Cardiology   MD Piedad Sidhu MD, Erlin Ray DO, VASILIY Londono MD Marline Davis, DO, Francisca Arana DO, [facility]  -------------------------------------------------------------------  Formerly [facility]  [address]  [place], [place] [id number] [phone]  [id number] [phone] [id number]  06/05/23  Cele Sebastian  YOB: 1960   MRN: [id number]      Referring Physician: Stevo Navarro DO  [id number] [facility] [address]  [place],  [address] [place]     HPI: Cele Sebastian is a 58 y o  female with:   Heart failure with reduced ejection fraction EF 28% April 2023 with strain with apical sparing bull's-eye pattern  Dilated cardiomyopathy  Group 2 pulmonary pretension  COPD  Nonischemic cardiomyopathy  Tobacco abuse  Alcohol use  Chronic leukocytosis  Mediastinal lymphadenopathy thought to be reactive in nature  Thrombocytosis    She has been following with advanced heart failure  Did not tolerate Jardiance, felt much better after stopping it  Tiny Jarred is covered and $0 co-pay however she has not started this, will Rx this today  Otherwise she feels well at this time, no significant shortness of breath or lower extremity swelling    Review of Systems   Constitutional: Negative for chills and fever  HENT: Negative for facial swelling and sore throat  Eyes: Negative for visual disturbance  Respiratory: Negative for cough, chest tightness, shortness of breath and wheezing  Cardiovascular: Negative for chest pain, palpitations and leg swelling  Gastrointestinal: Negative for abdominal pain, blood in stool, constipation, diarrhea, nausea and vomiting  Endocrine: Negative for cold intolerance and heat intolerance  Genitourinary: Negative for decreased urine volume, difficulty urinating, dysuria and hematuria  Musculoskeletal: Negative for arthralgias, back pain and myalgias  Skin: Negative for rash     Neurological: Negative for dizziness, syncope, weakness and numbness  Psychiatric/Behavioral: Negative for agitation, behavioral problems and confusion  The patient is not nervous/anxious  OBJECTIVE  Vitals:    06/05/23 1142   BP: 128/86   Pulse: 71   SpO2: 98%       Physical Exam  Constitutional: awake, alert and oriented, in no acute distress, no obvious deformities  Head: Normocephalic, without obvious abnormality, atraumatic  Eyes: conjunctivae clear and moist  Sclera anicteric  No xanthelasmas  Pupils equal bilaterally  Extraocular motions are full  Ear nose mouth and throat: ears are symmetrical bilaterally, hearing appears to be equal bilaterally, no nasal discharge or epistaxis, oropharynx is clear with moist mucous membranes  Neck: Trachea is midline, neck is supple, no thyromegaly or significant lymphadenopathy, there is full range of motion  Lungs: clear to auscultation bilaterally, no wheezes, no rales, no rhonchi, no accessory muscle use, breathing is nonlabored  Heart: regular rate and rhythm, S1, S2 normal, no murmur, no click, no rub and no gallop, no lower extremity edema  Abdomen: soft, non-tender; bowel sounds normal; no masses, no organomegaly  Psychiatric: Patient is oriented to time, place, person, mood/affect is negative for depression, anxiety, agitation, appears to have appropriate insight  Skin: Skin is warm, dry, intact  No obvious rashes or lesions on exposed extremities  Nail beds are pink with no cyanosis or clubbing  EKG:  No results found for this visit on 06/05/23       IMPRESSION:  Heart failure with reduced ejection fraction EF 28% April 2023 with strain with apical sparing bull's-eye pattern  Dilated cardiomyopathy  Group 2 pulmonary pretension  COPD  Nonischemic cardiomyopathy  Tobacco abuse  Alcohol use  Chronic leukocytosis  Mediastinal lymphadenopathy thought to be reactive in nature  Thrombocytosis    She continues to decline consideration for ICD     DISCUSSION/RECOMMENDATIONS:  We will switch from [facility] to [facility] today, Rx has been sent  Continue with spironolactone 25 mg  Continue with metoprolol succinate 50 mg  Continue with furosemide 20 mg as needed  She is following closely with advanced heart failure  Plan to repeat echo once on maximum tolerated dose of goal-directed medical therapy for heart failure with reduced ejection fraction  Cardiac MRI as scheduled, pending    Sánchez Huitron DO, [facility], [facility]  --------------------------------------------------------------------------------  TREADMILL STRESS  No results found for this or any previous visit      ----------------------------------------------------------------------------------------------  NUCLEAR STRESS TEST: No results found for this or any previous visit  No results found for this or any previous visit       --------------------------------------------------------------------------------  CATH:  No results found for this or any previous visit     --------------------------------------------------------------------------------  ECHO:   No results found for this or any previous visit  No results found for this or any previous visit     --------------------------------------------------------------------------------  HOLTER  No results found for this or any previous visit  No results found for this or any previous visit     --------------------------------------------------------------------------------  CAROTIDS  No results found for this or any previous visit      --------------------------------------------------------------------------------  Diagnoses and all orders for this visit:    Non-ischemic cardiomyopathy ([facility] )  -     sacubitril-valsartan (Entresto) 24-26 MG TABS; Take 1 tablet by mouth 2 (two) times a day  -     spironolactone (ALDACTONE) 25 mg tablet;  Take 1 tablet (25 mg total) by mouth daily    Acute on chronic systolic heart failure ([facility] )  - sacubitril-valsartan (Entresto) 24-26 MG TABS; Take 1 tablet by mouth 2 (two) times a day    Centrilobular emphysema (HCC)    CHF (congestive heart failure) (HCC)  -     metoprolol succinate (TOPROL-XL) 50 mg 24 hr tablet; Take 1 tablet (50 mg total) by mouth daily  -     sacubitril-valsartan (Entresto) 24-26 MG TABS; Take 1 tablet by mouth 2 (two) times a day    Chronic systolic congestive heart failure (HCC)  -     spironolactone (ALDACTONE) 25 mg tablet; Take 1 tablet (25 mg total) by mouth daily       ======================================================    Past Medical History:   Diagnosis Date   • CHF (congestive heart failure) ([facility] [facility] )    • Hypertension      Past Surgical History:   Procedure Laterality Date   • CARDIAC CATHETERIZATION N/A 6/6/2022    Procedure: Cardiac catheterization;  Surgeon: Humza Bella MD;  Location: [facility] CARDIAC CATH LAB; Service: Cardiology   • CERVICAL CONE BIOPSY     • FL GUIDED NEEDLE PLAC BX/ASP/INJ  10/21/2020   • HIP SURGERY     • HIP SURGERY           Medications  Current Outpatient Medications   Medication Sig Dispense Refill   • albuterol (PROVENTIL HFA,VENTOLIN HFA) 90 mcg/act inhaler Inhale 1 puff every 6 (six) hours as needed     • metoprolol succinate (TOPROL-XL) 50 mg 24 hr tablet Take 1 tablet (50 mg total) by mouth daily 90 tablet 3   • sacubitril-valsartan (Entresto) 24-26 MG TABS Take 1 tablet by mouth 2 (two) times a day 180 tablet 3   • spironolactone (ALDACTONE) 25 mg tablet Take 1 tablet (25 mg total) by mouth daily 90 tablet 3   • furosemide (LASIX) 20 mg tablet Take 1 tablet (20 mg total) by mouth daily Take 1 tablet (20 mg) for the next three days  (Patient not taking: Reported on 6/5/2023) 30 tablet 0     No current facility-administered medications for this visit          Allergies   Allergen Reactions   • Codeine GI Intolerance and Vomiting   • Oxycodone-Acetaminophen GI Intolerance and Vomiting   • Propoxyphene Vomiting       Social History Socioeconomic History   • Marital status: /Civil Union     Spouse name: Not on file   • Number of children: Not on file   • Years of education: Not on file   • Highest education level: Not on file   Occupational History   • Not on file   Tobacco Use   • Smoking status: Former     Packs/day: 0 50     Types: Cigarettes   • Smokeless tobacco: Never   Vaping Use   • Vaping Use: Never used   Substance and Sexual Activity   • Alcohol use: Yes     Alcohol/week: 4 0 standard drinks of alcohol     Types: 4 Shots of liquor per week   • Drug use: Yes     Types: Marijuana   • Sexual activity: Yes     Partners: Male   Other Topics Concern   • Not on file   Social History Narrative   • Not on file     Social Determinants of Health     Financial Resource Strain: Not on file   Food Insecurity: No Food Insecurity (4/6/2023)    Hunger Vital Sign    • Worried About Running Out of Food in the Last Year: Never true    • Ran Out of Food in the Last Year: Never true   Transportation Needs: No Transportation Needs (4/6/2023)    PRAPARE - Transportation    • Lack of Transportation (Medical): No    • Lack of Transportation (Non-Medical):  No   Physical Activity: Not on file   Stress: Not on file   Social Connections: Not on file   Intimate Partner Violence: Not on file   Housing Stability: Low Risk  (4/6/2023)    Housing Stability Vital Sign    • Unable to Pay for Housing in the Last Year: No    • Number of Places Lived in the Last Year: 1    • Unstable Housing in the Last Year: No        Family History   Problem Relation Age of Onset   • Diabetes Mother    • COPD Mother    • Hyperlipidemia Mother    • Stroke Mother    • Lung cancer Father        Lab Results   Component Value Date    HCT 38 9 04/05/2023    HGB 12 9 04/05/2023    MCV 95 04/05/2023     (H) 04/05/2023    WBC 11 42 (H) 04/05/2023      Lab Results   Component Value Date    BUN 29 (H) 04/14/2023    CALCIUM 9 6 04/14/2023     04/14/2023    CO2 24 04/14/2023 "   CREATININE 1 36 (H) 04/14/2023    GLUC 118 04/14/2023    K 5 0 04/14/2023    SODIUM 136 04/14/2023      No results found for: \"HGBA1C\"   No results found for: \"CHOL\"  Lab Results   Component Value Date    HDL 55 06/02/2022     Lab Results   Component Value Date    LDLCALC 85 06/02/2022     Lab Results   Component Value Date    TRIG 78 06/02/2022     No results found for: \"CHOLHDL\"   No results found for: \"INR\", \"PROTIME\"       Patient Active Problem List    Diagnosis Date Noted   • Acute on chronic systolic heart failure ([facility] ) 04/04/2023   • Tooth infection 06/04/2022   • COPD (chronic obstructive pulmonary disease) ([facility] ) 06/04/2022   • History of leukocytosis 06/03/2022   • Non-ischemic cardiomyopathy ([facility] ) 06/03/2022   • Mediastinal lymphadenopathy 06/03/2022   • Smokes cigarettes 06/03/2022   • Alcohol ingestion, 1-4 drinks per day 06/03/2022   • Open wound of right hip 09/29/2020   • Chronic hip pain after total replacement of right hip joint 09/29/2020       Portions of the record may have been created with voice recognition software  Occasional wrong word or \"sound a like\" substitutions may have occurred due to the inherent limitations of voice recognition software  Read the chart carefully and recognize, using context, where substitutions have occurred      Biagio Gitelman, DO, [facility]  6/5/2023 2:27 PM          "

## 2023-07-03 ENCOUNTER — TELEPHONE (OUTPATIENT)
Dept: CARDIOLOGY CLINIC | Facility: CLINIC | Age: 63
End: 2023-07-03

## 2023-11-09 ENCOUNTER — VBI (OUTPATIENT)
Dept: ADMINISTRATIVE | Facility: OTHER | Age: 63
End: 2023-11-09

## 2024-02-05 NOTE — PROGRESS NOTES
Advanced Heart Failure / Pulmonary Hypertension Outpatient Visit    Ellie Sharmar 63 y.o. 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  Symptoms essentially resolved per patient and neuro exam largely nonfocal today with the exception of trace weakness distal LUE.  Advised hospital evaluation, but patient would prefer to avoid the hospital.  Stat MRI brain ordered and neurology referral placed. Would not start antiplatelet until MRI resulted.   cMRI not yet completed. Will schedule.  Did not tolerate Jardiance.  Reports that Entresto made her feel very lightheaded and unwell, so she stopped taking it.  Has been compliant with metoprolol and spironolactone.  Has not needed to take Lasix.    Hypertensive in the office today, consistent with her baseline. Losartan prescription sent, plan to start after brain MRI resulted.  Strain pattern on previous echo concerning for possible amyloid. AL screening labs sent and cardiac MRI ordered at previous visit. Serum free light chains abnormal; referred to hematology; no further workup from their perspective at this time.   Emphasized lifestyle and dietary modifications.  Daily weights, 2g sodium restriction, 2L fluid restriction.    Plan:  Chronic HFrEF; LVEF 28%; LVIDd 5.2cm; NYHA II; ACC/AHA  Stage C   Etiology: nonischemic. Chronic daily ETOH use. ?amyloid.   Planned for outpatient cardiac MRI per inpatient cardiology note.       Weight of 138 lbs on 4/7/23. 139 lbs on 4/13, 135 lbs. Today, weighs 135 lbs.   Most recent BMP from 4/14/23: Na 136, K 5.0, creatinine 1.36, BUN 29, eGFR 41.     TTE 6/3/22: LVEF 28%.  TTE 9/8/22: LVEF 35%. LA moderately dilated. Small patent foramen ovale noted atrial septum. IVC normal.   TTE 11/28/22: LVEF 44%. Mild global hypokinesis.  TTE 4/5/23: LVEF 28%, global longitudinal strain reduced at -8% with an apical sparing bulls-eye pattern, severe global hypokinesis with regional variation. Diastolic function severely abnormal consistent with ungraded restrictive relaxation.  Severe biatrial dilatation. Patent foramen ovale atrial septum with left to right shunting. Mild to moderate MR, mild to moderate TR, PASP 49 mmHg. IVC dilated.     C 6/6/22: Normal epicardial coronary arteries.     No voltage to mass mismatch evident on EKG.     Pharmacotherapies / Neurohormonal Blockade:  --Beta Blocker: metoprolol succinate 50 mg daily.  --ARNi / ACEi / ARB: Entresto 24-26 mg BID, stopped due to lightheadedness.  Plan to start losartan at 12.5 mg daily and titrate up.  --Aldosterone Antagonist: Spironolactone 25 mg daily  --SGLT2 Inhibitor: did not tolerate Jardiance   --Diuretic: Lasix 20 mg PRN, has not needed    Sudden Cardiac Death Risk Reduction:  --ICD: LVEF 28%. Previously deferred ICD placement. Repeat TTE pending.    Electrical Resynchronization:  --Candidacy for BiV device: narrow QRSd    Advanced Therapies: Will continue to monitor.  --Inotrope:  --LVAD/Transplant Candidacy:    Pulmonary hypertension  Group II and possible group III with dilated cardiomyopathy and +/- COPD  Chronic daily alcohol use  Extensive history tobacco abuse  Quit in 2022  Chronic leukocytosis  Thrombocytosis    HPI:   Elile Lazo is a 62-year-old female who presents for follow up. [facility]  includes nonischemic dilated cardiomyopathy, HFrEF with LVEF 28%, pulmonary hypertension, chronic alcohol use, chronic leukocytosis, tobacco use. She was recently admitted to [facility] with weight gain, shortness of breath with exertion, and orthopnea. She was reportedly not taking her HF medications at home, and weight was up approximately 20 lbs (dry weight ~135 lbs, weight on admission 155 lbs). BNP on admission 2800. CXR with cardiomegaly, CTA PE study with no PE, interstitial edema, healing left lateral 6th rib fracture, and stable mild right paratracheal lymphadenopathy and enlarged subcarinal node since April 2023.     She was diuresed with IV Lasix, and magnesium was repleted with IV mag. She developed a mild ADRYAN with creatinine 1.66 (baseline 0.8-1.2), and spironolactone and oral diuretics were held at discharge with plans to resume outpatient. Weight at discharge 138 lbs. Inpatient TTE concerning for ?amyloid; planned for outpatient cardiac MRI.     4/13/23: presents today for follow up. Weights have been stable at home. No SOB, PND, orthopnea. No chest pain, palpitations, near syncope. No LE edema or abdominal distension. Intermittent compliance with medications previously; never took Jardiance when prescribed it, intermittent compliance with spironolactone, was not taking metoprolol prior to hospitalization. Started this after discharge. Hasn't been taking losartan since the autumn. Unsure how much she's drinking, thinks diet is adequate. Denies excess sodium consumption. Reports that she experienced SOB when she was volume overloaded, but denies any symptoms consistent with that since discharge.    Reports that her mother had heart failure starting in her 40s, no known history of CAD. No family history sudden cardiac death. Recently quit smoking. Has been drinking approximately 2 drinks (rum and coke) per day for many years, denies alcohol use since discharge.    4/27/23: presents today for follow  up. Feeling great, no SOB/JACKSON. No chest pain, palpitations, dizziness. No LE edema, PND, orthopnea. Compliant with medications - started the spironolactone as directed after last visit. Felt much better after taking low dose Lasix x 3 days after last visit. Weights have been stable (138, 139 lbs). Trying to stay away from salt, drinking 32-48 oz of liquid per day - mix of water and iced tea. Urinating well - urinated a lot when taking Lasix. Amenable to restarting/taking recommended GDMT going forward.    5/17/23: presents today for follow up. Feeling good. Did not tolerate taking Jardiance; felt dizzy, nauseous, lightheaded. Symptoms started within 1-2 days of starting the medication and completely resolved after stopping it. Otherwise denies SOB, PND, orthopnea, LE swelling, abdominal distension. Weights have been stable. Has been staying within 2L fluid restriction, doing her best with salt. Cardiac MRI scheduled for next month. Follow up with [facility] cardiologist scheduled for next month. Has not needed to take any additional Lasix.     2/7/23: presents today for follow up. Saw Dr. Navarro in 6/2023 and started Entrestro, but experienced significant lightheadedness and felt unwell while taking it, so stopped it.  Has not yet gotten her cardiac MRI as ordered.      Presents today for an acute visit; she reports that she noticed confusion/brain fog and trouble speaking approximately 10 days ago in the late afternoon of 1/28/2024.  Her daughter is present today and reports that she also noticed the confusion and trouble verbalizing, but thought that this was due to alcohol as patient drinks regularly.  She woke up the next morning and noticed weakness in her left hand, which has significantly improved but has not completely resolved at this time.  She did not seek medical attention at the time.  Also noted a right-sided headache at the time, now resolved.  Denies vision changes, facial droop, or other areas of  focal weakness.  Denies any recent trauma, falls, syncope.    Has no cardiac complaints; denies chest pain, shortness of breath, lower extremity swelling, weight gain.  Has not been weighing herself regularly and has not been tracking her salt and fluid intake closely.  Has not needed to take Lasix.  Has been taking metoprolol and spironolactone.    Past Medical History:   Diagnosis Date    CHF (congestive heart failure) (HCC)     Hypertension      Review of Systems   Constitutional:  Negative for chills, fever and unexpected weight change.   HENT:  Negative for ear pain and sore throat.    Eyes:  Negative for pain and visual disturbance.   Respiratory:  Negative for cough and shortness of breath.    Cardiovascular:  Negative for chest pain, palpitations and leg swelling.   Gastrointestinal:  Negative for abdominal pain and vomiting.   Genitourinary:  Negative for dysuria and hematuria.   Musculoskeletal:  Negative for arthralgias and back pain.   Skin:  Negative for color change and rash.   Neurological:  Positive for weakness. Negative for seizures and syncope.   All other systems reviewed and are negative.  14-point ROS completed and negative except as stated above and/or in the HPI.      Allergies   Allergen Reactions    Codeine GI Intolerance and Vomiting    Oxycodone-Acetaminophen GI Intolerance and Vomiting    Propoxyphene Vomiting       Current Outpatient Medications:     ALPRAZolam (XANAX) 0.25 mg tablet, Take 1 tablet (0.25 mg total) by mouth once as needed for anxiety (for anxiety/claustrophobia (take one, additonal one as needed)) for up to 2 doses Do not drive after taking this medication., Disp: 2 tablet, Rfl: 0    losartan (COZAAR) 25 mg tablet, Take 0.5 tablets (12.5 mg total) by mouth daily, Disp: 30 tablet, Rfl: 2    metoprolol succinate (TOPROL-XL) 50 mg 24 hr tablet, Take 1 tablet (50 mg total) by mouth daily, Disp: 90 tablet, Rfl: 3    spironolactone (ALDACTONE) 25 mg tablet, Take 1 tablet (25  mg total) by mouth daily, Disp: 90 tablet, Rfl: 3    furosemide (LASIX) 20 mg tablet, Take 1 tablet (20 mg total) by mouth daily Take 1 tablet (20 mg) for the next three days. (Patient not taking: Reported on 6/5/2023), Disp: 30 tablet, Rfl: 0    Social History     Socioeconomic History    Marital status: /Civil Union     Spouse name: Not on file    Number of children: Not on file    Years of education: Not on file    Highest education level: Not on file   Occupational History    Not on file   Tobacco Use    Smoking status: Some Days     Current packs/day: 0.50     Types: Cigarettes    Smokeless tobacco: Never   Vaping Use    Vaping status: Never Used   Substance and Sexual Activity    Alcohol use: Yes     Alcohol/week: 4.0 standard drinks of alcohol     Types: 4 Shots of liquor per week    Drug use: Yes     Types: Marijuana    Sexual activity: Yes     Partners: Male   Other Topics Concern    Not on file   Social History Narrative    Not on file     Social Determinants of Health     Financial Resource Strain: Not on file   Food Insecurity: No Food Insecurity (4/6/2023)    Hunger Vital Sign     Worried About Running Out of Food in the Last Year: Never true     Ran Out of Food in the Last Year: Never true   Transportation Needs: No Transportation Needs (4/6/2023)    PRAPARE - Transportation     Lack of Transportation (Medical): No     Lack of Transportation (Non-Medical): No   Physical Activity: Not on file   Stress: Not on file   Social Connections: Not on file   Intimate Partner Violence: Not on file   Housing Stability: Low Risk  (4/6/2023)    Housing Stability Vital Sign     Unable to Pay for Housing in the Last Year: No     Number of Places Lived in the Last Year: 1     Unstable Housing in the Last Year: No     Family History   Problem Relation Age of Onset    Diabetes Mother     COPD Mother     Hyperlipidemia Mother     Stroke Mother     Lung cancer Father        Vitals:  Blood pressure 156/98, pulse 94,  "height 5' 2\" (1.575 m), weight 61.2 kg (135 lb), SpO2 99%, currently breastfeeding.  Body mass index is 24.69 kg/m².  Wt Readings from Last 10 Encounters:   02/07/24 61.2 kg (135 lb)   06/05/23 60.3 kg (133 lb)   05/17/23 61.2 kg (135 lb)   05/08/23 63.5 kg (140 lb)   04/27/23 64.4 kg (142 lb)   04/13/23 63.1 kg (139 lb 3.2 oz)   04/07/23 62.8 kg (138 lb 7.2 oz)   11/28/22 60.3 kg (133 lb)   09/13/22 60.5 kg (133 lb 6.4 oz)   09/08/22 59.4 kg (131 lb)     Vitals:    02/07/24 0949   BP: 156/98   BP Location: Right arm   Patient Position: Sitting   Cuff Size: Standard   Pulse: 94   SpO2: 99%   Weight: 61.2 kg (135 lb)   Height: 5' 2\" (1.575 m)         Physical Exam  Constitutional:       Appearance: Normal appearance.   HENT:      Head: Normocephalic.      Nose: Nose normal.      Mouth/Throat:      Mouth: Mucous membranes are moist.   Eyes:      Conjunctiva/sclera: Conjunctivae normal.   Neck:      Vascular: No JVD.   Cardiovascular:      Rate and Rhythm: Normal rate and regular rhythm.      Heart sounds: Murmur heard.   Pulmonary:      Effort: Pulmonary effort is normal.      Breath sounds: Normal breath sounds.   Musculoskeletal:      Cervical back: Neck supple.      Right lower leg: No edema.      Left lower leg: No edema.   Skin:     General: Skin is warm and dry.   Neurological:      General: No focal deficit present.      Mental Status: She is alert and oriented to person, place, and time.      Cranial Nerves: No facial asymmetry.      Sensory: Sensation is intact.      Motor: Weakness (Trace weakness distal LUE noted) present. No pronator drift.   Psychiatric:         Mood and Affect: Mood normal.         Behavior: Behavior normal.         Labs & Results:  Lab Results   Component Value Date    WBC 11.42 (H) 04/05/2023    HGB 12.9 04/05/2023    HCT 38.9 04/05/2023    MCV 95 04/05/2023     (H) 04/05/2023     Lab Results   Component Value Date    SODIUM 136 04/14/2023    K 5.0 04/14/2023     " "04/14/2023    CO2 24 04/14/2023    BUN 29 (H) 04/14/2023    CREATININE 1.36 (H) 04/14/2023    GLUC 118 04/14/2023    CALCIUM 9.6 04/14/2023     No results found for: \"INR\", \"PROTIME\"  Lab Results   Component Value Date    BNP 2,883 (H) 04/04/2023      Lab Results   Component Value Date    NTBNP 9,840 (H) 06/02/2022        Thank you for the opportunity to participate in the care of this patient.    Yue Aparicio PA-C   "

## 2024-02-07 ENCOUNTER — HOSPITAL ENCOUNTER (INPATIENT)
Facility: HOSPITAL | Age: 64
LOS: 1 days | Discharge: HOME | DRG: 045 | End: 2024-02-10
Attending: EMERGENCY MEDICINE | Admitting: INTERNAL MEDICINE
Payer: MEDICARE

## 2024-02-07 ENCOUNTER — OFFICE VISIT (OUTPATIENT)
Dept: CARDIOLOGY CLINIC | Facility: CLINIC | Age: 64
End: 2024-02-07
Payer: MEDICARE

## 2024-02-07 ENCOUNTER — HOSPITAL ENCOUNTER (OUTPATIENT)
Dept: MRI IMAGING | Facility: HOSPITAL | Age: 64
Discharge: HOME | End: 2024-02-07
Payer: MEDICARE

## 2024-02-07 ENCOUNTER — TELEPHONE (OUTPATIENT)
Dept: CARDIOLOGY CLINIC | Facility: CLINIC | Age: 64
End: 2024-02-07

## 2024-02-07 VITALS
BODY MASS INDEX: 24.84 KG/M2 | WEIGHT: 135 LBS | HEIGHT: 62 IN | SYSTOLIC BLOOD PRESSURE: 156 MMHG | OXYGEN SATURATION: 99 % | DIASTOLIC BLOOD PRESSURE: 98 MMHG | HEART RATE: 94 BPM

## 2024-02-07 DIAGNOSIS — I50.23 ACUTE ON CHRONIC SYSTOLIC HEART FAILURE (HCC): ICD-10-CM

## 2024-02-07 DIAGNOSIS — I50.22 CHRONIC SYSTOLIC CONGESTIVE HEART FAILURE (HCC): ICD-10-CM

## 2024-02-07 DIAGNOSIS — I63.9 ACUTE CVA (CEREBROVASCULAR ACCIDENT) (HCC): Primary | ICD-10-CM

## 2024-02-07 DIAGNOSIS — I63.9 CVA (CEREBRAL VASCULAR ACCIDENT) (HCC): Primary | ICD-10-CM

## 2024-02-07 DIAGNOSIS — I10 HYPERTENSION: ICD-10-CM

## 2024-02-07 DIAGNOSIS — R29.898 WEAKNESS OF LEFT HAND: Primary | ICD-10-CM

## 2024-02-07 DIAGNOSIS — I42.8 NON-ISCHEMIC CARDIOMYOPATHY (HCC): ICD-10-CM

## 2024-02-07 DIAGNOSIS — R09.89 SUSPECTED STROKE PATIENT LAST KNOWN TO BE WELL MORE THAN 2 HOURS AGO: ICD-10-CM

## 2024-02-07 DIAGNOSIS — I50.22 CHRONIC SYSTOLIC HEART FAILURE (HCC): ICD-10-CM

## 2024-02-07 DIAGNOSIS — J43.8 OTHER EMPHYSEMA (HCC): ICD-10-CM

## 2024-02-07 DIAGNOSIS — Q21.12 PFO (PATENT FORAMEN OVALE): ICD-10-CM

## 2024-02-07 LAB — GLUCOSE SERPL-MCNC: 143 MG/DL (ref 65–140)

## 2024-02-07 PROCEDURE — 82948 REAGENT STRIP/BLOOD GLUCOSE: CPT

## 2024-02-07 PROCEDURE — 99214 OFFICE O/P EST MOD 30 MIN: CPT | Performed by: PHYSICIAN ASSISTANT

## 2024-02-07 PROCEDURE — G1004 CDSM NDSC: HCPCS

## 2024-02-07 PROCEDURE — 99291 CRITICAL CARE FIRST HOUR: CPT | Performed by: EMERGENCY MEDICINE

## 2024-02-07 PROCEDURE — 99285 EMERGENCY DEPT VISIT HI MDM: CPT

## 2024-02-07 PROCEDURE — 93005 ELECTROCARDIOGRAM TRACING: CPT

## 2024-02-07 PROCEDURE — 70551 MRI BRAIN STEM W/O DYE: CPT

## 2024-02-07 RX ORDER — LOSARTAN POTASSIUM 25 MG/1
12.5 TABLET ORAL DAILY
Qty: 30 TABLET | Refills: 2 | Status: ON HOLD | OUTPATIENT
Start: 2024-02-07 | End: 2024-02-10

## 2024-02-07 RX ORDER — HEPARIN SODIUM 10000 [USP'U]/100ML
3-24 INJECTION, SOLUTION INTRAVENOUS
Status: DISCONTINUED | OUTPATIENT
Start: 2024-02-07 | End: 2024-02-10

## 2024-02-07 RX ORDER — ALPRAZOLAM 0.25 MG/1
0.25 TABLET ORAL ONCE AS NEEDED
Qty: 2 TABLET | Refills: 0 | Status: SHIPPED | OUTPATIENT
Start: 2024-02-07

## 2024-02-07 NOTE — TELEPHONE ENCOUNTER
MRI results received as on call coverage.    No answer on cell. Left message.  Call to house line. Dicussed with daughter who brought to MRI today.  Reviewed findings of acute and subacute stroke.  Reiterated recommendation from earlier today re: ER [facility].  She will bring patient to [place].

## 2024-02-07 NOTE — PATIENT INSTRUCTIONS
Schedule your MRI as soon as possible.  Schedule your echo (heart ultrasound) ASAP as well.  Get labs as ordered.  Make an appointment with neurology.   Please schedule your cardiac MRI.    Please weigh yourself every day (after emptying your bladder) and keep a detailed log of weights.   Contact the Heart Failure program at [phone] if you gain 3+ lbs overnight or 5+ lbs in 5-7 days.  Limit daily sodium/salt intake to 2000 mg daily to prevent fluid retention.  Avoid canned foods, fast food/Chinese food, and processed meats (hot dogs, lunch meat, and sausage etc.). Caution with condiments.  Limit fluid intake to 2000 mL or 2 liters (about 60-65 ounces) daily.  Avoid electrolyte replacement drinks (such as Gatorade, Pedialyte, Propel, Liquid IV, etc.).  Bring complete list of medications and log of daily weights to your follow-up appointment.

## 2024-02-08 ENCOUNTER — ANESTHESIA EVENT (OUTPATIENT)
Dept: NON INVASIVE DIAGNOSTICS | Facility: HOSPITAL | Age: 64
DRG: 045 | End: 2024-02-08
Payer: MEDICARE

## 2024-02-08 PROBLEM — I50.22 CHRONIC SYSTOLIC HEART FAILURE (HCC): Status: ACTIVE | Noted: 2023-04-04

## 2024-02-08 PROBLEM — I63.9 CVA (CEREBRAL VASCULAR ACCIDENT) (HCC): Status: ACTIVE | Noted: 2024-02-08

## 2024-02-08 PROBLEM — S71.001A OPEN WOUND OF RIGHT HIP: Status: RESOLVED | Noted: 2020-09-29 | Resolved: 2024-02-08

## 2024-02-08 PROBLEM — J43.8 OTHER EMPHYSEMA (HCC): Status: ACTIVE | Noted: 2022-06-04

## 2024-02-08 PROBLEM — Z86.2 HISTORY OF LEUKOCYTOSIS: Chronic | Status: RESOLVED | Noted: 2022-06-03 | Resolved: 2024-02-08

## 2024-02-08 PROBLEM — F17.210 SMOKES CIGARETTES: Chronic | Status: RESOLVED | Noted: 2022-06-03 | Resolved: 2024-02-08

## 2024-02-08 LAB
ABO GROUP BLD: NORMAL
ABO GROUP BLD: NORMAL
ALBUMIN SERPL BCP-MCNC: 4.1 G/DL (ref 3.5–5)
ALP SERPL-CCNC: 136 U/L (ref 34–104)
ALT SERPL W P-5'-P-CCNC: 35 U/L (ref 7–52)
ANION GAP SERPL CALCULATED.3IONS-SCNC: 6 MMOL/L
ANION GAP SERPL CALCULATED.3IONS-SCNC: 7 MMOL/L
AORTIC ROOT: 3 CM
AORTIC VALVE MEAN VELOCITY: 8.5 M/S
APICAL FOUR CHAMBER EJECTION FRACTION: 65 %
APTT PPP: 29 SECONDS (ref 23–37)
APTT PPP: 49 SECONDS (ref 23–37)
APTT PPP: 52 SECONDS (ref 23–37)
APTT PPP: 61 SECONDS (ref 23–37)
ASCENDING AORTA: 2.9 CM
AST SERPL W P-5'-P-CCNC: 43 U/L (ref 13–39)
ATRIAL RATE: 95 BPM
AV AREA BY CONTINUOUS VTI: 2.4 CM2
AV AREA PEAK VELOCITY: 3 CM2
AV LVOT MEAN GRADIENT: 1 MMHG
AV LVOT PEAK GRADIENT: 3 MMHG
AV MEAN GRADIENT: 3 MMHG
AV PEAK GRADIENT: 7 MMHG
AV VALVE AREA: 2.42 CM2
AV VELOCITY RATIO: 0.66
BASOPHILS # BLD AUTO: 0.14 THOUSANDS/ÂΜL (ref 0–0.1)
BASOPHILS NFR BLD AUTO: 1 % (ref 0–1)
BILIRUB SERPL-MCNC: 0.24 MG/DL (ref 0.2–1)
BLD GP AB SCN SERPL QL: NEGATIVE
BSA FOR ECHO PROCEDURE: 1.64 M2
BUN SERPL-MCNC: 26 MG/DL (ref 5–25)
BUN SERPL-MCNC: 27 MG/DL (ref 5–25)
CALCIUM SERPL-MCNC: 8.7 MG/DL (ref 8.4–10.2)
CALCIUM SERPL-MCNC: 9 MG/DL (ref 8.4–10.2)
CHLORIDE SERPL-SCNC: 105 MMOL/L (ref 96–108)
CHLORIDE SERPL-SCNC: 109 MMOL/L (ref 96–108)
CHOLEST SERPL-MCNC: 159 MG/DL
CO2 SERPL-SCNC: 22 MMOL/L (ref 21–32)
CO2 SERPL-SCNC: 26 MMOL/L (ref 21–32)
CREAT SERPL-MCNC: 1.3 MG/DL (ref 0.6–1.3)
CREAT SERPL-MCNC: 1.58 MG/DL (ref 0.6–1.3)
DOP CALC AO PEAK VEL: 1.32 M/S
DOP CALC AO VTI: 24.62 CM
DOP CALC LVOT AREA: 4.52 CM2
DOP CALC LVOT CARDIAC INDEX: 2.73 L/MIN/M2
DOP CALC LVOT CARDIAC OUTPUT: 4.48 L/MIN
DOP CALC LVOT DIAMETER: 2.4 CM
DOP CALC LVOT PEAK VEL VTI: 13.19 CM
DOP CALC LVOT PEAK VEL: 0.87 M/S
DOP CALC LVOT STROKE INDEX: 36 ML/M2
DOP CALC LVOT STROKE VOLUME: 59.64
E WAVE DECELERATION TIME: 144 MS
E/A RATIO: 0.87
EOSINOPHIL # BLD AUTO: 0.25 THOUSAND/ÂΜL (ref 0–0.61)
EOSINOPHIL NFR BLD AUTO: 2 % (ref 0–6)
ERYTHROCYTE [DISTWIDTH] IN BLOOD BY AUTOMATED COUNT: 14.6 % (ref 11.6–15.1)
ERYTHROCYTE [DISTWIDTH] IN BLOOD BY AUTOMATED COUNT: 14.9 % (ref 11.6–15.1)
EST. AVERAGE GLUCOSE BLD GHB EST-MCNC: 105 MG/DL
FRACTIONAL SHORTENING: 22 (ref 28–44)
GFR SERPL CREATININE-BSD FRML MDRD: 34 ML/MIN/1.73SQ M
GFR SERPL CREATININE-BSD FRML MDRD: 43 ML/MIN/1.73SQ M
GLUCOSE P FAST SERPL-MCNC: 88 MG/DL (ref 65–99)
GLUCOSE SERPL-MCNC: 88 MG/DL (ref 65–140)
GLUCOSE SERPL-MCNC: 94 MG/DL (ref 65–140)
HBA1C MFR BLD: 5.3 %
HCT VFR BLD AUTO: 42.4 % (ref 34.8–46.1)
HCT VFR BLD AUTO: 43 % (ref 34.8–46.1)
HDLC SERPL-MCNC: 51 MG/DL
HGB BLD-MCNC: 14.4 G/DL (ref 11.5–15.4)
HGB BLD-MCNC: 14.7 G/DL (ref 11.5–15.4)
IMM GRANULOCYTES # BLD AUTO: 0.08 THOUSAND/UL (ref 0–0.2)
IMM GRANULOCYTES NFR BLD AUTO: 1 % (ref 0–2)
INR PPP: 1.09 (ref 0.84–1.19)
INTERVENTRICULAR SEPTUM IN DIASTOLE (PARASTERNAL SHORT AXIS VIEW): 1.2 CM
INTERVENTRICULAR SEPTUM: 1.2 CM (ref 0.6–1.1)
LAAS-AP2: 17.1 CM2
LAAS-AP4: 26.1 CM2
LDLC SERPL CALC-MCNC: 96 MG/DL (ref 0–100)
LEFT ATRIUM SIZE: 3.4 CM
LEFT ATRIUM VOLUME (MOD BIPLANE): 75 ML
LEFT ATRIUM VOLUME INDEX (MOD BIPLANE): 45.7 ML/M2
LEFT INTERNAL DIMENSION IN SYSTOLE: 3.6 CM (ref 2.1–4)
LEFT VENTRICULAR INTERNAL DIMENSION IN DIASTOLE: 4.6 CM (ref 3.5–6)
LEFT VENTRICULAR POSTERIOR WALL IN END DIASTOLE: 0.7 CM
LEFT VENTRICULAR STROKE VOLUME: 41 ML
LVSV (TEICH): 41 ML
LYMPHOCYTES # BLD AUTO: 2.48 THOUSANDS/ÂΜL (ref 0.6–4.47)
LYMPHOCYTES NFR BLD AUTO: 23 % (ref 14–44)
MCH RBC QN AUTO: 34.1 PG (ref 26.8–34.3)
MCH RBC QN AUTO: 34.6 PG (ref 26.8–34.3)
MCHC RBC AUTO-ENTMCNC: 34 G/DL (ref 31.4–37.4)
MCHC RBC AUTO-ENTMCNC: 34.2 G/DL (ref 31.4–37.4)
MCV RBC AUTO: 100 FL (ref 82–98)
MCV RBC AUTO: 102 FL (ref 82–98)
MONOCYTES # BLD AUTO: 1.21 THOUSAND/ÂΜL (ref 0.17–1.22)
MONOCYTES NFR BLD AUTO: 11 % (ref 4–12)
MV E'TISSUE VEL-LAT: 6 CM/S
MV E'TISSUE VEL-SEP: 8 CM/S
MV PEAK A VEL: 0.77 M/S
MV PEAK E VEL: 67 CM/S
MV STENOSIS PRESSURE HALF TIME: 42 MS
MV VALVE AREA P 1/2 METHOD: 5.24
NEUTROPHILS # BLD AUTO: 6.57 THOUSANDS/ÂΜL (ref 1.85–7.62)
NEUTS SEG NFR BLD AUTO: 62 % (ref 43–75)
NRBC BLD AUTO-RTO: 0 /100 WBCS
P AXIS: 53 DEGREES
PLATELET # BLD AUTO: 463 THOUSANDS/UL (ref 149–390)
PLATELET # BLD AUTO: 622 THOUSANDS/UL (ref 149–390)
PMV BLD AUTO: 10.4 FL (ref 8.9–12.7)
PMV BLD AUTO: 9.9 FL (ref 8.9–12.7)
POTASSIUM SERPL-SCNC: 3.8 MMOL/L (ref 3.5–5.3)
POTASSIUM SERPL-SCNC: 4.4 MMOL/L (ref 3.5–5.3)
PR INTERVAL: 120 MS
PROT SERPL-MCNC: 7.3 G/DL (ref 6.4–8.4)
PROTHROMBIN TIME: 14.3 SECONDS (ref 11.6–14.5)
QRS AXIS: 64 DEGREES
QRSD INTERVAL: 82 MS
QT INTERVAL: 370 MS
QTC INTERVAL: 464 MS
RBC # BLD AUTO: 4.16 MILLION/UL (ref 3.81–5.12)
RBC # BLD AUTO: 4.31 MILLION/UL (ref 3.81–5.12)
RH BLD: NEGATIVE
RH BLD: NEGATIVE
RIGHT ATRIAL 2D VOLUME: 25 ML
RIGHT ATRIUM AREA SYSTOLE A4C: 11.7 CM2
RIGHT VENTRICLE ID DIMENSION: 2.8 CM
SL CV LEFT ATRIUM LENGTH A2C: 4.8 CM
SL CV LV EF: 55
SL CV PED ECHO LEFT VENTRICLE DIASTOLIC VOLUME (MOD BIPLANE) 2D: 97 ML
SL CV PED ECHO LEFT VENTRICLE SYSTOLIC VOLUME (MOD BIPLANE) 2D: 56 ML
SODIUM SERPL-SCNC: 137 MMOL/L (ref 135–147)
SODIUM SERPL-SCNC: 138 MMOL/L (ref 135–147)
SPECIMEN EXPIRATION DATE: NORMAL
T WAVE AXIS: -65 DEGREES
TR MAX PG: 22 MMHG
TR PEAK VELOCITY: 2.3 M/S
TRICUSPID ANNULAR PLANE SYSTOLIC EXCURSION: 2.8 CM
TRICUSPID VALVE PEAK REGURGITATION VELOCITY: 2.34 M/S
TRIGL SERPL-MCNC: 61 MG/DL
VENTRICULAR RATE: 95 BPM
WBC # BLD AUTO: 10.47 THOUSAND/UL (ref 4.31–10.16)
WBC # BLD AUTO: 10.73 THOUSAND/UL (ref 4.31–10.16)

## 2024-02-08 PROCEDURE — 93010 ELECTROCARDIOGRAM REPORT: CPT

## 2024-02-08 PROCEDURE — 80048 BASIC METABOLIC PNL TOTAL CA: CPT | Performed by: HOSPITALIST

## 2024-02-08 PROCEDURE — 36415 COLL VENOUS BLD VENIPUNCTURE: CPT | Performed by: EMERGENCY MEDICINE

## 2024-02-08 PROCEDURE — 86901 BLOOD TYPING SEROLOGIC RH(D): CPT | Performed by: EMERGENCY MEDICINE

## 2024-02-08 PROCEDURE — 86850 RBC ANTIBODY SCREEN: CPT | Performed by: EMERGENCY MEDICINE

## 2024-02-08 PROCEDURE — 93880 EXTRACRANIAL BILAT STUDY: CPT | Performed by: SURGERY

## 2024-02-08 PROCEDURE — 93880 EXTRACRANIAL BILAT STUDY: CPT

## 2024-02-08 PROCEDURE — 85730 THROMBOPLASTIN TIME PARTIAL: CPT | Performed by: EMERGENCY MEDICINE

## 2024-02-08 PROCEDURE — 85730 THROMBOPLASTIN TIME PARTIAL: CPT | Performed by: HOSPITALIST

## 2024-02-08 PROCEDURE — 99255 IP/OBS CONSLTJ NEW/EST HI 80: CPT | Performed by: PSYCHIATRY & NEUROLOGY

## 2024-02-08 PROCEDURE — 93306 TTE W/DOPPLER COMPLETE: CPT | Performed by: INTERNAL MEDICINE

## 2024-02-08 PROCEDURE — 85027 COMPLETE CBC AUTOMATED: CPT | Performed by: HOSPITALIST

## 2024-02-08 PROCEDURE — 99223 1ST HOSP IP/OBS HIGH 75: CPT

## 2024-02-08 PROCEDURE — 99215 OFFICE O/P EST HI 40 MIN: CPT | Performed by: STUDENT IN AN ORGANIZED HEALTH CARE EDUCATION/TRAINING PROGRAM

## 2024-02-08 PROCEDURE — 85025 COMPLETE CBC W/AUTO DIFF WBC: CPT | Performed by: EMERGENCY MEDICINE

## 2024-02-08 PROCEDURE — 93306 TTE W/DOPPLER COMPLETE: CPT

## 2024-02-08 PROCEDURE — 80061 LIPID PANEL: CPT | Performed by: HOSPITALIST

## 2024-02-08 PROCEDURE — 86900 BLOOD TYPING SEROLOGIC ABO: CPT | Performed by: EMERGENCY MEDICINE

## 2024-02-08 PROCEDURE — 85610 PROTHROMBIN TIME: CPT | Performed by: EMERGENCY MEDICINE

## 2024-02-08 PROCEDURE — 93356 MYOCRD STRAIN IMG SPCKL TRCK: CPT | Performed by: INTERNAL MEDICINE

## 2024-02-08 PROCEDURE — 83036 HEMOGLOBIN GLYCOSYLATED A1C: CPT | Performed by: HOSPITALIST

## 2024-02-08 PROCEDURE — 93356 MYOCRD STRAIN IMG SPCKL TRCK: CPT

## 2024-02-08 PROCEDURE — 85730 THROMBOPLASTIN TIME PARTIAL: CPT | Performed by: INTERNAL MEDICINE

## 2024-02-08 PROCEDURE — 80053 COMPREHEN METABOLIC PANEL: CPT | Performed by: EMERGENCY MEDICINE

## 2024-02-08 RX ORDER — LANOLIN ALCOHOL/MO/W.PET/CERES
6 CREAM (GRAM) TOPICAL
Status: DISCONTINUED | OUTPATIENT
Start: 2024-02-08 | End: 2024-02-10 | Stop reason: HOSPADM

## 2024-02-08 RX ORDER — MAGNESIUM HYDROXIDE/ALUMINUM HYDROXICE/SIMETHICONE 120; 1200; 1200 MG/30ML; MG/30ML; MG/30ML
30 SUSPENSION ORAL EVERY 6 HOURS PRN
Status: DISCONTINUED | OUTPATIENT
Start: 2024-02-08 | End: 2024-02-10 | Stop reason: HOSPADM

## 2024-02-08 RX ORDER — SPIRONOLACTONE 25 MG/1
25 TABLET ORAL DAILY
Status: DISCONTINUED | OUTPATIENT
Start: 2024-02-08 | End: 2024-02-10 | Stop reason: HOSPADM

## 2024-02-08 RX ORDER — HYDRALAZINE HYDROCHLORIDE 20 MG/ML
5 INJECTION INTRAMUSCULAR; INTRAVENOUS EVERY 6 HOURS PRN
Status: DISCONTINUED | OUTPATIENT
Start: 2024-02-08 | End: 2024-02-10 | Stop reason: HOSPADM

## 2024-02-08 RX ORDER — METOPROLOL SUCCINATE 50 MG/1
50 TABLET, EXTENDED RELEASE ORAL DAILY
Status: DISCONTINUED | OUTPATIENT
Start: 2024-02-08 | End: 2024-02-10 | Stop reason: HOSPADM

## 2024-02-08 RX ORDER — ASPIRIN 81 MG/1
81 TABLET, CHEWABLE ORAL DAILY
Status: DISCONTINUED | OUTPATIENT
Start: 2024-02-08 | End: 2024-02-10 | Stop reason: HOSPADM

## 2024-02-08 RX ORDER — POLYETHYLENE GLYCOL 3350 17 G/17G
17 POWDER, FOR SOLUTION ORAL DAILY PRN
Status: DISCONTINUED | OUTPATIENT
Start: 2024-02-08 | End: 2024-02-10 | Stop reason: HOSPADM

## 2024-02-08 RX ORDER — ATORVASTATIN CALCIUM 40 MG/1
40 TABLET, FILM COATED ORAL EVERY EVENING
Status: DISCONTINUED | OUTPATIENT
Start: 2024-02-08 | End: 2024-02-10 | Stop reason: HOSPADM

## 2024-02-08 RX ORDER — ALPRAZOLAM 0.25 MG/1
0.25 TABLET ORAL ONCE AS NEEDED
Status: DISCONTINUED | OUTPATIENT
Start: 2024-02-08 | End: 2024-02-08

## 2024-02-08 RX ORDER — LOSARTAN POTASSIUM 25 MG/1
12.5 TABLET ORAL DAILY
Status: DISCONTINUED | OUTPATIENT
Start: 2024-02-08 | End: 2024-02-09

## 2024-02-08 RX ORDER — ONDANSETRON 2 MG/ML
4 INJECTION INTRAMUSCULAR; INTRAVENOUS EVERY 6 HOURS PRN
Status: DISCONTINUED | OUTPATIENT
Start: 2024-02-08 | End: 2024-02-10 | Stop reason: HOSPADM

## 2024-02-08 RX ORDER — ACETAMINOPHEN 325 MG/1
650 TABLET ORAL EVERY 4 HOURS PRN
Status: DISCONTINUED | OUTPATIENT
Start: 2024-02-08 | End: 2024-02-10 | Stop reason: HOSPADM

## 2024-02-08 RX ADMIN — HYDRALAZINE HYDROCHLORIDE 5 MG: 20 INJECTION, SOLUTION INTRAMUSCULAR; INTRAVENOUS at 04:26

## 2024-02-08 RX ADMIN — SPIRONOLACTONE 25 MG: 25 TABLET, FILM COATED ORAL at 08:27

## 2024-02-08 RX ADMIN — METOPROLOL SUCCINATE 50 MG: 50 TABLET, EXTENDED RELEASE ORAL at 08:27

## 2024-02-08 RX ADMIN — ASPIRIN 81 MG CHEWABLE TABLET 81 MG: 81 TABLET CHEWABLE at 08:26

## 2024-02-08 RX ADMIN — HEPARIN SODIUM 15 UNITS/KG/HR: 10000 INJECTION, SOLUTION INTRAVENOUS at 00:43

## 2024-02-08 RX ADMIN — LOSARTAN POTASSIUM 12.5 MG: 25 TABLET, FILM COATED ORAL at 08:26

## 2024-02-08 RX ADMIN — Medication 6 MG: at 22:25

## 2024-02-08 RX ADMIN — ATORVASTATIN CALCIUM 40 MG: 40 TABLET, FILM COATED ORAL at 17:20

## 2024-02-08 NOTE — ASSESSMENT & PLAN NOTE
Wt Readings from Last 3 Encounters:   02/07/24 63.1 kg (139 lb 1.8 oz)   02/07/24 61.2 kg (135 lb)   06/05/23 60.3 kg (133 lb)     Nonischemic, chronic daily alcohol use, questionable amyloid. Follows with [facility] heart failure group. Appears compensated   TTE 04/2023 reviewed: LVEF 28% severely reduced, global longitudinal strain with an apical sparing bulls-eye pattern, severe global hypokinesis, diastolic dysfunction severely abnormal, severe biatrial dilation, patent foramen ovale with left to R shunting, mild to moderate MR, mild to moderate TR, PASP 49mmHg   Dry Weight: ~135-139lbs    Plan:  Continue PTA losartan 12.5mg, metoprolol succinate 50mg daily, spironolactone 25mg daily   Monitor daily weight, I/Os, cardiac diet (sodium/fluid restriction), monitor K>4, Mg>2

## 2024-02-08 NOTE — CONSULTS
Consultation - Neurology   Ellie Lazo 63 y.o. female MRN: [id number]  Unit/Bed#: E4 -01 Encounter: [id number]      Assessment/Plan   * CVA (cerebral vascular accident) (HCC)  Assessment & Plan  60-year-old female with past medical history of hypertension, cardiomyopathy, PFO, history of tobacco abuse, COPD, and alcohol use.  Patient was seen by her cardiologist and MRI was ordered secondary to 10 days prior of an episode of confusion, difficulty speaking and left arm and hand weakness.  MRI revealed an acute to subacute cortical infarct in the right precentral gyrus and right parietal lobe.  The patient presented to [facility] for further evaluation.    Acute/subacute cortical infarcts in the right precentral gyrus and right parietal lobe -suspect cardioembolic etiology cannot exclude vessel vessel atheroma  CHF - TTE 04/2023 reviewed: LVEF 28% severely reduced, global longitudinal strain with an apical sparing bulls-eye pattern, severe global hypokinesis, diastolic dysfunction severely abnormal, severe biatrial dilation, patent foramen ovale with left to R shunting, mild to moderate MR, mild to moderate TR  COPD  Alcohol use  Hypertension    - Stroke pathway  MRA of head and neck, gfr was 34  Echo  Lipid Panel  Hemoglobin A1c  Aspirin 81 mg   Started on acute stroke heparin drip, monitor APTT closely (reviewed with attending)  Atorvastatin 40 mg  Blood pressures 140-160 systolic, avoid hypotension for cerebral perfusion, as heart function can tolerate  Continue telemetry, monitor for afib  PT/OT/ST  Frequent neuro checks. Continue to monitor and notify neurology with any changes.   - Medical management and supportive care per primary team. Correction of any metabolic or infectious disturbances.        Ellie Lazo will need follow up in in 6 weeks with neurovascular attending or advance practitioner. She will not require outpatient neurological testing, at this time.      History of Present Illness    Reason for Consult / Principal Problem:   Stroke  HPI: Ellie Lazo is a 63 y.o. female with past medical history of hypertension, nonischemic cardiomyopathy (ef from echo in April was 28%), PFO, history of tobacco abuse reports quitting, COPD, alcohol use.  It is reported that the patient had an outpatient MRI completed on 2/7/24 -reviewed acute/subacute cortical infarcts in the right precentral gyrus and right parietal lobe. No significant edema, mass effect, or hemorrhagic transformation.  Chronic left gangliocapsular and corona radiata lacunar infarcts. Chronic right basal ganglia lacunar infarcts. Mild microangiopathic change.  The patient was at a cardiology appointment and it was expressed that 10 days prior she had an episode of confusion, difficulty speaking with with left arm and hand weakness.  It is reported that her symptoms resolved. Secondary to the abnormal MRI findings the patient was brought into the hospital for further evaluation.  The case was discussed with neurology overnight and it was recommended the patient be admitted on the stroke pathway and heparin drip be started with goal blood pressure less than 180 systolic.  There is no documented afib hx.     VS on arrival to the ED, the patient was htn with blood pressures in the 150-170 sytolic, she was afebrile.     Labs/imaging:  MRI of brain with contrast - Acute or subacute cortical infarcts in the right precentral gyrus and right parietal lobe. No significant edema, mass effect, or hemorrhagic transformation.  Chronic left gangliocapsular and corona radiata lacunar infarcts. Chronic right basal ganglia lacunar infarcts. Mild microangiopathic change.    CBC with a leukocytosis of 10.73H, platelets are 622H  CMP BUN 27, Cr. 1.58H, AST 43H, Alk phose was 136H  INR was normal  APTT was normal  LDL pending  A1c pending    The patient was seen by the medicine team, patient was made on the stroke pathway, not on any acute stroke heparin  drip as well starting aspirin and statin, with blood pressure goals of 140 to 160, echo pending.   The patient did not have a CTA completed, secondary to his Kidney function, reviewed with attending and will order a MRA of head and neck at this time. Cardiology was consulted as well.     The patient does reports about 10 days ago, she had confusion, slurred speech and left upper extremity weakness, this lasted for a few hours and resolved.  Currently she has no neurological complaints.  She had the MRI yesterday which revealed a stroke.  She came to the hospital secondary to the MRI findings.  No current complaints.     Inpatient consult to Neurology  Consult performed by: Aram Morales PA-C  Consult ordered by: Rojas Brenner PA-C        Review of Systems  12 point ROS as negative except for HPI.     Historical Information   Past Medical History:   Diagnosis Date    CHF (congestive heart failure) (HCC)     Hypertension      Past Surgical History:   Procedure Laterality Date    CARDIAC CATHETERIZATION N/A 6/6/2022    Procedure: Cardiac catheterization;  Surgeon: Donta Guardado MD;  Location: [facility];  Service: Cardiology    CERVICAL CONE BIOPSY      FL GUIDED NEEDLE PLAC BX/ASP/INJ  10/21/2020    HIP SURGERY      HIP SURGERY       Social History   Social History     Substance and Sexual Activity   Alcohol Use Yes    Alcohol/week: 4.0 standard drinks of alcohol    Types: 4 Shots of liquor per week     Social History     Substance and Sexual Activity   Drug Use Yes    Types: Marijuana     E-Cigarette/Vaping    E-Cigarette Use Never User      E-Cigarette/Vaping Substances    Nicotine No     THC No     CBD No     Flavoring No     Other No     Unknown No      Social History     Tobacco Use   Smoking Status Some Days    Current packs/day: 0.50    Types: Cigarettes   Smokeless Tobacco Never     Family History:   Family History   Problem Relation Age of Onset    Diabetes Mother     COPD Mother      Hyperlipidemia Mother     Stroke Mother     Lung cancer Father      Meds/Allergies   all current active meds have been reviewed, current meds:   Current Facility-Administered Medications   Medication Dose Route Frequency    acetaminophen (TYLENOL) tablet 650 mg  650 mg Oral Q4H PRN    aluminum-magnesium hydroxide-simethicone (MAALOX) oral suspension 30 mL  30 mL Oral Q6H PRN    aspirin chewable tablet 81 mg  81 mg Oral Daily    atorvastatin (LIPITOR) tablet 40 mg  40 mg Oral QPM    heparin (porcine) 25,000 units in 0.45% NaCl 250 mL infusion (premix)  3-24 Units/kg/hr (Order-Specific) Intravenous Titrated    hydrALAZINE (APRESOLINE) injection 5 mg  5 mg Intravenous Q6H PRN    losartan (COZAAR) tablet 12.5 mg  12.5 mg Oral Daily    metoprolol succinate (TOPROL-XL) 24 hr tablet 50 mg  50 mg Oral Daily    ondansetron (ZOFRAN) injection 4 mg  4 mg Intravenous Q6H PRN    polyethylene glycol (MIRALAX) packet 17 g  17 g Oral Daily PRN    spironolactone (ALDACTONE) tablet 25 mg  25 mg Oral Daily   , and PTA meds:   Prior to Admission Medications   Prescriptions Last Dose Informant Patient Reported? Taking?   ALPRAZolam (XANAX) 0.25 mg tablet   No Yes   Sig: Take 1 tablet (0.25 mg total) by mouth once as needed for anxiety (for anxiety/claustrophobia (take one, additonal one as needed)) for up to 2 doses Do not drive after taking this medication.   furosemide (LASIX) 20 mg tablet Not Taking Self No No   Sig: Take 1 tablet (20 mg total) by mouth daily Take 1 tablet (20 mg) for the next three days.   Patient not taking: Reported on 6/5/2023   losartan (COZAAR) 25 mg tablet   No Yes   Sig: Take 0.5 tablets (12.5 mg total) by mouth daily   metoprolol succinate (TOPROL-XL) 50 mg 24 hr tablet  Self No Yes   Sig: Take 1 tablet (50 mg total) by mouth daily   spironolactone (ALDACTONE) 25 mg tablet  Self No Yes   Sig: Take 1 tablet (25 mg total) by mouth daily      Facility-Administered Medications: None     Allergies   Allergen  "Reactions    Codeine GI Intolerance and Vomiting    Oxycodone-Acetaminophen GI Intolerance and Vomiting    Propoxyphene Vomiting     Objective   Vitals:Blood pressure 141/84, pulse 70, temperature 97.7 °F (36.5 °C), temperature source Temporal, resp. rate 18, height 5' 2\" (1.575 m), weight 63.2 kg (139 lb 5.3 oz), SpO2 95%, currently breastfeeding.,Body mass index is 25.48 kg/m².  No intake or output data in the 24 hours ending 02/08/24 1120    Invasive Devices:   Invasive Devices       Peripheral Intravenous Line  Duration             Peripheral IV 02/08/24 Right;Ventral (anterior) Forearm <1 day                  Vital Sign reviewed  Constitutional - in NAD  HEENT - NC/AT, no icterus noted, conjunctiva clear, oral mucosa free of exudate  Cardiac - No murmur noted, rate regular  Lungs - Clear to auscultation bilaterally, no wheezing noted.  Abdomen - Soft and non tender, non distended  Extremities - No edema noted  Skin - no rashes noted  Neurological  Mental status - the patient is awake alert oriented x 3, with no evidence of aphasia or dysarthria, patient is able to follow simple commands, is able to follow complex commands.  No para-phasic errors noted. She is able to read and repeat, recognize objects, she has good insight and is a good historian, normal concentration and attn.   Cranial nerves 2 through 12 are intact  Motor - 5/5 upper extremities and lower extremities, without drift, normal tone and bulk.  No drift in the upper or lowers, non focal motor examination.   No tremor or fixed deficit noted  Rapid movements are equal bilaterally  Sensation - non-focal to touch, or temperature.   Coordination - slight past pointing noted on the left upper compared to the right, no gross ataxia noted.   Reflexes are equal- 1 in the uppers and lowers bilaterally  Toes are equivocal bilaterally  No evidence of seizure activity, observed.      Lab Results:   Recent Results (from the past 24 hour(s))   Fingerstick " Glucose (POCT)    Collection Time: 02/07/24 10:14 PM   Result Value Ref Range    POC Glucose 143 (H) 65 - 140 mg/dl   ECG 12 lead    Collection Time: 02/07/24 10:26 PM   Result Value Ref Range    Ventricular Rate 95 BPM    Atrial Rate 95 BPM    CA Interval 120 ms    QRSD Interval 82 ms    QT Interval 370 ms    QTC Interval 464 ms    P Durand 53 degrees    QRS Axis 64 degrees    T Wave Axis -65 degrees   CBC and differential    Collection Time: 02/08/24 12:09 AM   Result Value Ref Range    WBC 10.73 (H) 4.31 - 10.16 Thousand/uL    RBC 4.31 3.81 - 5.12 Million/uL    Hemoglobin 14.7 11.5 - 15.4 g/dL    Hematocrit 43.0 34.8 - 46.1 %     (H) 82 - 98 fL    MCH 34.1 26.8 - 34.3 pg    MCHC 34.2 31.4 - 37.4 g/dL    RDW 14.6 11.6 - 15.1 %    MPV 9.9 8.9 - 12.7 fL    Platelets 622 (H) 149 - 390 Thousands/uL    nRBC 0 /100 WBCs    Neutrophils Relative 62 43 - 75 %    Immat GRANS % 1 0 - 2 %    Lymphocytes Relative 23 14 - 44 %    Monocytes Relative 11 4 - 12 %    Eosinophils Relative 2 0 - 6 %    Basophils Relative 1 0 - 1 %    Neutrophils Absolute 6.57 1.85 - 7.62 Thousands/µL    Immature Grans Absolute 0.08 0.00 - 0.20 Thousand/uL    Lymphocytes Absolute 2.48 0.60 - 4.47 Thousands/µL    Monocytes Absolute 1.21 0.17 - 1.22 Thousand/µL    Eosinophils Absolute 0.25 0.00 - 0.61 Thousand/µL    Basophils Absolute 0.14 (H) 0.00 - 0.10 Thousands/µL   Comprehensive metabolic panel    Collection Time: 02/08/24 12:09 AM   Result Value Ref Range    Sodium 138 135 - 147 mmol/L    Potassium 4.4 3.5 - 5.3 mmol/L    Chloride 105 96 - 108 mmol/L    CO2 26 21 - 32 mmol/L    ANION GAP 7 mmol/L    BUN 27 (H) 5 - 25 mg/dL    Creatinine 1.58 (H) 0.60 - 1.30 mg/dL    Glucose 94 65 - 140 mg/dL    Calcium 9.0 8.4 - 10.2 mg/dL    AST 43 (H) 13 - 39 U/L    ALT 35 7 - 52 U/L    Alkaline Phosphatase 136 (H) 34 - 104 U/L    Total Protein 7.3 6.4 - 8.4 g/dL    Albumin 4.1 3.5 - 5.0 g/dL    Total Bilirubin 0.24 0.20 - 1.00 mg/dL    eGFR 34  ml/min/1.73sq m   Protime-INR    Collection Time: 02/08/24 12:09 AM   Result Value Ref Range    Protime 14.3 11.6 - 14.5 seconds    INR 1.09 0.84 - 1.19   APTT    Collection Time: 02/08/24 12:09 AM   Result Value Ref Range    PTT 29 23 - 37 seconds   Type and screen    Collection Time: 02/08/24 12:09 AM   Result Value Ref Range    ABO Grouping O     Rh Factor Negative     Antibody Screen Negative     Specimen Expiration Date [id number]    ABORh Recheck - Contact Blood Bank Prior to Collection    Collection Time: 02/08/24 12:36 AM   Result Value Ref Range    ABO Grouping O     Rh Factor Negative    CBC (With Platelets)    Collection Time: 02/08/24  5:43 AM   Result Value Ref Range    WBC 10.47 (H) 4.31 - 10.16 Thousand/uL    RBC 4.16 3.81 - 5.12 Million/uL    Hemoglobin 14.4 11.5 - 15.4 g/dL    Hematocrit 42.4 34.8 - 46.1 %     (H) 82 - 98 fL    MCH 34.6 (H) 26.8 - 34.3 pg    MCHC 34.0 31.4 - 37.4 g/dL    RDW 14.9 11.6 - 15.1 %    Platelets 463 (H) 149 - 390 Thousands/uL    MPV 10.4 8.9 - 12.7 fL   Hemoglobin A1c w/EAG Estimation    Collection Time: 02/08/24  5:43 AM   Result Value Ref Range    Hemoglobin A1C 5.3 Normal 4.0-5.6%; PreDiabetic 5.7-6.4%; Diabetic >=6.5%; Glycemic control for adults with diabetes <7.0% %     mg/dl   APTT    Collection Time: 02/08/24  7:51 AM   Result Value Ref Range    PTT 49 (H) 23 - 37 seconds   Basic metabolic panel    Collection Time: 02/08/24  7:51 AM   Result Value Ref Range    Sodium 137 135 - 147 mmol/L    Potassium 3.8 3.5 - 5.3 mmol/L    Chloride 109 (H) 96 - 108 mmol/L    CO2 22 21 - 32 mmol/L    ANION GAP 6 mmol/L    BUN 26 (H) 5 - 25 mg/dL    Creatinine 1.30 0.60 - 1.30 mg/dL    Glucose 88 65 - 140 mg/dL    Glucose, Fasting 88 65 - 99 mg/dL    Calcium 8.7 8.4 - 10.2 mg/dL    eGFR 43 ml/min/1.73sq m   Lipid Panel with Direct LDL reflex    Collection Time: 02/08/24  7:51 AM   Result Value Ref Range    Cholesterol 159 See Comment mg/dL    Triglycerides 61 See  Comment mg/dL    HDL, Direct 51 >=50 mg/dL    LDL Calculated 96 0 - 100 mg/dL   Echo complete w/ contrast if indicated    Collection Time: 02/08/24 10:19 AM   Result Value Ref Range    BSA 1.64 m2    A4C EF 65 %    LVOT stroke volume 59.00     LVOT stroke volume index 36.00 ml/m2    LVOT Cardiac Output 4.48 l/min    LVOT Cardiac Index 2.73 l/min/m2    LVIDd 4.60 cm    LVIDS 3.60 cm    IVSd 1.20 cm    LVPWd 0.70 cm    LVOT diameter 2.4 cm    LVOT peak VTI 13.19 cm    FS 22 28 - 44    MV E' Tissue Velocity Septal 8 cm/s    MV E' Tissue Velocity Lateral 6 cm/s    LA Volume Index (BP) 45.7 mL/m2    E/A ratio 0.87     E wave deceleration time 144 ms    MV Peak E Sky 67 cm/s    MV Peak A Sky 0.77 m/s    AV LVOT peak gradient 3 mmHg    LVOT peak sky 0.87 m/s    RVID d 2.8 cm    Tricuspid annular plane systolic excursion 2.80 cm    LA size 3.4 cm    LA length (A2C) 4.80 cm    LA volume (BP) 75 mL    RA 2D Volume 25.0 mL    RAA A4C 11.7 cm2    Aortic valve peak velocity 1.32 m/s    Ao VTI 24.62 cm    AV mean gradient 3 mmHg    LVOT mn grad 1.0 mmHg    AV peak gradient 7 mmHg    AV area by cont VTI 2.4 cm2    AV area peak sky 3.0 cm2    MV stenosis pressure 1/2 time 42 ms    MV valve area p 1/2 method 5.20     TR Peak Sky 2.3 m/s    Triscuspid Valve Regurgitation Peak Gradient 22.0 mmHg    Ao root 3.00 cm    Asc Ao 2.9 cm    Aortic valve mean velocity 8.50 m/s    Tricuspid valve peak regurgitation velocity 2.34 m/s    Left ventricular stroke volume (2D) 41.00 mL    IVS 1.2 cm    LEFT VENTRICLE SYSTOLIC VOLUME (MOD BIPLANE) 2D 56 mL    LV DIASTOLIC VOLUME (MOD BIPLANE) 2D 97 mL    Left Atrium Area-systolic Four Chamber 26.1 cm2    Left Atrium Area-systolic Apical Two Chamber 17.1 cm2    LVSV, 2D 41 mL    LVOT area 4.52 cm2    DVI 0.66     AV valve area 2.42 cm2       Procedure: MRI brain wo contrast    Result Date: 2/7/2024  Narrative: MRI BRAIN WITHOUT CONTRAST INDICATION: R09.89: Other specified symptoms and signs involving  the circulatory and respiratory systems. COMPARISON:   None. TECHNIQUE:  Multiplanar, multisequence imaging of the brain was performed. IMAGE QUALITY:  Diagnostic. FINDINGS: BRAIN PARENCHYMA: There are acute or subacute cortical infarcts in the right precentral gyrus (series 3 image 23) and right parietal lobe (series 3 image 21). There is mild edema. No hemorrhagic transformation. There is no discrete mass, mass effect or midline shift. There is no intracranial hemorrhage. Chronic lacunar infarcts involving left basal ganglia, genu of the left internal capsule, and left corona radiata. Chronic right basal ganglia lacunar infarct. Nonspecific  foci of T2/FLAIR hyperintensities involving periventricular and subcortical white matter, most compatible with mild microangiopathic change. VENTRICLES:  Normal for the patient's age. SELLA AND PITUITARY GLAND:  Normal. ORBITS:  Normal. PARANASAL SINUSES:  Normal. VASCULATURE:  Evaluation of the major intracranial vasculature demonstrates appropriate flow voids. CALVARIUM AND SKULL BASE:  Normal. EXTRACRANIAL SOFT TISSUES:  Normal.     Impression: 1.  Acute or subacute cortical infarcts in the right precentral gyrus and right parietal lobe. No significant edema, mass effect, or hemorrhagic transformation. 2.  Chronic left gangliocapsular and corona radiata lacunar infarcts. Chronic right basal ganglia lacunar infarcts. Mild microangiopathic change. Workstation performed: [id number]       Imaging Studies: I have personally reviewed pertinent reports.    EKG, Pathology, and Other Studies: I have personally reviewed pertinent reports.      Code Status: Level 1 - Full Code    Counseling / Coordination of Care  Reviewed case with neurology attending, history and physical examination, labs and imaging completed, plan of care as per attending physician.    Please see attestation for further details.    Examined alongside attending physician.

## 2024-02-08 NOTE — TREATMENT PLAN
63-year-old female admitted to the hospital with acute/subacute stroke of the right precentral gyrus and right parietal lobe  It appears that the patient developed strokelike symptoms with upper extremity weakness and some dysarthria few days before presentation to the ER.  She was being evaluated as an outpatient by cardiology for known history of failure with reduced ejection fraction, 28%, nonischemic considered with alcohol induced versus amyloid induced patient was discussing outpatient cardiac MRI.  Upon reporting symptoms an MRI of the brain stat was obtained and patient was diagnosed with acute/subacute CVA as above and admitted for further workup  She has history of known PFO  Was started on heparin drips  She was started on baby aspirin    Currently neurology evaluation is pending  Echocardiogram has been completed and discussed with cardiology, likely for transesophageal echocardiogram on February 9  Doppler carotid has been ordered and pending  PT and OT evaluation pending    Patient was seen in her room while obtaining echocardiogram.  She is feeling well.  Her mood is flat.  Plan of care discussed with the daughter over the phone

## 2024-02-08 NOTE — ANESTHESIA PREPROCEDURE EVALUATION
Procedure:  NATHAN    Relevant Problems   NEURO/PSYCH   (+) CVA (cerebral vascular accident) (HCC)   (+) Chronic hip pain after total replacement of right hip joint      PULMONARY   (+) Other emphysema (HCC)      Other   (+) Mediastinal lymphadenopathy     2/8/24 ECHO   Left Ventricle Left ventricular cavity size is normal. Wall thickness is moderately increased. The left ventricular ejection fraction is 55%. Systolic function is normal. Global longitudinal strain is reduced at -13.4%. Wall motion is normal. Diastolic function is mildly abnormal, consistent with grade I (abnormal) relaxation.   Right Ventricle Right ventricular cavity size is normal. Systolic function is normal. Wall thickness is normal.   Left Atrium The atrium is moderately dilated.   Right Atrium The atrium is normal in size.   Aortic Valve The aortic valve is trileaflet. The leaflets are not thickened. The leaflets are not calcified. The leaflets exhibit normal mobility. There is no evidence of regurgitation. The aortic valve has no significant stenosis.   Mitral Valve There is no evidence of regurgitation. There is no evidence of stenosis. The mitral valve has normal structure and normal function.   Tricuspid Valve Tricuspid valve structure is normal. There is mild regurgitation. There is no evidence of stenosis.   Pulmonic Valve Pulmonic valve structure is normal. There is no evidence of regurgitation. There is no evidence of stenosis.   Ascending Aorta The aortic root is normal in size.   IVC/SVC The inferior vena cava is dilated. Respirophasic changes were normal.   Pericardium There is no pericardial effusion. The pericardium is normal in appearance.     Physical Exam    Airway    Mallampati score: II  TM Distance: >3 FB  Neck ROM: full     Dental       Cardiovascular  Rhythm: regular    Pulmonary   Breath sounds clear to auscultation    Other Findings  post-pubertal.      Anesthesia Plan  ASA Score- 3     Anesthesia Type- general with ASA  Monitors.         Additional Monitors:     Airway Plan:            Plan Factors-    Chart reviewed.   Existing labs reviewed.                   Induction- intravenous.    Postoperative Plan-     Informed Consent- Anesthetic plan and risks discussed with patient.

## 2024-02-08 NOTE — H&P
[facility]  H&P  Name: Ellie Lazo 63 y.o. female I MRN: [id number]  Unit/Bed#: ED-16 I Date of Admission: 2/7/2024   Date of Service: 2/8/2024 I Hospital Day: 0      Assessment/Plan   * CVA (cerebral vascular accident) (HCC)  Assessment & Plan  Presents for evaluation of CVA. Seen by cardiology group today in routine visit, she noted to them that 10 days ago she had an episode of confusion, difficulty speaking, left hand weakness all of which spontaneously resolved. A stat MRI was completed.   MRI 02/07/2024 reviewed: acute/subacute cortical infarcts in R precentral gyrus and parietal lobe, no significant edema, mass effect, hemorrhagic transformation. Chronic left ganglioscapular and corona radiata lacunar infarcts, chronic R basal ganglia lacunar infarct, mild microangiopathic changes    Plan:  Atherosclerotic vs embolic CVA given PFO. No documented AF history.   Stroke pathway, monitor neuro exams. Stat CT head with change in neuro status  SBP goal <160 and ischemic stroke heparin per neurology on-call  TTE with bubble study ordered  Maintain telemetry  A1c/lipid panel   Neurology consulted     Chronic systolic heart failure (HCC)  Assessment & Plan  Wt Readings from Last 3 Encounters:   02/07/24 63.1 kg (139 lb 1.8 oz)   02/07/24 61.2 kg (135 lb)   06/05/23 60.3 kg (133 lb)     Nonischemic, chronic daily alcohol use, questionable amyloid. Follows with [facility] heart failure group. Appears compensated   TTE 04/2023 reviewed: LVEF 28% severely reduced, global longitudinal strain with an apical sparing bulls-eye pattern, severe global hypokinesis, diastolic dysfunction severely abnormal, severe biatrial dilation, patent foramen ovale with left to R shunting, mild to moderate MR, mild to moderate TR, PASP 49mmHg   Dry Weight: ~135-139lbs    Plan:  Continue PTA losartan 12.5mg, metoprolol succinate 50mg daily, spironolactone 25mg daily   Monitor daily weight, I/Os, cardiac diet  (sodium/fluid restriction), monitor K>4, Mg>2     Other emphysema (HCC)  Assessment & Plan  History of tobacco/marijuana use. Reports quitting tobacco. No current exacerbation    Plan:  PRN albuterol    Alcohol ingestion, 1-4 drinks per day  Assessment & Plan  Reports daily alcohol ingestion    Plan:  Monitor for withdrawal     VTE Pharmacologic Prophylaxis: VTE Score: 8 High Risk (Score >/= 5) - Pharmacological DVT Prophylaxis Ordered: heparin drip. Sequential Compression Devices Ordered.  Code Status: Prior   Discussion with family: Updated  (family) at bedside.    Anticipated Length of Stay: Patient will be admitted on an observation basis with an anticipated length of stay of less than 2 midnights secondary to CVA.    Total Time Spent on Date of Encounter in care of patient:  mins. This time was spent on one or more of the following: performing physical exam; counseling and coordination of care; obtaining or reviewing history; documenting in the medical record; reviewing/ordering tests, medications or procedures; communicating with other healthcare professionals and discussing with patient's family/caregivers.    Chief Complaint: abnormal imaging    History of Present Illness:  Ellie Lazo is a 63 y.o. female with a PMH of HTN, non-ischemic cardiomyopathy, HFrEF who presents with abnormal imaging.   History obtained from patient, chart review and discussion with ED attending. She presents tonight after abnormal MRI findings outpatient. She was at a cardiology appointment today when she told them that about 10 days prior she had an episode of confusion, difficulty speaking and left arm/hand weakness. Her symptoms basically all resolved now. They prompted her to complete an outpatient MRI finding a stroke. Today she has no complaints but was sent to ED for evaluation. Reports compliance with all medications, just started on losartan today but did not fill prescription. She denies tobacco use.  Reports daily alcohol consumption 2 drinks a day as well as marijuana use.     Review of Systems:  Review of Systems   Constitutional:  Negative for chills and fever.   Respiratory:  Negative for shortness of breath.    Cardiovascular:  Negative for chest pain and palpitations.   Gastrointestinal:  Negative for abdominal pain.   Neurological:  Negative for syncope, facial asymmetry, speech difficulty and headaches.   All other systems reviewed and are negative.      Past Medical and Surgical History:   Past Medical History:   Diagnosis Date    CHF (congestive heart failure) (HCC)     Hypertension        Past Surgical History:   Procedure Laterality Date    CARDIAC CATHETERIZATION N/A 6/6/2022    Procedure: Cardiac catheterization;  Surgeon: Donta Guardado MD;  Location: [facility];  Service: Cardiology    CERVICAL CONE BIOPSY      FL GUIDED NEEDLE PLAC BX/ASP/INJ  10/21/2020    HIP SURGERY      HIP SURGERY         Meds/Allergies:  Prior to Admission medications    Medication Sig Start Date End Date Taking? Authorizing Provider   ALPRAZolam (XANAX) 0.25 mg tablet Take 1 tablet (0.25 mg total) by mouth once as needed for anxiety (for anxiety/claustrophobia (take one, additonal one as needed)) for up to 2 doses Do not drive after taking this medication. 2/7/24  Yes Yue Aparicio PA-C   losartan (COZAAR) 25 mg tablet Take 0.5 tablets (12.5 mg total) by mouth daily 2/7/24  Yes Yue Aparicio PA-C   metoprolol succinate (TOPROL-XL) 50 mg 24 hr tablet Take 1 tablet (50 mg total) by mouth daily 6/5/23  Yes James Navarro, DO   spironolactone (ALDACTONE) 25 mg tablet Take 1 tablet (25 mg total) by mouth daily 6/5/23  Yes James Navarro,    furosemide (LASIX) 20 mg tablet Take 1 tablet (20 mg total) by mouth daily Take 1 tablet (20 mg) for the next three days.  Patient not taking: Reported on 6/5/2023 4/19/23   Yue Aparicio PA-C     I have reviewed home medications using recent Epic  "encounter.    Allergies:   Allergies   Allergen Reactions    Codeine GI Intolerance and Vomiting    Oxycodone-Acetaminophen GI Intolerance and Vomiting    Propoxyphene Vomiting       Social History:  Marital Status: /Civil Union   Occupation:   Patient Pre-hospital Living Situation: Home  Patient Pre-hospital Level of Mobility: walks  Patient Pre-hospital Diet Restrictions:   Substance Use History:   Social History     Substance and Sexual Activity   Alcohol Use Yes    Alcohol/week: 4.0 standard drinks of alcohol    Types: 4 Shots of liquor per week     Social History     Tobacco Use   Smoking Status Some Days    Current packs/day: 0.50    Types: Cigarettes   Smokeless Tobacco Never     Social History     Substance and Sexual Activity   Drug Use Yes    Types: Marijuana       Family History:  Family History   Problem Relation Age of Onset    Diabetes Mother     COPD Mother     Hyperlipidemia Mother     Stroke Mother     Lung cancer Father        Physical Exam:     Vitals:   Blood Pressure: 163/91 (02/08/24 0030)  Pulse: 83 (02/08/24 0030)  Temperature: 98.4 °F (36.9 °C) (02/07/24 2220)  Temp Source: Oral (02/07/24 2220)  Respirations: 18 (02/08/24 0030)  Height: 5' 2\" (157.5 cm) (02/08/24 0030)  Weight - Scale: 63.1 kg (139 lb 1.8 oz) (02/08/24 0030)  SpO2: 96 % (02/08/24 0030)    Physical Exam  Vitals and nursing note reviewed.   Constitutional:       General: She is not in acute distress.     Appearance: She is well-developed.   HENT:      Head: Normocephalic and atraumatic.   Eyes:      Conjunctiva/sclera: Conjunctivae normal.   Cardiovascular:      Rate and Rhythm: Normal rate and regular rhythm.      Heart sounds: No murmur heard.  Pulmonary:      Effort: Pulmonary effort is normal. No respiratory distress.      Breath sounds: Normal breath sounds.   Abdominal:      Palpations: Abdomen is soft.      Tenderness: There is no abdominal tenderness.   Musculoskeletal:         General: No swelling.      " Cervical back: Neck supple.   Skin:     General: Skin is warm and dry.      Capillary Refill: Capillary refill takes less than 2 seconds.   Neurological:      Mental Status: She is alert and oriented to person, place, and time.      Cranial Nerves: No cranial nerve deficit.      Sensory: No sensory deficit.      Comments: Bilateral UE/LE strength intact, no pronator drift, subtle dysmetria in left upper extremity, normal finger to nose in right, normal heel to shin bilaterally, no hemineglect or extinction   Psychiatric:         Mood and Affect: Mood normal.          Additional Data:     Lab Results:  Results from last 7 days   Lab Units 02/08/24  0009   WBC Thousand/uL 10.73*   HEMOGLOBIN g/dL 14.7   HEMATOCRIT % 43.0   PLATELETS Thousands/uL 622*   NEUTROS PCT % 62   LYMPHS PCT % 23   MONOS PCT % 11   EOS PCT % 2     Results from last 7 days   Lab Units 02/08/24  0009   SODIUM mmol/L 138   POTASSIUM mmol/L 4.4   CHLORIDE mmol/L 105   CO2 mmol/L 26   BUN mg/dL 27*   CREATININE mg/dL 1.58*   ANION GAP mmol/L 7   CALCIUM mg/dL 9.0   ALBUMIN g/dL 4.1   TOTAL BILIRUBIN mg/dL 0.24   ALK PHOS U/L 136*   ALT U/L 35   AST U/L 43*   GLUCOSE RANDOM mg/dL 94     Results from last 7 days   Lab Units 02/08/24  0009   INR  1.09     Results from last 7 days   Lab Units 02/07/24  2214   POC GLUCOSE mg/dl 143*               Lines/Drains:  Invasive Devices       Peripheral Intravenous Line  Duration             Peripheral IV 02/08/24 Right;Ventral (anterior) Forearm <1 day                        Imaging: Reviewed radiology reports from this admission including: MRI brain and Personally reviewed the following imaging: MRI brain  No orders to display       EKG and Other Studies Reviewed on Admission:   EKG: Sinus Tachycardia. HR 95.    ** Please Note: This note has been constructed using a voice recognition system. **

## 2024-02-08 NOTE — RESTORATIVE TECHNICIAN NOTE
Restorative Technician Note      Patient Name: Ellie Lazo     Restorative Tech Visit Date: 02/08/24  Note Type: Mobility  Patient Position Upon Consult: Supine  Activity Performed: Ambulated  Patient Position at End of Consult: Supine; All needs within reach

## 2024-02-08 NOTE — ASSESSMENT & PLAN NOTE
History of tobacco/marijuana use. Reports quitting tobacco. No current exacerbation    Plan:  PRN albuterol

## 2024-02-08 NOTE — ASSESSMENT & PLAN NOTE
Presents for evaluation of CVA. Seen by cardiology group today in routine visit, she noted to them that 10 days ago she had an episode of confusion, difficulty speaking, left hand weakness all of which spontaneously resolved. A stat MRI was completed.   MRI 02/07/2024 reviewed: acute/subacute cortical infarcts in R precentral gyrus and parietal lobe, no significant edema, mass effect, hemorrhagic transformation. Chronic left ganglioscapular and corona radiata lacunar infarcts, chronic R basal ganglia lacunar infarct, mild microangiopathic changes    Plan:  Atherosclerotic vs embolic CVA given PFO. No documented AF history.   Stroke pathway, monitor neuro exams. Stat CT head with change in neuro status  SBP goal <160 and ischemic stroke heparin per neurology on-call  TTE with bubble study ordered  Maintain telemetry  A1c/lipid panel   Neurology consulted

## 2024-02-08 NOTE — CONSULTS
Consultation Note - Cardiology   Ellie Lazo 63 y.o. female MRN: [id number]  Unit/Bed#: E4 -01 Encounter: [id number]  02/08/24  8:12 AM    Encounter date: 2/8/24  Patient name: Ellie Lazo 63 y.o. female  Medical Student: Josephine Trevino, 4th year medical student, [employer]   Cardiologist Attending Physician: Dr. Montano  Inpatient attending physician: Dr. Weston  Primary care provider: No primary care provider on file.  Date of admission: 2/7/24  Length of stay: 1 day      Assessment/ Plan:    Chronic Systolic HFrEF, NYHA II   Etiology: likely nonischemic cardiomyopathy   Most Recent Echo:  TTE 4/5/23: LVEF 28%, global longitudinal strain reduced at -8% with an apical sparing bulls-eye pattern, severe global hypokinesis with regional variation. Diastolic function severely abnormal consistent with ungraded restrictive relaxation. Severe biatrial dilatation. Patent foramen ovale atrial septum with left to right shunting. Mild to moderate MR, mild to moderate TR, PASP 49 mmHg. IVC dilated.   Concern for possible amyloid due to strain pattern on previous echo. Workup so far: SPEP with absence of monoclonal bands, awaiting cardiac MRI  Will follow-up with Echo done earlier this morning   Current Core Heart failure medications:  Diuretic: at home takes 20 mg lasix PRN, has not required it recently  Beta-blocker: metoprolol succinate 50 mg QD  Renin-angiotensin inhibition: losartan 12.5 mg QD, previously on Entresto 24-26 mg BID which was stopped due to lightheadedness  Aldosterone antagonist: spironolactone 25 mg QD  Sodium glucose cotransporter 2 (SGLT2 ) inhibitor: did not tolerate Jardiance  Hydralazine plus isosorbide dinitrate: hydralazine 5 mg IV PRN for SBP  >160  Volume status: Euvolemic     Nonischemic, dilated cardiomyopathy  Left heart catheterization 6/022 showed normal epicardial coronary arteries  History of heavy alcohol use   EKG showing normal sinus rhythm, LV hypertrophy with  repolarization abnormality    CVA  Pulmonary Hypertension, group 2 in the setting of dilated cardiomyopathy +/-COPD  Chronic Alcohol Use Disorder   Tobacco Use Disorder   Hypertension      Summary/Recommendations    Ms. Lazo is a 64 yo female with PMH longstanding hypertension, PFO, nonischemic dilated cardiomyopathy, pulmonary hypertension, tobacco use, chronic alcohol use, COPD, and chronic systolic HFrEF (previous EF 28%) who presents after a cortical infarct with concern to rule out cardio embolic etiology. She has a known PFO and EF<30%-which in and of itself is a risk factor for cerebral infarction. Plan for NATHAN tomorrow to assess PFO and atheroma, most likely will start on oral anticoagulation with continued cardiac follow-up.     Plan:  Follow-up with Cardiac MRI  Plan for NATHAN tomorrow, keep n.p.o. after midnight  Follow-up with VAS carotid study and MRA head and neck  Recommended for CT chest/abdomen/pelvis by hematology to rule out hypercoagulable malignancy state in light of history of previous stable mediastinal lymphadenopathy-> study not performed, consider doing inpatient  Continue metoprolol succinate 50 mg QD, spironolactone 25 mg QD, aspirin 81 mg QD, and lipitor 40 mg QD  Currently on losartan 12.5 mg QD, can hold if pressures start to drop to avoid hypotension with regards to cerebral perfusion  Continue to monitor on telemetry  Stroke pathway per Neuro  EF less than 35% is also risk for CVA, would likely benefit from NOAC on discharge  If patient is started on NOAC or Coumadin on discharge, then low yield in using Zio patch to monitor for A-fib/flutter as will not     HPI: Ellie Lazo is a 63 y.o. year old female with history of longstanding hypertension, PFO, nonischemic dilated cardiomyopathy, pulmonary hypertension, tobacco use history, chronic alcohol use, and chronic systolic HFrEF (previous EF 28%) who presents after a cortical infarct with concern to rule out  cardio embolic etiology. She follow with our Cardiology clinic and was last seen yesterday. She reported acute confusion and difficulty speaking around ten days prior. She woke up the next morning with left hand weakness. No other symptoms besides a right-sided headache. Due to those clinical symptoms, she was sent to get an MRI brain which revealed an acute to subacute cortical infarct in the right precentral gyrus and right parietal lobe. Of note, there was also chronic left gangliocapsular and corona radiata lacunar infarcts in addition to chronic right basal ganglia lacunar infarcts. She was sent to the ED and admitted on a stroke pathway to rule out embolic stroke.     To review her Cardiac history, she was admitted in 6/2022 with acute heart failure, new onset. Cardiac catheterization showed no evidence of obstructive pathology. She was started on medical therapy. In 4/2023 she was admitted again for acute on chronic HF with a TTE at the time showing global longitudinal strain reduced at -8% with an apical sparing bulls-eye pattern, severe global hypokinesis with regional variation. Diastolic function severely abnormal consistent with ungraded restrictive relaxation. Severe biatrial dilatation. Patent foramen ovale atrial septum with left to right shunting. Due to strain pattern seen, there was concern for possible amyloid. Cardiac MRI ordered but no done previously. She was referred to hematology due to abnormal serum free light chains and no further workup was recommended due to absence of monoclonal protein. She was recommended to obtain a CT c/a/p due to night sweats and stable mediastinal lymphadenopathy but I do not see one in the chart recently and she does not remember getting one.     She also has a longstanding history of HTN and alcohol use (2-3 drinks per day). She still smokes cigarettes every now and then. She previously heavily smoked around a half-pack for forty years. No drug use besides  marijuana daily. Family history of lung cancer in father who was a smoker and breast cancer in aunt.      She denies a history of atrial fibrillation or any arrhthymias. No recent travel or illness. No leg pain, erythema, or swelling. She has not needed to take her home lasix recently. She denies chest pain, shortness of breath, palpitations, lightheadedness, and orthopnea. Today, she denies any symptoms.         Review of Systems   Constitutional:  Negative for activity change and fatigue.   HENT:  Negative for congestion.    Respiratory:  Negative for cough and shortness of breath.    Cardiovascular:  Negative for chest pain, palpitations and leg swelling.   Gastrointestinal:  Negative for abdominal pain, diarrhea and nausea.   Musculoskeletal:  Negative for arthralgias.   Skin:  Negative for color change, pallor and rash.   Neurological:  Negative for dizziness, facial asymmetry, light-headedness, numbness and headaches.   Psychiatric/Behavioral:  The patient is not nervous/anxious.        Historical Information   Past Medical History:   Diagnosis Date    CHF (congestive heart failure) (HCC)     Hypertension      Past Surgical History:   Procedure Laterality Date    CARDIAC CATHETERIZATION N/A 6/6/2022    Procedure: Cardiac catheterization;  Surgeon: Donta Guardado MD;  Location: [facility];  Service: Cardiology    CERVICAL CONE BIOPSY      FL GUIDED NEEDLE PLAC BX/ASP/INJ  10/21/2020    HIP SURGERY      HIP SURGERY       Social History     Substance and Sexual Activity   Alcohol Use Yes    Alcohol/week: 4.0 standard drinks of alcohol    Types: 4 Shots of liquor per week     Social History     Substance and Sexual Activity   Drug Use Yes    Types: Marijuana     Social History     Tobacco Use   Smoking Status Some Days    Current packs/day: 0.50    Types: Cigarettes   Smokeless Tobacco Never       Family History:   Family History   Problem Relation Age of Onset    Diabetes Mother     COPD Mother      "Hyperlipidemia Mother     Stroke Mother     Lung cancer Father        Meds/Allergies   all current active meds have been reviewed and current meds:   Current Facility-Administered Medications   Medication Dose Route Frequency    acetaminophen (TYLENOL) tablet 650 mg  650 mg Oral Q4H PRN    aluminum-magnesium hydroxide-simethicone (MAALOX) oral suspension 30 mL  30 mL Oral Q6H PRN    aspirin chewable tablet 81 mg  81 mg Oral Daily    atorvastatin (LIPITOR) tablet 40 mg  40 mg Oral QPM    heparin (porcine) 25,000 units in 0.45% NaCl 250 mL infusion (premix)  3-24 Units/kg/hr (Order-Specific) Intravenous Titrated    hydrALAZINE (APRESOLINE) injection 5 mg  5 mg Intravenous Q6H PRN    losartan (COZAAR) tablet 12.5 mg  12.5 mg Oral Daily    metoprolol succinate (TOPROL-XL) 24 hr tablet 50 mg  50 mg Oral Daily    ondansetron (ZOFRAN) injection 4 mg  4 mg Intravenous Q6H PRN    polyethylene glycol (MIRALAX) packet 17 g  17 g Oral Daily PRN    spironolactone (ALDACTONE) tablet 25 mg  25 mg Oral Daily     Allergies   Allergen Reactions    Codeine GI Intolerance and Vomiting    Oxycodone-Acetaminophen GI Intolerance and Vomiting    Propoxyphene Vomiting       Objective   Vitals: Blood pressure 154/70, pulse 72, temperature (!) 96.9 °F (36.1 °C), temperature source Temporal, resp. rate 18, height 5' 2\" (1.575 m), weight 63.2 kg (139 lb 5.3 oz), SpO2 91%, currently breastfeeding., Body mass index is 25.48 kg/m².,   Orthostatic Blood Pressures      Flowsheet Row Most Recent Value   Blood Pressure 154/70 filed at 2024 0600   Patient Position - Orthostatic VS Lying filed at 2024 0600            Systolic (24hrs), Av , Min:135 , Max:175     Diastolic (24hrs), Av, Min:70, Max:106      No intake or output data in the 24 hours ending 24 0812    Invasive Devices       Peripheral Intravenous Line  Duration             Peripheral IV 24 Right;Ventral (anterior) Forearm <1 day                      Physical " "Exam  Constitutional:       General: She is not in acute distress.     Appearance: She is not ill-appearing.   HENT:      Head: Normocephalic.   Cardiovascular:      Rate and Rhythm: Normal rate and regular rhythm.   Pulmonary:      Effort: Pulmonary effort is normal. No respiratory distress.      Breath sounds: Normal breath sounds.   Abdominal:      General: Abdomen is flat.      Palpations: Abdomen is soft.      Tenderness: There is no guarding.   Musculoskeletal:      Right lower leg: No edema.      Left lower leg: No edema.   Skin:     General: Skin is warm and dry.   Neurological:      Mental Status: She is alert.      Comments: Full strength throughout except for 4+/5 strength left upper extremity          EKG:   Date: 2/7/24  Interpretation: Normal Sinus Rhythm. Left Ventricular hypertrophy with repolarization abnormality. T wave inversion evident in inferior leads    Imaging: I have personally reviewed pertinent reports.      Telemetry:   Normal sinus rhythm     ECHO: ordered  Last one 4/5/23: LVEF 28%. Systolic function is severely reduced. Global longitudinal strain is reduced at -8% with an apical sparing \"bull's-eye\" pattern. There is severe global hypokinesis with regional variation. Diastolic function is severely abnormal, consistent with ungraded restrictive relaxation. Left atrial filling pressure is elevated. Estimate right ventricular systolic pressure is 49 mmHg     CATH/STRESS TEST:   6/2022: No obstructive disease      CBC with diff:   Results from last 7 days   Lab Units 02/08/24  0543 02/08/24  0009   WBC Thousand/uL 10.47* 10.73*   HEMOGLOBIN g/dL 14.4 14.7   HEMATOCRIT % 42.4 43.0   MCV fL 102* 100*   PLATELETS Thousands/uL 463* 622*   RBC Million/uL 4.16 4.31   MCH pg 34.6* 34.1   MCHC g/dL 34.0 34.2   RDW % 14.9 14.6   MPV fL 10.4 9.9   NRBC AUTO /100 WBCs  --  0       CMP:   Results from last 7 days   Lab Units 02/08/24  0009   POTASSIUM mmol/L 4.4   CHLORIDE mmol/L 105   CO2 mmol/L 26 "   BUN mg/dL 27*   CREATININE mg/dL 1.58*   CALCIUM mg/dL 9.0   AST U/L 43*   ALT U/L 35   ALK PHOS U/L 136*   EGFR ml/min/1.73sq m 34       Josephine Trevino  MS4

## 2024-02-08 NOTE — ASSESSMENT & PLAN NOTE
60-year-old female with past medical history of hypertension, cardiomyopathy, PFO, history of tobacco abuse, COPD, and alcohol use.  Patient was seen by her cardiologist and MRI was ordered secondary to 10 days prior of an episode of confusion, difficulty speaking and left arm and hand weakness.  MRI revealed an acute to subacute cortical infarct in the right precentral gyrus and right parietal lobe.  The patient presented to [facility] for further evaluation.    Acute/subacute cortical infarcts in the right precentral gyrus and right parietal lobe -suspect cardioembolic etiology cannot exclude vessel vessel atheroma  CHF - TTE 04/2023 reviewed: LVEF 28% severely reduced, global longitudinal strain with an apical sparing bulls-eye pattern, severe global hypokinesis, diastolic dysfunction severely abnormal, severe biatrial dilation, patent foramen ovale with left to R shunting, mild to moderate MR, mild to moderate TR.  Most recent Echo Ef of 55%.  COPD  Tobacco use  Alcohol use  Hypertension    - Stroke pathway  MRA of head and neck, gfr was 34  Most recent echo with an ef of 55%, left atrium is moderately dilated.  NATHAN recommended by cardiology.   Carotid doppler less than 50% bilaterally.   Lipid Panel - LDL 96  Hemoglobin A1c 5.3  Aspirin 81 mg   Started on acute stroke heparin drip, monitor APTT closely (reviewed with attending)  Atorvastatin 40 mg  Normotensive blood pressures  Continue telemetry, monitor for afib  PT/OT/ST  Frequent neuro checks. Continue to monitor and notify neurology with any changes.   - Medical management and supportive care per primary team. Correction of any metabolic or infectious disturbances.    - Reviewed with attending, plan of care per attending physician.

## 2024-02-08 NOTE — ASSESSMENT & PLAN NOTE
Nonischemic, chronic daily alcohol use, questionable amyloid. Follows with [facility] heart failure group

## 2024-02-08 NOTE — PLAN OF CARE
Problem: PAIN - ADULT  Goal: Verbalizes/displays adequate comfort level or baseline comfort level  Description: Interventions:  - Encourage patient to monitor pain and request assistance  - Assess pain using appropriate pain scale  - Administer analgesics based on type and severity of pain and evaluate response  - Implement non-pharmacological measures as appropriate and evaluate response  - Consider cultural and social influences on pain and pain management  - Notify physician/advanced practitioner if interventions unsuccessful or patient reports new pain  Outcome: Progressing     Problem: INFECTION - ADULT  Goal: Absence or prevention of progression during hospitalization  Description: INTERVENTIONS:  - Assess and monitor for signs and symptoms of infection  - Monitor lab/diagnostic results  - Monitor all insertion sites, i.e. indwelling lines, tubes, and drains  - Monitor endotracheal if appropriate and nasal secretions for changes in amount and color  - [place] appropriate cooling/warming therapies per order  - Administer medications as ordered  - Instruct and encourage patient and family to use good hand hygiene technique  - Identify and instruct in appropriate isolation precautions for identified infection/condition  Outcome: Progressing     Problem: SAFETY ADULT  Goal: Patient will remain free of falls  Description: INTERVENTIONS:  - Educate patient/family on patient safety including physical limitations  - Instruct patient to call for assistance with activity   - Consult OT/PT to assist with strengthening/mobility   - Keep Call bell within reach  - Keep bed low and locked with side rails adjusted as appropriate  - Keep care items and personal belongings within reach  - Initiate and maintain comfort rounds  - Make Fall Risk Sign visible to staff  - Offer Toileting every 2 Hours, in advance of need  - Initiate/Maintain bed alarm  - Obtain necessary fall risk management equipment: bed alarm  - Apply yellow  socks and bracelet for high fall risk patients  - Consider moving patient to room near nurses station  Outcome: Progressing  Goal: Maintain or return to baseline ADL function  Description: INTERVENTIONS:  -  Assess patient's ability to carry out ADLs; assess patient's baseline for ADL function and identify physical deficits which impact ability to perform ADLs (bathing, care of mouth/teeth, toileting, grooming, dressing, etc.)  - Assess/evaluate cause of self-care deficits   - Assess range of motion  - Assess patient's mobility; develop plan if impaired  - Assess patient's need for assistive devices and provide as appropriate  - Encourage maximum independence but intervene and supervise when necessary  - Involve family in performance of ADLs  - Assess for home care needs following discharge   - Consider OT consult to assist with ADL evaluation and planning for discharge  - Provide patient education as appropriate  Outcome: Progressing  Goal: Maintains/Returns to pre admission functional level  Description: INTERVENTIONS:  - Perform AM-PAC 6 Click Basic Mobility/ Daily Activity assessment daily.  - Set and communicate daily mobility goal to care team and patient/family/caregiver.   - Collaborate with rehabilitation services on mobility goals if consulted  - Out of bed for toileting  - Record patient progress and toleration of activity level   Outcome: Progressing     Problem: DISCHARGE PLANNING  Goal: Discharge to home or other facility with appropriate resources  Description: INTERVENTIONS:  - Identify barriers to discharge w/patient and caregiver  - Arrange for needed discharge resources and transportation as appropriate  - Identify discharge learning needs (meds, wound care, etc.)  - Arrange for interpretive services to assist at discharge as needed  - Refer to Case Management Department for coordinating discharge planning if the patient needs post-hospital services based on physician/advanced practitioner order  or complex needs related to functional status, cognitive ability, or social support system  Outcome: Progressing     Problem: Knowledge Deficit  Goal: Patient/family/caregiver demonstrates understanding of disease process, treatment plan, medications, and discharge instructions  Description: Complete learning assessment and assess knowledge base.  Interventions:  - Provide teaching at level of understanding  - Provide teaching via preferred learning methods  Outcome: Progressing     Problem: NEUROSENSORY - ADULT  Goal: Achieves stable or improved neurological status  Description: INTERVENTIONS  - Monitor and report changes in neurological status  - Monitor vital signs such as temperature, blood pressure, glucose, and any other labs ordered   - Initiate measures to prevent increased intracranial pressure  - Monitor for seizure activity and implement precautions if appropriate      Outcome: Progressing     Problem: CARDIOVASCULAR - ADULT  Goal: Absence of cardiac dysrhythmias or at baseline rhythm  Description: INTERVENTIONS:  - Continuous cardiac monitoring, vital signs, obtain 12 lead EKG if ordered  - Administer antiarrhythmic and heart rate control medications as ordered  - Monitor electrolytes and administer replacement therapy as ordered  Outcome: Progressing

## 2024-02-08 NOTE — UTILIZATION REVIEW
Initial Clinical Review    Admission: Date/Time/Statement:   Admission Orders (From admission, onward)       Ordered        02/07/24 2306  Place in Observation  Once                          Orders Placed This Encounter   Procedures    Place in Observation     Standing Status:   Standing     Number of Occurrences:   1     Order Specific Question:   Level of Care     Answer:   Med Surg [16]     ED Arrival Information       Expected   2/7/2024     Arrival   2/7/2024 22:05    Acuity   Emergent              Means of arrival   Walk-In    Escorted by   Family Member    Service   Hospitalist    Admission type   Emergency              Arrival complaint   Acute CVA (cerebrovascular accident) (HCC)             Chief Complaint   Patient presents with    CVA/TIA-like Symptoms     Patient states had mri of brain earlier and instructed by doctor to come to er for stroke        Initial Presentation: 63 y.o. female presented to ED as observation for a CVA.Seen by cardiology group today in routine visit, she noted to them that 10 days ago she had an episode of confusion, difficulty speaking, left hand weakness all of which spontaneously resolved. A stat MRI was completed. The symptoms have subsequently resolved except for some slight left upper extremity weakness per the patient. On exam ill appearing: Cranial nerves II through XII intact, sensation intact throughout, strength out of 5 throughout.  No drift in bilateral upper or lower extremities.  Subtle dysmetria noted in left upper extremity.  Normal finger-nose right upper extremity.  Normal heel-to-shin bilateral lower extremities.  No hemineglect.  No extinction.  Visual fields tested and normal.  GCS 15 Plan  TTE consult neurology & cardiology 1800 ml fluid restriction and supportive care         Date:    Day 2:     ED Triage Vitals   Temperature Pulse Respirations Blood Pressure SpO2   02/07/24 2220 02/07/24 2211 02/07/24 2211 02/07/24 2211 02/07/24 2211   98.4 °F (36.9 °C)  98 18 (!) 173/99 99 %      Temp Source Heart Rate Source Patient Position - Orthostatic VS BP Location FiO2 (%)   02/07/24 2220 02/07/24 2230 02/07/24 2211 02/07/24 2211 --   Oral Monitor Lying Right arm       Pain Score       02/08/24 0030       No Pain          Wt Readings from Last 1 Encounters:   02/08/24 63.2 kg (139 lb 5.3 oz)     Additional Vital Signs:     ate/Time Temp Pulse Resp BP MAP (mmHg) SpO2 O2 Device Patient Position - Orthostatic VS   02/08/24 0800 98 °F (36.7 °C) 90 18 164/87 118 92 % None (Room air) Lying   02/08/24 0600 -- 72 -- 154/70 -- -- -- Lying   02/08/24 0515 -- -- -- 154/89 -- -- -- --   02/08/24 0455 -- -- -- 169/90 -- -- -- --   02/08/24 0400 -- 58 18 171/79 Abnormal  114 -- -- Lying   02/08/24 0300 -- 85 18 160/92 120 91 % None (Room air) Lying   02/08/24 0200 -- 73 18 158/86 113 96 % None (Room air) Lying   02/08/24 0133 -- -- -- 148/90 -- -- -- --   02/08/24 0100 96.9 °F (36.1 °C) Abnormal  79 20 175/106 Abnormal  132 97 % None (Room air) Lying   02/08/24 0045 -- 81 19 142/89 110 95 % None (Room air) Lying   02/08/24 0030 -- 83 18 163/91 119 96 % None (Room air) Sitting   02/08/24 0015 -- 85 18 147/91 112 96 % None (Room air) Lying   02/07/24 2315 -- 95 18 135/87 106 95 % -- --   02/07/24 2250 -- 103 16 145/91 -- 97 % None (Room air) Sitting   02/07/24 2230 -- 97 18 141/90 111 95 % None (Room air) --             Pertinent Labs/Diagnostic Test Results:   VAS carotid complete study    (Results Pending)   MRI inpatient order    (Results Pending)         Results from last 7 days   Lab Units 02/08/24  0543 02/08/24  0009   WBC Thousand/uL 10.47* 10.73*   HEMOGLOBIN g/dL 14.4 14.7   HEMATOCRIT % 42.4 43.0   PLATELETS Thousands/uL 463* 622*   NEUTROS ABS Thousands/µL  --  6.57         Results from last 7 days   Lab Units 02/08/24  0751 02/08/24  0009   SODIUM mmol/L 137 138   POTASSIUM mmol/L 3.8 4.4   CHLORIDE mmol/L 109* 105   CO2 mmol/L 22 26   ANION GAP mmol/L 6 7   BUN mg/dL 26* 27*    CREATININE mg/dL 1.30 1.58*   EGFR ml/min/1.73sq m 43 34   CALCIUM mg/dL 8.7 9.0     Results from last 7 days   Lab Units 02/08/24  0009   AST U/L 43*   ALT U/L 35   ALK PHOS U/L 136*   TOTAL PROTEIN g/dL 7.3   ALBUMIN g/dL 4.1   TOTAL BILIRUBIN mg/dL 0.24     Results from last 7 days   Lab Units 02/07/24  2214   POC GLUCOSE mg/dl 143*     Results from last 7 days   Lab Units 02/08/24  0751 02/08/24  0009   GLUCOSE RANDOM mg/dL 88 94         Results from last 7 days   Lab Units 02/08/24  0751 02/08/24  0009   PROTIME seconds  --  14.3   INR   --  1.09   PTT seconds 49* 29           ED Treatment:   Medication Administration from 02/07/2024 1820 to 02/08/2024 0056         Date/Time Order Dose Route Action     02/08/2024 0043 EST heparin (porcine) 25,000 units in 0.45% NaCl 250 mL infusion (premix) 15 Units/kg/hr Intravenous New Bag          Past Medical History:   Diagnosis Date    CHF (congestive heart failure) ([facility])     Hypertension      Present on Admission:   Other emphysema (HCC)   Chronic systolic heart failure (HCC)   CVA (cerebral vascular accident) ([facility])      Admitting Diagnosis: Hypertension [I10]  PFO (patent foramen ovale) [Q21.12]  CVA (cerebral vascular accident) ([facility]) [I63.9]  Age/Sex: 63 y.o. female    Admission Orders:  VS & neuro checks q 1 hr x 4 hrs  q 2 hr x 8 hrs q 4 hr x 72 h\s  ECHO  MRI  PT/OT  SCD            Scheduled Medications:  aspirin, 81 mg, Oral, Daily  atorvastatin, 40 mg, Oral, QPM  losartan, 12.5 mg, Oral, Daily  metoprolol succinate, 50 mg, Oral, Daily  spironolactone, 25 mg, Oral, Daily      Continuous IV Infusions:  heparin (porcine), 3-24 Units/kg/hr (Order-Specific), Intravenous, Titrated      PRN Meds:  acetaminophen, 650 mg, Oral, Q4H PRN  aluminum-magnesium hydroxide-simethicone, 30 mL, Oral, Q6H PRN  hydrALAZINE, 5 mg, Intravenous, Q6H PRN  ondansetron, 4 mg, Intravenous, Q6H PRN  polyethylene glycol, 17 g, Oral, Daily PRN        IP CONSULT TO NEUROLOGY  IP CONSULT TO  CARDIOLOGY    Network Utilization Review Department  ATTENTION: Please call with any questions or concerns to [phone] and carefully listen to the prompts so that you are directed to the right person. All voicemails are confidential.   For Discharge needs, contact Care Management DC Support Team at [phone] opt. 2  Send all requests for admission clinical reviews, approved or denied determinations and any other requests to dedicated fax number below belonging to the campus where the patient is receiving treatment. List of dedicated fax numbers for the Facilities:  FACILITY NAME UR FAX NUMBER   ADMISSION DENIALS (Administrative/Medical Necessity) [phone]   DISCHARGE SUPPORT TEAM (NETWORK) [phone]   PARENT CHILD HEALTH (Maternity/NICU/Pediatrics) [phone]   [facility] [phone]   [facility] [phone]   [facility] [phone]   [facility] [phone]   [facility] [phone]   [facility] [phone]   [facility] [phone]   [facility] [phone]   [facility] [phone]   [facility] [phone]   [facility] [phone]   [facility] [phone]

## 2024-02-09 ENCOUNTER — APPOINTMENT (OUTPATIENT)
Dept: NON INVASIVE DIAGNOSTICS | Facility: HOSPITAL | Age: 64
DRG: 045 | End: 2024-02-09
Attending: STUDENT IN AN ORGANIZED HEALTH CARE EDUCATION/TRAINING PROGRAM
Payer: MEDICARE

## 2024-02-09 ENCOUNTER — ANESTHESIA (OUTPATIENT)
Dept: NON INVASIVE DIAGNOSTICS | Facility: HOSPITAL | Age: 64
DRG: 045 | End: 2024-02-09
Payer: MEDICARE

## 2024-02-09 PROBLEM — Q21.12 PATENT FORAMEN OVALE: Status: ACTIVE | Noted: 2024-02-09

## 2024-02-09 PROBLEM — N18.30 CKD (CHRONIC KIDNEY DISEASE) STAGE 3, GFR 30-59 ML/MIN (HCC): Status: ACTIVE | Noted: 2024-02-09

## 2024-02-09 LAB
ANION GAP SERPL CALCULATED.3IONS-SCNC: 5 MMOL/L
APTT PPP: 35 SECONDS (ref 23–37)
APTT PPP: 50 SECONDS (ref 23–37)
APTT PPP: 54 SECONDS (ref 23–37)
APTT PPP: 71 SECONDS (ref 23–37)
APTT PPP: 90 SECONDS (ref 23–37)
BASOPHILS # BLD AUTO: 0.14 THOUSANDS/ÂΜL (ref 0–0.1)
BASOPHILS NFR BLD AUTO: 1 % (ref 0–1)
BUN SERPL-MCNC: 24 MG/DL (ref 5–25)
CALCIUM SERPL-MCNC: 8.7 MG/DL (ref 8.4–10.2)
CHLORIDE SERPL-SCNC: 110 MMOL/L (ref 96–108)
CO2 SERPL-SCNC: 23 MMOL/L (ref 21–32)
CREAT SERPL-MCNC: 1.31 MG/DL (ref 0.6–1.3)
EOSINOPHIL # BLD AUTO: 0.41 THOUSAND/ÂΜL (ref 0–0.61)
EOSINOPHIL NFR BLD AUTO: 4 % (ref 0–6)
ERYTHROCYTE [DISTWIDTH] IN BLOOD BY AUTOMATED COUNT: 14.8 % (ref 11.6–15.1)
FOLATE SERPL-MCNC: 13.3 NG/ML
GFR SERPL CREATININE-BSD FRML MDRD: 43 ML/MIN/1.73SQ M
GLUCOSE P FAST SERPL-MCNC: 94 MG/DL (ref 65–99)
GLUCOSE SERPL-MCNC: 94 MG/DL (ref 65–140)
HCT VFR BLD AUTO: 42.6 % (ref 34.8–46.1)
HGB BLD-MCNC: 14.6 G/DL (ref 11.5–15.4)
IMM GRANULOCYTES # BLD AUTO: 0.05 THOUSAND/UL (ref 0–0.2)
IMM GRANULOCYTES NFR BLD AUTO: 1 % (ref 0–2)
LYMPHOCYTES # BLD AUTO: 3.08 THOUSANDS/ÂΜL (ref 0.6–4.47)
LYMPHOCYTES NFR BLD AUTO: 31 % (ref 14–44)
MAGNESIUM SERPL-MCNC: 1.8 MG/DL (ref 1.9–2.7)
MCH RBC QN AUTO: 34.1 PG (ref 26.8–34.3)
MCHC RBC AUTO-ENTMCNC: 34.3 G/DL (ref 31.4–37.4)
MCV RBC AUTO: 100 FL (ref 82–98)
MONOCYTES # BLD AUTO: 1.02 THOUSAND/ÂΜL (ref 0.17–1.22)
MONOCYTES NFR BLD AUTO: 10 % (ref 4–12)
NEUTROPHILS # BLD AUTO: 5.32 THOUSANDS/ÂΜL (ref 1.85–7.62)
NEUTS SEG NFR BLD AUTO: 53 % (ref 43–75)
NRBC BLD AUTO-RTO: 0 /100 WBCS
PHOSPHATE SERPL-MCNC: 3.6 MG/DL (ref 2.3–4.1)
PLATELET # BLD AUTO: 596 THOUSANDS/UL (ref 149–390)
PMV BLD AUTO: 10 FL (ref 8.9–12.7)
POTASSIUM SERPL-SCNC: 3.8 MMOL/L (ref 3.5–5.3)
RBC # BLD AUTO: 4.28 MILLION/UL (ref 3.81–5.12)
SL CV LV EF: 55
SODIUM SERPL-SCNC: 138 MMOL/L (ref 135–147)
WBC # BLD AUTO: 10.02 THOUSAND/UL (ref 4.31–10.16)

## 2024-02-09 PROCEDURE — 85730 THROMBOPLASTIN TIME PARTIAL: CPT | Performed by: PSYCHIATRY & NEUROLOGY

## 2024-02-09 PROCEDURE — 80048 BASIC METABOLIC PNL TOTAL CA: CPT | Performed by: INTERNAL MEDICINE

## 2024-02-09 PROCEDURE — B245ZZ4 ULTRASONOGRAPHY OF LEFT HEART, TRANSESOPHAGEAL: ICD-10-PCS | Performed by: STUDENT IN AN ORGANIZED HEALTH CARE EDUCATION/TRAINING PROGRAM

## 2024-02-09 PROCEDURE — B246ZZ4 ULTRASONOGRAPHY OF RIGHT AND LEFT HEART, TRANSESOPHAGEAL: ICD-10-PCS | Performed by: STUDENT IN AN ORGANIZED HEALTH CARE EDUCATION/TRAINING PROGRAM

## 2024-02-09 PROCEDURE — 93312 ECHO TRANSESOPHAGEAL: CPT | Performed by: STUDENT IN AN ORGANIZED HEALTH CARE EDUCATION/TRAINING PROGRAM

## 2024-02-09 PROCEDURE — 82746 ASSAY OF FOLIC ACID SERUM: CPT | Performed by: PHYSICIAN ASSISTANT

## 2024-02-09 PROCEDURE — 99232 SBSQ HOSP IP/OBS MODERATE 35: CPT | Performed by: STUDENT IN AN ORGANIZED HEALTH CARE EDUCATION/TRAINING PROGRAM

## 2024-02-09 PROCEDURE — 97162 PT EVAL MOD COMPLEX 30 MIN: CPT

## 2024-02-09 PROCEDURE — 93312 ECHO TRANSESOPHAGEAL: CPT

## 2024-02-09 PROCEDURE — 99232 SBSQ HOSP IP/OBS MODERATE 35: CPT | Performed by: PHYSICIAN ASSISTANT

## 2024-02-09 PROCEDURE — 85025 COMPLETE CBC W/AUTO DIFF WBC: CPT | Performed by: INTERNAL MEDICINE

## 2024-02-09 PROCEDURE — 83735 ASSAY OF MAGNESIUM: CPT | Performed by: INTERNAL MEDICINE

## 2024-02-09 PROCEDURE — 93320 DOPPLER ECHO COMPLETE: CPT | Performed by: STUDENT IN AN ORGANIZED HEALTH CARE EDUCATION/TRAINING PROGRAM

## 2024-02-09 PROCEDURE — 97166 OT EVAL MOD COMPLEX 45 MIN: CPT | Performed by: OCCUPATIONAL THERAPIST

## 2024-02-09 PROCEDURE — 99233 SBSQ HOSP IP/OBS HIGH 50: CPT | Performed by: PSYCHIATRY & NEUROLOGY

## 2024-02-09 PROCEDURE — 84100 ASSAY OF PHOSPHORUS: CPT | Performed by: INTERNAL MEDICINE

## 2024-02-09 PROCEDURE — 85730 THROMBOPLASTIN TIME PARTIAL: CPT | Performed by: INTERNAL MEDICINE

## 2024-02-09 PROCEDURE — 93325 DOPPLER ECHO COLOR FLOW MAPG: CPT | Performed by: STUDENT IN AN ORGANIZED HEALTH CARE EDUCATION/TRAINING PROGRAM

## 2024-02-09 RX ORDER — ALBUTEROL SULFATE 90 UG/1
2 AEROSOL, METERED RESPIRATORY (INHALATION) EVERY 4 HOURS PRN
Status: DISCONTINUED | OUTPATIENT
Start: 2024-02-09 | End: 2024-02-10 | Stop reason: HOSPADM

## 2024-02-09 RX ORDER — ALBUTEROL SULFATE 2.5 MG/3ML
2.5 SOLUTION RESPIRATORY (INHALATION) ONCE AS NEEDED
Status: DISCONTINUED | OUTPATIENT
Start: 2024-02-09 | End: 2024-02-09 | Stop reason: HOSPADM

## 2024-02-09 RX ORDER — PROPOFOL 10 MG/ML
INJECTION, EMULSION INTRAVENOUS AS NEEDED
Status: DISCONTINUED | OUTPATIENT
Start: 2024-02-09 | End: 2024-02-09

## 2024-02-09 RX ORDER — LANOLIN ALCOHOL/MO/W.PET/CERES
100 CREAM (GRAM) TOPICAL DAILY
Status: DISCONTINUED | OUTPATIENT
Start: 2024-02-09 | End: 2024-02-10 | Stop reason: HOSPADM

## 2024-02-09 RX ORDER — FOLIC ACID 1 MG/1
1 TABLET ORAL DAILY
Status: DISCONTINUED | OUTPATIENT
Start: 2024-02-09 | End: 2024-02-10 | Stop reason: HOSPADM

## 2024-02-09 RX ORDER — LIDOCAINE HYDROCHLORIDE 20 MG/ML
INJECTION, SOLUTION EPIDURAL; INFILTRATION; INTRACAUDAL; PERINEURAL AS NEEDED
Status: DISCONTINUED | OUTPATIENT
Start: 2024-02-09 | End: 2024-02-09

## 2024-02-09 RX ORDER — ONDANSETRON 2 MG/ML
4 INJECTION INTRAMUSCULAR; INTRAVENOUS ONCE AS NEEDED
Status: DISCONTINUED | OUTPATIENT
Start: 2024-02-09 | End: 2024-02-09 | Stop reason: HOSPADM

## 2024-02-09 RX ORDER — LOSARTAN POTASSIUM 25 MG/1
25 TABLET ORAL DAILY
Status: DISCONTINUED | OUTPATIENT
Start: 2024-02-10 | End: 2024-02-10 | Stop reason: HOSPADM

## 2024-02-09 RX ORDER — SODIUM CHLORIDE 9 MG/ML
50 INJECTION, SOLUTION INTRAVENOUS CONTINUOUS
Status: DISPENSED | OUTPATIENT
Start: 2024-02-09 | End: 2024-02-09

## 2024-02-09 RX ORDER — SODIUM CHLORIDE 9 MG/ML
INJECTION, SOLUTION INTRAVENOUS CONTINUOUS PRN
Status: DISCONTINUED | OUTPATIENT
Start: 2024-02-09 | End: 2024-02-09

## 2024-02-09 RX ORDER — LORAZEPAM 2 MG/ML
1 INJECTION INTRAMUSCULAR ONCE AS NEEDED
Status: COMPLETED | OUTPATIENT
Start: 2024-02-09 | End: 2024-02-10

## 2024-02-09 RX ADMIN — LOSARTAN POTASSIUM 12.5 MG: 25 TABLET, FILM COATED ORAL at 08:19

## 2024-02-09 RX ADMIN — ACETAMINOPHEN 325MG 650 MG: 325 TABLET ORAL at 21:10

## 2024-02-09 RX ADMIN — SODIUM CHLORIDE: 0.9 INJECTION, SOLUTION INTRAVENOUS at 12:14

## 2024-02-09 RX ADMIN — SODIUM CHLORIDE 50 ML/HR: 0.9 INJECTION, SOLUTION INTRAVENOUS at 15:31

## 2024-02-09 RX ADMIN — FOLIC ACID 1 MG: 1 TABLET ORAL at 10:39

## 2024-02-09 RX ADMIN — Medication 6 MG: at 21:10

## 2024-02-09 RX ADMIN — PROPOFOL 50 MG: 10 INJECTION, EMULSION INTRAVENOUS at 12:33

## 2024-02-09 RX ADMIN — PROPOFOL 130 MG: 10 INJECTION, EMULSION INTRAVENOUS at 12:25

## 2024-02-09 RX ADMIN — ATORVASTATIN CALCIUM 40 MG: 40 TABLET, FILM COATED ORAL at 17:57

## 2024-02-09 RX ADMIN — HYDRALAZINE HYDROCHLORIDE 5 MG: 20 INJECTION, SOLUTION INTRAMUSCULAR; INTRAVENOUS at 23:48

## 2024-02-09 RX ADMIN — Medication 1 TABLET: at 10:39

## 2024-02-09 RX ADMIN — ASPIRIN 81 MG CHEWABLE TABLET 81 MG: 81 TABLET CHEWABLE at 08:19

## 2024-02-09 RX ADMIN — METOPROLOL SUCCINATE 50 MG: 50 TABLET, EXTENDED RELEASE ORAL at 08:19

## 2024-02-09 RX ADMIN — THIAMINE HCL TAB 100 MG 100 MG: 100 TAB at 10:39

## 2024-02-09 RX ADMIN — HYDRALAZINE HYDROCHLORIDE 5 MG: 20 INJECTION, SOLUTION INTRAMUSCULAR; INTRAVENOUS at 15:02

## 2024-02-09 RX ADMIN — PROPOFOL 50 MG: 10 INJECTION, EMULSION INTRAVENOUS at 12:41

## 2024-02-09 RX ADMIN — PROPOFOL 50 MG: 10 INJECTION, EMULSION INTRAVENOUS at 12:38

## 2024-02-09 RX ADMIN — PROPOFOL 50 MG: 10 INJECTION, EMULSION INTRAVENOUS at 12:28

## 2024-02-09 RX ADMIN — SPIRONOLACTONE 25 MG: 25 TABLET, FILM COATED ORAL at 08:19

## 2024-02-09 RX ADMIN — LIDOCAINE HYDROCHLORIDE 80 MG: 20 INJECTION, SOLUTION EPIDURAL; INFILTRATION; INTRACAUDAL at 12:25

## 2024-02-09 RX ADMIN — HEPARIN SODIUM 14 UNITS/KG/HR: 10000 INJECTION, SOLUTION INTRAVENOUS at 03:26

## 2024-02-09 NOTE — ASSESSMENT & PLAN NOTE
Lab Results   Component Value Date    EGFR 43 02/09/2024    EGFR 43 02/08/2024    EGFR 34 02/08/2024    CREATININE 1.31 (H) 02/09/2024    CREATININE 1.30 02/08/2024    CREATININE 1.58 (H) 02/08/2024   Since 2022 Cr around 1.3-1.4   Within baseline currently

## 2024-02-09 NOTE — ASSESSMENT & PLAN NOTE
Presents for evaluation of CVA. Seen by cardiology group today in routine visit, she noted to them that 10 days ago she had an episode of confusion, difficulty speaking, left hand weakness all of which spontaneously resolved. A stat MRI was completed.   MRI 02/07/2024 reviewed: acute/subacute cortical infarcts in R precentral gyrus and parietal lobe, no significant edema, mass effect, hemorrhagic transformation. Chronic left ganglioscapular and corona radiata lacunar infarcts, chronic R basal ganglia lacunar infarct, mild microangiopathic changes  carotid duplex: Less than 50% stenosis left and right internal carotid arteries  Etiology suspected to be cardioembolic  MRI head and carotids pending  Echo: EF 55%, grade 1 diastolic dysfunction, mild TR  Spoke with cardiology, is currently on a heparin drip with eventual plan to transition to NOAC, timing per neurology  Plan for NATHAN today- NPO   Continue heparin drip at this time, aspirin 81 mg daily and atorvastatin 40 mg daily

## 2024-02-09 NOTE — UTILIZATION REVIEW
Continued Stay Review    OBS 02-07-24 @ 2306 CONVERTED TO INPATIENT 02-09-24 @ 1148 FOR CONTINUATION FOR CARE FOR CVA   Inpatient Admission  Once        Transfer Service: Hospitalist   Question Answer Comment   Level of Care Med Surg    Estimated length of stay More than 2 Midnights    Certification I certify that inpatient services are medically necessary for this patient for a duration of greater than two midnights. See H&P and MD Progress Notes for additional information about the patient's course of treatment.        02/09/24 1448     Date:02-09-24                          Current Patient Class: OBS  Current Level of Care: MED    HPI:63 y.o. female initially admitted on 02-07-24     Assessment/Plan: awaiting NATHAN results  remains in IV heparin gtt  aspirin 81 mg daily and atorvastatin 40 mg daily , speech delayed,weakness in left hand,  goal currently on a heparin drip with eventual plan to transition to NOAC, Assessment & Plan:    CVA (cerebral vascular accident) (HCC)  - Brain MRI 2/7/2024 showed an acute or subacute cortical infarcts in the right precentral gyrus and right parietal lobe, no significant edema, mass effect or hemorrhage, chronic left ganglial capsular and corona radiata particular infarcts, chronic right basal ganglia lacunar infarcts  - Carotid ultrasound 2/8/2024 showed less than 50% bilateral internal carotid artery stenosis  - Pending head and neck MRA    Chronic heart failure with recovered ejection fraction  - Nonischemic cardiomyopathy  - TTE 6/3/2022 showed EF 28%, grade 3 DD, mildly dilated RV, severe LA, dilated RA, positive PFO, mild MR, moderate TR, estimated PA pressure 56 mmHg  - LHC 6/6/2022 showed normal epicardial coronary arteries  -TTE 11/28/2022 showed EF 44%  - TTE 4/5/2023 showed EF 28% with bull's-eye pattern on strain imaging  - TTE 2/8/2024 showed EF 55%, grade 1 DD, mild LAE, mild TR  - Left ventricular systolic function appears recovered on recent echo     Hypertension  - Continue with Toprol-XL 50 mg daily, spironolactone 25 mg daily    Hyperlipidemia  - Continue atorvastatin 40 mg daily  - Also on aspirin 81 mg daily    Mediastinal lymphadenopathy  - Plan for CT chest abdomen pelvis to evaluate for malignancy    Alcohol ingestion, 1-4 drinks per day    Other emphysema (HCC)    CKD (chronic kidney disease) stage 3, GFR 30-59 ml/min (HCC)    Patent foramen ovale     Summary:  - NATHAN today showed a very small patent youssef ovale, so unlikely to be the cause of her CVA, and no atheroma or other cause for her CVA was identified on the NATHAN  - TTE yesterday showed recovery of LV function, so CVA is not from reduced LV function  - Pending head and neck MRA  - Pending CT chest abdomen pelvis  - Given the recovery of LV function, no need for NOAC  - If no other cause for her CVA is identified this hospitalization, will arrange for a 2-week Zio patch after discharge with possible loop recorder in the future to look for atrial fibrillation/flutter  - Continue to monitor on telemetry while inpatien    Vital Signs:  Date/Time Temp Pulse Resp BP MAP (mmHg) SpO2 O2 Device Patient Position - Orthostatic VS   02/09/24 0700 97.5 °F (36.4 °C) 67 16 155/96 111 94 % None (Room air) Lying   02/09/24 0321 98.3 °F (36.8 °C) 65 16 145/85 108 95 % None (Room air) Lying   02/08/24 2317 97.2 °F (36.2 °C) Abnormal  66 16 145/91 104 96 % None (Room air) Lying   02/08/24 1840 98.2 °F (36.8 °C) 72 16 135/79 100 95 % None (Room air) Sitting   02/08/24 1536 97.7 °F (36.5 °C) 65 18 126/81 99 94 % None (Room air) Sitting   02/08/24 1200 97.7 °F (36.5 °C) 70 18 140/90 70 97 % None (Room air) Lying   02/08/24 1100 97.6 °F (36.4 °C) 79 18 136/78 102 95 % None (Room air) Lying            Pertinent Labs/Diagnostic Results:   ECHO 02-08-24  Left Ventricle: Left ventricular cavity size is normal. Wall thickness is moderately increased. The left ventricular ejection fraction is 55%. Systolic function is  "normal. Global longitudinal strain is reduced at -13.4%. Wall motion is normal. Diastolic function is mildly abnormal, consistent with grade I (abnormal) relaxation.    Left Atrium: The atrium is moderately dilated.    Tricuspid Valve: There is mild regurgitation.    Compared to prior echocardiogram dated 4/5/2023, left ventricular systolic function has now significantly improved.      Results from last 7 days   Lab Units 02/09/24  0653 02/08/24  0543 02/08/24  0009   WBC Thousand/uL 10.02 10.47* 10.73*   HEMOGLOBIN g/dL 14.6 14.4 14.7   HEMATOCRIT % 42.6 42.4 43.0   PLATELETS Thousands/uL 596* 463* 622*   NEUTROS ABS Thousands/µL 5.32  --  6.57         Results from last 7 days   Lab Units 02/09/24  0653 02/08/24  0751 02/08/24  0009   SODIUM mmol/L 138 137 138   POTASSIUM mmol/L 3.8 3.8 4.4   CHLORIDE mmol/L 110* 109* 105   CO2 mmol/L 23 22 26   ANION GAP mmol/L 5 6 7   BUN mg/dL 24 26* 27*   CREATININE mg/dL 1.31* 1.30 1.58*   EGFR ml/min/1.73sq m 43 43 34   CALCIUM mg/dL 8.7 8.7 9.0   MAGNESIUM mg/dL 1.8*  --   --    PHOSPHORUS mg/dL 3.6  --   --      Results from last 7 days   Lab Units 02/08/24  0009   AST U/L 43*   ALT U/L 35   ALK PHOS U/L 136*   TOTAL PROTEIN g/dL 7.3   ALBUMIN g/dL 4.1   TOTAL BILIRUBIN mg/dL 0.24     Results from last 7 days   Lab Units 02/07/24  2214   POC GLUCOSE mg/dl 143*     Results from last 7 days   Lab Units 02/09/24  0653 02/08/24  0751 02/08/24  0009   GLUCOSE RANDOM mg/dL 94 88 94         Results from last 7 days   Lab Units 02/08/24  0543   HEMOGLOBIN A1C % 5.3   EAG mg/dl 105     No results found for: \"BETA-HYDROXYBUTYRATE\"                           Results from last 7 days   Lab Units 02/09/24  0325 02/08/24  2358 02/08/24  1829 02/08/24  0751 02/08/24  0009   PROTIME seconds  --   --   --   --  14.3   INR   --   --   --   --  1.09   PTT seconds 90* 50* 52*   < > 29    < > = values in this interval not displayed.       Medications:   Scheduled Medications:  aspirin, 81 mg, " Oral, Daily  atorvastatin, 40 mg, Oral, QPM  losartan, 12.5 mg, Oral, Daily  melatonin, 6 mg, Oral, HS  metoprolol succinate, 50 mg, Oral, Daily  spironolactone, 25 mg, Oral, Daily      Continuous IV Infusions:  heparin (porcine), 3-24 Units/kg/hr (Order-Specific), Intravenous, Titrated      PRN Meds:  acetaminophen, 650 mg, Oral, Q4H PRN  aluminum-magnesium hydroxide-simethicone, 30 mL, Oral, Q6H PRN  hydrALAZINE, 5 mg, Intravenous, Q6H PRN  ondansetron, 4 mg, Intravenous, Q6H PRN  polyethylene glycol, 17 g, Oral, Daily PRN        Discharge Plan: TBD      Network Utilization Review Department  ATTENTION: Please call with any questions or concerns to [phone] and carefully listen to the prompts so that you are directed to the right person. All voicemails are confidential.   For Discharge needs, contact Care Management DC Support Team at [phone] opt. [phone]  Send all requests for admission clinical reviews, approved or denied determinations and any other requests to dedicated fax number below belonging to the campus where the patient is receiving treatment. List of dedicated fax numbers for the Facilities:  FACILITY NAME UR FAX NUMBER   ADMISSION DENIALS (Administrative/Medical Necessity) [phone]   DISCHARGE SUPPORT TEAM (NETWORK) [phone]   PARENT CHILD HEALTH (Maternity/NICU/Pediatrics) [phone]   [facility] [phone]   [facility] [phone]   [facility] [phone]   General acute hospital [phone]   [facility] [phone]   [facility] [phone]   [facility] [phone]   [facility] [phone]   [facility] [phone]   Replaced by [facility] [phone]   [facility] [phone]   [facility]  [facility] [phone]

## 2024-02-09 NOTE — QUICK NOTE
Copay for Eliquis $0 per CM after discussion with pharmacy.   Transition to DOAC once cleared to do so from Neurology standpoint

## 2024-02-09 NOTE — PROGRESS NOTES
[facility]  Progress Note  Name: Ellie Lazo I  MRN: [id number]  Unit/Bed#: E4 -01 I Date of Admission: 2/7/2024   Date of Service: 2/9/2024 I Hospital Day: 0    Assessment/Plan   * CVA (cerebral vascular accident) (HCC)  Assessment & Plan  Presents for evaluation of CVA. Seen by cardiology group today in routine visit, she noted to them that 10 days ago she had an episode of confusion, difficulty speaking, left hand weakness all of which spontaneously resolved. A stat MRI was completed.   MRI 02/07/2024 reviewed: acute/subacute cortical infarcts in R precentral gyrus and parietal lobe, no significant edema, mass effect, hemorrhagic transformation. Chronic left ganglioscapular and corona radiata lacunar infarcts, chronic R basal ganglia lacunar infarct, mild microangiopathic changes  carotid duplex: Less than 50% stenosis left and right internal carotid arteries  Etiology suspected to be cardioembolic  MRI head and carotids pending  Echo: EF 55%, grade 1 diastolic dysfunction, mild TR  Spoke with cardiology, is currently on a heparin drip with eventual plan to transition to NOAC, timing per neurology  I will run the cost of eliquis today   Plan for NATHAN today- NPO   Continue heparin drip at this time, aspirin 81 mg daily and atorvastatin 40 mg daily        CKD (chronic kidney disease) stage 3, GFR 30-59 ml/min ([facility])  Assessment & Plan  Lab Results   Component Value Date    EGFR 43 02/09/2024    EGFR 43 02/08/2024    EGFR 34 02/08/2024    CREATININE 1.31 (H) 02/09/2024    CREATININE 1.30 02/08/2024    CREATININE 1.58 (H) 02/08/2024   Since 2022 Cr around 1.3-1.4   Within baseline currently     Chronic systolic heart failure (HCC)  Assessment & Plan  Wt Readings from Last 3 Encounters:   02/08/24 63.2 kg (139 lb 5.3 oz)   02/07/24 61.2 kg (135 lb)   06/05/23 60.3 kg (133 lb)     Likely nonischemic, chronic daily alcohol use, questionable amyloid. Follows with [facility] heart failure  group. Appears compensated   TTE 04/2023 reviewed: LVEF 28% severely reduced, global longitudinal strain with an apical sparing bulls-eye pattern, severe global hypokinesis, diastolic dysfunction severely abnormal, severe biatrial dilation, patent foramen ovale with left to R shunting, mild to moderate MR, mild to moderate TR, PASP 49mmHg   Going for NATHAN today  Continue Toprol-XL 50 mg daily, losartan 12.5 mg daily, Aldactone 25 mg daily, aspirin 81 mg daily, Lipitor 40 mg daily  Did not tolerate Jardiance, Entresto stopped due to lightheadedness, takes Lasix 20 mg as needed  Outpatient cardiac MRI      Other emphysema (HCC)  Assessment & Plan  History of tobacco/marijuana use. Reports quitting tobacco. No current exacerbation  PRN albuterol    Alcohol ingestion, 1-4 drinks per day  Assessment & Plan  Reports daily alcohol ingestion  Monitor for withdrawal  Oral thiamine, folic acid and multivitamin added    Mediastinal lymphadenopathy  Assessment & Plan  Patient had CTA chest in April 2023 with stable mild right paratracheal lymphadenopathy and enlarged subcarinal node since April 2023. Stability over one year suggests a benign etiology.     Non-ischemic cardiomyopathy (HCC)  Assessment & Plan  See plan above   Cardiology on board              VTE Pharmacologic Prophylaxis: VTE Score: 8 High Risk (Score >/= 5) - Pharmacological DVT Prophylaxis Ordered: heparin drip. Sequential Compression Devices Ordered.    Mobility:   Basic Mobility Inpatient Raw Score: 24  JH-HLM Goal: 8: Walk 250 feet or more  JH-HLM Achieved: 8: Walk 250 feet ot more  HLM Goal achieved. Continue to encourage appropriate mobility.    Patient Centered Rounds: I performed bedside rounds with nursing staff today.   Discussions with Specialists or Other Care Team Provider: Dr. Montano     Education and Discussions with Family / Patient: Patient declined call to .     Total Time Spent on Date of Encounter in care of patient:  mins.  This time was spent on one or more of the following: performing physical exam; counseling and coordination of care; obtaining or reviewing history; documenting in the medical record; reviewing/ordering tests, medications or procedures; communicating with other healthcare professionals and discussing with patient's family/caregivers.    Current Length of Stay: 0 day(s)  Current Patient Status: Observation   Certification Statement: The patient will continue to require additional inpatient hospital stay due to cva   Discharge Plan:  pending clearance by neuroogy and completed MRA's     Code Status: Level 1 - Full Code    Subjective:   Patient doing well.  He states has not slept well in the hospital.  Denies any chest pain shortness of breath lightheadedness dizziness headache.  No abdominal pain nausea vomiting.  She reports may be slight weakness in left hand but no weakness in left leg.    Objective:     Vitals:   Temp (24hrs), Av.7 °F (36.5 °C), Min:97.2 °F (36.2 °C), Max:98.3 °F (36.8 °C)    Temp:  [97.2 °F (36.2 °C)-98.3 °F (36.8 °C)] 97.5 °F (36.4 °C)  HR:  [65-79] 67  Resp:  [16-18] 16  BP: (126-155)/(78-96) 155/96  SpO2:  [94 %-97 %] 94 %  Body mass index is 25.48 kg/m².     Input and Output Summary (last 24 hours):   No intake or output data in the 24 hours ending 24 1036    Physical Exam:   Physical Exam  Vitals and nursing note reviewed.   Cardiovascular:      Rate and Rhythm: Normal rate and regular rhythm.   Pulmonary:      Effort: Pulmonary effort is normal. No respiratory distress.      Breath sounds: Normal breath sounds.   Abdominal:      General: Bowel sounds are normal.      Palpations: Abdomen is soft.   Musculoskeletal:      Right lower leg: No edema.      Left lower leg: No edema.   Neurological:      Mental Status: She is alert.      Comments: No focal deficits noted, reports slightly weaker left hand , no facial droop or asymmetry     Psychiatric:         Mood and Affect: Mood  normal.          Additional Data:     Labs:  Results from last 7 days   Lab Units 02/09/24  0653   WBC Thousand/uL 10.02   HEMOGLOBIN g/dL 14.6   HEMATOCRIT % 42.6   PLATELETS Thousands/uL 596*   NEUTROS PCT % 53   LYMPHS PCT % 31   MONOS PCT % 10   EOS PCT % 4     Results from last 7 days   Lab Units 02/09/24  0653 02/08/24  0751 02/08/24  0009   SODIUM mmol/L 138   < > 138   POTASSIUM mmol/L 3.8   < > 4.4   CHLORIDE mmol/L 110*   < > 105   CO2 mmol/L 23   < > 26   BUN mg/dL 24   < > 27*   CREATININE mg/dL 1.31*   < > 1.58*   ANION GAP mmol/L 5   < > 7   CALCIUM mg/dL 8.7   < > 9.0   ALBUMIN g/dL  --   --  4.1   TOTAL BILIRUBIN mg/dL  --   --  0.24   ALK PHOS U/L  --   --  136*   ALT U/L  --   --  35   AST U/L  --   --  43*   GLUCOSE RANDOM mg/dL 94   < > 94    < > = values in this interval not displayed.     Results from last 7 days   Lab Units 02/08/24  0009   INR  1.09     Results from last 7 days   Lab Units 02/07/24  2214   POC GLUCOSE mg/dl 143*     Results from last 7 days   Lab Units 02/08/24  0543   HEMOGLOBIN A1C % 5.3           Lines/Drains:  Invasive Devices       Peripheral Intravenous Line  Duration             Peripheral IV 02/08/24 Right;Ventral (anterior) Forearm 1 day                      Telemetry:  Telemetry Orders (From admission, onward)               24 Hour Telemetry Monitoring  Continuous x 24 Hours (Telem)        Question:  Reason for 24 Hour Telemetry  Answer:  TIA/Suspected CVA/ Confirmed CVA                                Imaging: Reviewed radiology reports from this admission including: MRI brain and ECHO    Recent Cultures (last 7 days):         Last 24 Hours Medication List:   Current Facility-Administered Medications   Medication Dose Route Frequency Provider Last Rate    acetaminophen  650 mg Oral Q4H PRN Rojas Brenner PA-C      albuterol  2 puff Inhalation Q4H PRN Lillian Cruz PA-C      aluminum-magnesium hydroxide-simethicone  30 mL Oral Q6H PRN Rojas Brenner PA-C      aspirin   81 mg Oral Daily Regina Weston MD      atorvastatin  40 mg Oral QPM Rojas Brenner PA-C      folic acid  1 mg Oral Daily Lillian Cruz PA-C      heparin (porcine)  3-24 Units/kg/hr (Order-Specific) Intravenous Titrated Rojas Brenner PA-C 12 Units/kg/hr (02/09/24 0817)    hydrALAZINE  5 mg Intravenous Q6H PRN Rojas Brenner PA-C      losartan  12.5 mg Oral Daily Rojas Brenner PA-C      melatonin  6 mg Oral HS Mackenzie Smith PA-C      metoprolol succinate  50 mg Oral Daily Rojas Brenner PA-C      multivitamin-minerals  1 tablet Oral Daily Lillian Cruz PA-C      ondansetron  4 mg Intravenous Q6H PRN Rojas Brenner PA-C      polyethylene glycol  17 g Oral Daily PRN Rojas Brenner PA-C      spironolactone  25 mg Oral Daily Rojas Brenner PA-C      thiamine  100 mg Oral Daily Lillian Cruz PA-C          Today, Patient Was Seen By: Lillian Cruz PA-C    **Please Note: This note may have been constructed using a voice recognition system.**

## 2024-02-09 NOTE — ASSESSMENT & PLAN NOTE
Wt Readings from Last 3 Encounters:   02/08/24 63.2 kg (139 lb 5.3 oz)   02/07/24 61.2 kg (135 lb)   06/05/23 60.3 kg (133 lb)     Likely nonischemic, chronic daily alcohol use, questionable amyloid. Follows with [facility] heart failure group. Appears compensated   TTE 04/2023 reviewed: LVEF 28% severely reduced, global longitudinal strain with an apical sparing bulls-eye pattern, severe global hypokinesis, diastolic dysfunction severely abnormal, severe biatrial dilation, patent foramen ovale with left to R shunting, mild to moderate MR, mild to moderate TR, PASP 49mmHg   Going for NATHAN today  Continue Toprol-XL 50 mg daily, losartan 12.5 mg daily, Aldactone 25 mg daily, aspirin 81 mg daily, Lipitor 40 mg daily  Did not tolerate Jardiance, Entresto stopped due to lightheadedness, takes Lasix 20 mg as needed  Outpatient cardiac MRI

## 2024-02-09 NOTE — OCCUPATIONAL THERAPY NOTE
Occupational Therapy Evaluation     Patient Name: Ellie Lazo  Today's Date: 2/9/2024  Problem List  Principal Problem:    CVA (cerebral vascular accident) (HCC)  Active Problems:    Non-ischemic cardiomyopathy (HCC)    Mediastinal lymphadenopathy    Alcohol ingestion, 1-4 drinks per day    Other emphysema (HCC)    Chronic systolic heart failure (HCC)    CKD (chronic kidney disease) stage 3, GFR 30-59 ml/min (HCC)    Patent foramen ovale    Past Medical History  Past Medical History:   Diagnosis Date    CHF (congestive heart failure) (HCC)     Hypertension      Past Surgical History  Past Surgical History:   Procedure Laterality Date    CARDIAC CATHETERIZATION N/A 6/6/2022    Procedure: Cardiac catheterization;  Surgeon: Donta Guarddao MD;  Location: [facility] CARDIAC [facility];  Service: Cardiology    CERVICAL CONE BIOPSY      FL GUIDED NEEDLE PLAC BX/ASP/INJ  10/21/2020    HIP SURGERY      HIP SURGERY           02/09/24 0902   OT Last Visit   OT Visit Date 02/09/24   Note Type   Note type Evaluation   Pain Assessment   Pain Assessment Tool 0-10   Pain Score No Pain   Restrictions/Precautions   Other Precautions Multiple lines;Telemetry;Fall Risk   Home Living   Type of Home House   Home Layout Two level;1/2 bath on main level;Bed/bath upstairs;Stairs to enter with rails   Bathroom Shower/Tub Walk-in shower   Additional Comments 3 GEORGE, no DME or AD   Prior Function   Level of [place] Independent with ADLs;Independent with functional mobility;Needs assistance with IADLS   Lives With Spouse   Receives Help From Family   IADLs Independent with meal prep;Family/Friend/Other provides medication management;Independent with driving  (daughter assists with medication mgmt)   Falls in the last 6 months 0   Vocational Retired   Comments Independent ADLS and functional mob without AD   General   Additional Pertinent History 63-year-old female who presents after receiving results from an outpatient MRI.  10 days ago,  patient had an episode of confusion, difficulty speaking, left upper extremity weakness.  The symptoms have subsequently resolved except for some slight left upper extremity weakness per the patient.  Seen by cardiology today.  Patient reported the symptoms.  She was sent for a stat MRI which essentially showed acute/subacute cortical infarcts.  She was sent to the emergency department for evaluation.   Subjective   Subjective I'm doing fine   ADL   Grooming Assistance 7  Independent   UB Bathing Assistance 7  Independent   LB Bathing Assistance 7  Independent   UB Dressing Assistance 7  Independent   LB Dressing Assistance 7  Independent   Toileting Assistance  7  Independent   Bed Mobility   Supine to Sit 6  Modified independent   Additional items HOB elevated;Bedrails   Sit to Supine 6  Modified independent   Additional items Bedrails;HOB elevated   Transfers   Sit to Stand 7  Independent   Stand to Sit 7  Independent   Functional Mobility   Functional Mobility 7  Independent   Additional Comments no AD   Balance   Static Sitting Good   Dynamic Sitting Fair +   Static Standing Fair +   Dynamic Standing Fair   Ambulatory Fair   Activity Tolerance   Activity Tolerance Patient tolerated treatment well   Medical Staff Made Aware PT   Nurse Made Aware yes   RUE Assessment   RUE Assessment WFL  (MS grossly 4/5)   LUE Assessment   LUE Assessment WFL  (MS grossly 4/5)   Hand Function   Gross Motor Coordination Functional   Fine Motor Coordination Functional   Psychosocial   Psychosocial (WDL) WDL   Cognition   Overall Cognitive Status WFL   Arousal/Participation Alert;Responsive;Arousable   Attention Within functional limits   Orientation Level Oriented X4   Memory Within functional limits   Following Commands Follows all commands and directions without difficulty   Assessment   Prognosis Good   Assessment Ellie Lazo is a 63 y.o. female admitted to [facility] on 2/7/2024 for CVA (cerebral vascular accident) (HCC). was  consulted and pt was seen on 2/9/2024 for ADL assessment and d/c planning. Pt presents w low fall risk, multiple lines. At baseline is indep without an AD. Pt is currently functioning at an indep level for bed mobility, transfers. functional mobility, dressing and toileting tasks.  Pt denies any acute deficits impacting function. Appears to be functioning at baseline and does not demonstrate need for continued IPPT services. The patient's AM-PAC ADL Raw Score is 24. A Raw score of greater than 16 suggests the patient may benefit from discharge to home.   Plan   Goal Expiration Date 02/09/24   OT Treatment Day 0   OT Frequency Eval only   Discharge Recommendation   Rehab Resource Intensity Level, OT No post-acute rehabilitation needs   AM-PAC Daily Activity Inpatient   Lower Body Dressing 4   Bathing 4   Toileting 4   Upper Body Dressing 4   Grooming 4   Eating 4   Daily Activity Raw Score 24   Daily Activity Standardized Score (Calc for Raw Score >=11) 57.54   AM-PAC Applied Cognition Inpatient   Following a Speech/Presentation 4   Understanding Ordinary Conversation 4   Taking Medications 3   Remembering Where Things Are Placed or Put Away 4   Remembering List of 4-5 Errands 4   Taking Care of Complicated Tasks 4   Applied Cognition Raw Score 23   Applied Cognition Standardized Score 53.08     Yolanda Jimenez, MS, OTR/L CLT

## 2024-02-09 NOTE — ANESTHESIA POSTPROCEDURE EVALUATION
Post-Op Assessment Note    CV Status:  Stable    Pain management: adequate       Mental Status:  Alert and awake   Hydration Status:  Euvolemic   PONV Controlled:  Controlled   Airway Patency:  Patent     Post Op Vitals Reviewed: Yes    No anethesia notable event occurred.    Staff: Anesthesiologist

## 2024-02-09 NOTE — PLAN OF CARE
Problem: PAIN - ADULT  Goal: Verbalizes/displays adequate comfort level or baseline comfort level  Description: Interventions:  - Encourage patient to monitor pain and request assistance  - Assess pain using appropriate pain scale  - Administer analgesics based on type and severity of pain and evaluate response  - Implement non-pharmacological measures as appropriate and evaluate response  - Consider cultural and social influences on pain and pain management  - Notify physician/advanced practitioner if interventions unsuccessful or patient reports new pain  Outcome: Progressing     Problem: INFECTION - ADULT  Goal: Absence or prevention of progression during hospitalization  Description: INTERVENTIONS:  - Assess and monitor for signs and symptoms of infection  - Monitor lab/diagnostic results  - Monitor all insertion sites, i.e. indwelling lines, tubes, and drains  - Monitor endotracheal if appropriate and nasal secretions for changes in amount and color  - [place] appropriate cooling/warming therapies per order  - Administer medications as ordered  - Instruct and encourage patient and family to use good hand hygiene technique  - Identify and instruct in appropriate isolation precautions for identified infection/condition  Outcome: Progressing     Problem: SAFETY ADULT  Goal: Patient will remain free of falls  Description: INTERVENTIONS:  - Educate patient/family on patient safety including physical limitations  - Instruct patient to call for assistance with activity   - Consult OT/PT to assist with strengthening/mobility   - Keep Call bell within reach  - Keep bed low and locked with side rails adjusted as appropriate  - Keep care items and personal belongings within reach  - Initiate and maintain comfort rounds  - Make Fall Risk Sign visible to staff  - Offer Toileting every 2 Hours, in advance of need  - Initiate/Maintain bed alarm  - Obtain necessary fall risk management equipment: bed alarm  - Apply yellow  socks and bracelet for high fall risk patients  - Consider moving patient to room near nurses station  Outcome: Progressing  Goal: Maintain or return to baseline ADL function  Description: INTERVENTIONS:  -  Assess patient's ability to carry out ADLs; assess patient's baseline for ADL function and identify physical deficits which impact ability to perform ADLs (bathing, care of mouth/teeth, toileting, grooming, dressing, etc.)  - Assess/evaluate cause of self-care deficits   - Assess range of motion  - Assess patient's mobility; develop plan if impaired  - Assess patient's need for assistive devices and provide as appropriate  - Encourage maximum independence but intervene and supervise when necessary  - Involve family in performance of ADLs  - Assess for home care needs following discharge   - Consider OT consult to assist with ADL evaluation and planning for discharge  - Provide patient education as appropriate  Outcome: Progressing  Goal: Maintains/Returns to pre admission functional level  Description: INTERVENTIONS:  - Perform AM-PAC 6 Click Basic Mobility/ Daily Activity assessment daily.  - Set and communicate daily mobility goal to care team and patient/family/caregiver.   - Collaborate with rehabilitation services on mobility goals if consulted  - Out of bed for toileting  - Record patient progress and toleration of activity level   Outcome: Progressing     Problem: DISCHARGE PLANNING  Goal: Discharge to home or other facility with appropriate resources  Description: INTERVENTIONS:  - Identify barriers to discharge w/patient and caregiver  - Arrange for needed discharge resources and transportation as appropriate  - Identify discharge learning needs (meds, wound care, etc.)  - Arrange for interpretive services to assist at discharge as needed  - Refer to Case Management Department for coordinating discharge planning if the patient needs post-hospital services based on physician/advanced practitioner order  or complex needs related to functional status, cognitive ability, or social support system  Outcome: Progressing     Problem: Knowledge Deficit  Goal: Patient/family/caregiver demonstrates understanding of disease process, treatment plan, medications, and discharge instructions  Description: Complete learning assessment and assess knowledge base.  Interventions:  - Provide teaching at level of understanding  - Provide teaching via preferred learning methods  Outcome: Progressing     Problem: NEUROSENSORY - ADULT  Goal: Achieves stable or improved neurological status  Description: INTERVENTIONS  - Monitor and report changes in neurological status  - Monitor vital signs such as temperature, blood pressure, glucose, and any other labs ordered   - Initiate measures to prevent increased intracranial pressure  - Monitor for seizure activity and implement precautions if appropriate      Outcome: Progressing     Problem: CARDIOVASCULAR - ADULT  Goal: Absence of cardiac dysrhythmias or at baseline rhythm  Description: INTERVENTIONS:  - Continuous cardiac monitoring, vital signs, obtain 12 lead EKG if ordered  - Administer antiarrhythmic and heart rate control medications as ordered  - Monitor electrolytes and administer replacement therapy as ordered  Outcome: Progressing

## 2024-02-09 NOTE — CASE MANAGEMENT
Case Management Assessment & Discharge Planning Note    Patient name Ellie Lazo  Location East 4 /E4 -* MRN [id number]  : 1960 Date 2024       Current Admission Date: 2024  Current Admission Diagnosis:CVA (cerebral vascular accident) (HCC)   Patient Active Problem List    Diagnosis Date Noted    CKD (chronic kidney disease) stage 3, GFR 30-59 ml/min (HCC) 2024    Patent foramen ovale 2024    CVA (cerebral vascular accident) (HCC) 2024    Chronic systolic heart failure (HCC) 2023    Tooth infection 2022    Other emphysema (HCC) 2022    Non-ischemic cardiomyopathy (HCC) 2022    Mediastinal lymphadenopathy 2022    Alcohol ingestion, 1-4 drinks per day 2022    Chronic hip pain after total replacement of right hip joint 2020      LOS (days): 0  Geometric Mean LOS (GMLOS) (days):   Days to GMLOS:     OBJECTIVE:              Current admission status: Inpatient       [facility]erred Pharmacy:   [id number] [address] [facility].  [address] [place] [address].  [facility] [address]  Phone: [phone] Fax: [phone]    Primary Care Provider: No primary care provider on file.    Primary Insurance: [employer] [facility] [place] [facility]  Secondary Insurance:     ASSESSMENT:  Active Health Care Proxies       JUDY [facility] Representative - Daughter   Primary Phone: [phone] (Mobile)                 Advance Directives  Does patient have a Health Care POA?: Yes  Does patient have Advance Directives?: No  Was patient offered paperwork?: Yes    Patient Information  Admitted from:: Home  Mental Status: Alert  During Assessment patient was accompanied by: Not accompanied during assessment  Assessment information provided by:: Patient  Support Systems: Self, Family members, Daughter  County of Residence: [place]  What city do you live in?: [place]  Home entry access options. Select all that apply.:  Stairs  Number of steps to enter home.: 3  Do the steps have railings?: Yes  Type of Current Residence: 2 story home  Living Arrangements: Lives w/ Spouse/significant other    Activities of Daily Living Prior to Admission  Functional Status: Independent  Completes ADLs independently?: Yes  Ambulates independently?: Yes  Does patient use assisted devices?: No  Does patient currently own DME?: Yes (has a cane and walker but doesnt use)  What DME does the patient currently own?: Walker, Straight Cane  Does patient have a history of Outpatient Therapy (PT/OT)?: Yes  Does the patient have a history of Short-Term Rehab?: Yes  Does patient have a history of HHC?: Yes  Does patient currently have HHC?: No       Patient Information Continued  Does patient have prescription coverage?: Yes  Does patient receive dialysis treatments?: No  Does patient have a history of substance abuse?: No    Means of Transportation  Means of Transport to Appts:: Family transport      Housing Stability: Low Risk  (2/9/2024)    Housing Stability Vital Sign     Unable to Pay for Housing in the Last Year: No     Number of Places Lived in the Last Year: 1     Unstable Housing in the Last Year: No   Food Insecurity: No Food Insecurity (2/9/2024)    Hunger Vital Sign     Worried About Running Out of Food in the Last Year: Never true     Ran Out of Food in the Last Year: Never true   Transportation Needs: No Transportation Needs (2/9/2024)    PRAPARE - Transportation     Lack of Transportation (Medical): No     Lack of Transportation (Non-Medical): No   Utilities: Not At Risk (2/9/2024)    AHC Utilities     Threatened with loss of utilities: No       DISCHARGE DETAILS:    Discharge planning discussed with:: patient        CM contacted family/caregiver?: No- see comments (patient is alert)       Requested Home Health Care         Is the patient interested in HHC at discharge?: No    DME Referral Provided  Referral made for DME?: No    Other  Referral/Resources/Interventions Provided:  Interventions: None Indicated       Treatment Team Recommendation: Home  Discharge Destination Plan:: Home  Transport at Discharge : Automobile, Family     Transfer Mode: Ambulate    Additional Comments: RNCm met with patient to discuss discharge planning. Patient was alone in the room and agreeable to assessment. James lives with  in a 2 story home. She denies using DME but does have a cane and walker from previous hip surgery. She has had HH and ARU at [facility] s/p hip surgery a few years back. At this time she denies any CM d/c needs and states family will provide discharge transport home. Eliquis copay check at [facility] [employer]  [phone] states $0 copay for Eliquis. RNCM informed patient of this as well. Per the pharmacist, they cannot ashley until 1/22/24 because RX was written for that date. RNCM informed SUZIE aBker. RNCM following for discharge planning. Patient doesn't have a PCP listed on profile but states she has OP f/u with someone she knows personally.

## 2024-02-09 NOTE — ASSESSMENT & PLAN NOTE
Patient had CTA chest in April 2023 with stable mild right paratracheal lymphadenopathy and enlarged subcarinal node since April 2023. Stability over one year suggests a benign etiology.

## 2024-02-09 NOTE — ASSESSMENT & PLAN NOTE
Reports daily alcohol ingestion  Monitor for withdrawal  Oral thiamine, folic acid and multivitamin added

## 2024-02-09 NOTE — PROGRESS NOTES
Progress Note - Neurology   Ellie Lazo 63 y.o. female MRN: [id number]  Unit/Bed#: E4 -01 Encounter: [id number]    Assessment/Plan   * CVA (cerebral vascular accident) (HCC)  Assessment & Plan  60-year-old female with past medical history of hypertension, cardiomyopathy, PFO, history of tobacco abuse, COPD, and alcohol use.  Patient was seen by her cardiologist and MRI was ordered secondary to 10 days prior of an episode of confusion, difficulty speaking and left arm and hand weakness.  MRI revealed an acute to subacute cortical infarct in the right precentral gyrus and right parietal lobe.  The patient presented to [facility] for further evaluation.    Acute/subacute cortical infarcts in the right precentral gyrus and right parietal lobe -suspect cardioembolic etiology cannot exclude vessel vessel atheroma  CHF - TTE 04/2023 reviewed: LVEF 28% severely reduced, global longitudinal strain with an apical sparing bulls-eye pattern, severe global hypokinesis, diastolic dysfunction severely abnormal, severe biatrial dilation, patent foramen ovale with left to R shunting, mild to moderate MR, mild to moderate TR.  Most recent Echo Ef of 55%.  COPD  Tobacco use  Alcohol use  Hypertension    - Stroke pathway  MRA of head and neck, gfr was 34  Most recent echo with an ef of 55%, left atrium is moderately dilated.  NATHAN recommended by cardiology.   Carotid doppler less than 50% bilaterally.   Lipid Panel - LDL 96  Hemoglobin A1c 5.3  Aspirin 81 mg   Started on acute stroke heparin drip, monitor APTT closely (reviewed with attending)  Atorvastatin 40 mg  Normotensive blood pressures  Continue telemetry, monitor for afib  PT/OT/ST  Frequent neuro checks. Continue to monitor and notify neurology with any changes.   - Medical management and supportive care per primary team. Correction of any metabolic or infectious disturbances.    - Reviewed with attending, plan of care per attending physician.      Ellie Lazo will  "need follow up in in 4 weeks with neurovascular attending. She will not require outpatient neurological testing.    Subjective:   Neurological follow up.   No events overnight, no new neurological complaints.   No ha, one sided weakness or numbness or ataxia, no problems with speech or swallowing.  No new complaints.   Reviewed testing that will be completed with her today.     Vitals: Blood pressure 148/93, pulse 80, temperature 97.9 °F (36.6 °C), temperature source Temporal, resp. rate 16, height 5' 2\" (1.575 m), weight 63.2 kg (139 lb 5.3 oz), SpO2 96%, currently breastfeeding.,Body mass index is 25.48 kg/m².    Current Facility-Administered Medications   Medication Dose Route Frequency Provider Last Rate    acetaminophen  650 mg Oral Q4H PRN Rojas Brenner PA-C      albuterol  2 puff Inhalation Q4H PRN Lillian Cruz PA-C      aluminum-magnesium hydroxide-simethicone  30 mL Oral Q6H PRN Rojas Brenner PA-C      aspirin  81 mg Oral Daily Regina Weston MD      atorvastatin  40 mg Oral QPM Rojas Brenner PA-C      folic acid  1 mg Oral Daily Lillian Cruz PA-C      heparin (porcine)  3-24 Units/kg/hr (Order-Specific) Intravenous Titrated Rojas Brenner PA-C 12 Units/kg/hr (02/09/24 1214)    hydrALAZINE  5 mg Intravenous Q6H PRN Rojas Brenner PA-C      LORazepam  1 mg Intravenous Once PRN Lillian Cruz PA-C      losartan  12.5 mg Oral Daily Rojas Brenner PA-C      melatonin  6 mg Oral HS Mackenzie Smith PA-C      metoprolol succinate  50 mg Oral Daily Rojas Brenner PA-C      multivitamin-minerals  1 tablet Oral Daily Lillian Cruz PA-C      ondansetron  4 mg Intravenous Q6H PRN Rojas Brenner PA-C      polyethylene glycol  17 g Oral Daily PRN Rojas Brenner PA-C      spironolactone  25 mg Oral Daily Rojas Brenner PA-C      thiamine  100 mg Oral Daily Lillian Cruz PA-C        Physical Exam:   Neurological  Mental status - the patient is awake alert oriented x 3, with no evidence of aphasia or dysarthria, " patient is able to follow simple commands, is able to follow complex commands.  No para-phasic errors noted. She is able to read and repeat, recognize objects, she has good insight and is a good historian, normal concentration and attn.  She is cooperative and pleasant  Cranial nerves 2 through 12 are intact  Motor - 5/5 upper extremities and lower extremities, without drift, normal tone and bulk.  No drift in the upper or lowers, non focal motor examination.   No tremor observed.   Sensation - non-focal to touch, or temperature.   Coordination - no ataxia noted in the uppers today.   No evidence of seizure activity, observed.       Lab, Imaging and other studies    Recent Results (from the past 24 hour(s))   APTT    Collection Time: 02/08/24  1:18 PM   Result Value Ref Range    PTT 61 (H) 23 - 37 seconds   APTT    Collection Time: 02/08/24  6:29 PM   Result Value Ref Range    PTT 52 (H) 23 - 37 seconds   APTT    Collection Time: 02/08/24 11:58 PM   Result Value Ref Range    PTT 50 (H) 23 - 37 seconds   APTT    Collection Time: 02/09/24  3:25 AM   Result Value Ref Range    PTT 90 (H) 23 - 37 seconds   Basic metabolic panel    Collection Time: 02/09/24  6:53 AM   Result Value Ref Range    Sodium 138 135 - 147 mmol/L    Potassium 3.8 3.5 - 5.3 mmol/L    Chloride 110 (H) 96 - 108 mmol/L    CO2 23 21 - 32 mmol/L    ANION GAP 5 mmol/L    BUN 24 5 - 25 mg/dL    Creatinine 1.31 (H) 0.60 - 1.30 mg/dL    Glucose 94 65 - 140 mg/dL    Glucose, Fasting 94 65 - 99 mg/dL    Calcium 8.7 8.4 - 10.2 mg/dL    eGFR 43 ml/min/1.73sq m   CBC and differential    Collection Time: 02/09/24  6:53 AM   Result Value Ref Range    WBC 10.02 4.31 - 10.16 Thousand/uL    RBC 4.28 3.81 - 5.12 Million/uL    Hemoglobin 14.6 11.5 - 15.4 g/dL    Hematocrit 42.6 34.8 - 46.1 %     (H) 82 - 98 fL    MCH 34.1 26.8 - 34.3 pg    MCHC 34.3 31.4 - 37.4 g/dL    RDW 14.8 11.6 - 15.1 %    MPV 10.0 8.9 - 12.7 fL    Platelets 596 (H) 149 - 390 Thousands/uL     nRBC 0 /100 WBCs    Neutrophils Relative 53 43 - 75 %    Immat GRANS % 1 0 - 2 %    Lymphocytes Relative 31 14 - 44 %    Monocytes Relative 10 4 - 12 %    Eosinophils Relative 4 0 - 6 %    Basophils Relative 1 0 - 1 %    Neutrophils Absolute 5.32 1.85 - 7.62 Thousands/µL    Immature Grans Absolute 0.05 0.00 - 0.20 Thousand/uL    Lymphocytes Absolute 3.08 0.60 - 4.47 Thousands/µL    Monocytes Absolute 1.02 0.17 - 1.22 Thousand/µL    Eosinophils Absolute 0.41 0.00 - 0.61 Thousand/µL    Basophils Absolute 0.14 (H) 0.00 - 0.10 Thousands/µL   Magnesium    Collection Time: 02/09/24  6:53 AM   Result Value Ref Range    Magnesium 1.8 (L) 1.9 - 2.7 mg/dL   Phosphorus    Collection Time: 02/09/24  6:53 AM   Result Value Ref Range    Phosphorus 3.6 2.3 - 4.1 mg/dL   APTT    Collection Time: 02/09/24  6:53 AM   Result Value Ref Range    PTT 71 (H) 23 - 37 seconds     Procedure: VAS carotid complete study    Result Date: 2/8/2024  Narrative:  [facility] REPORT CLINICAL: Indications: Patient presents to evaluate for carotid artery stenosis s/p recent TIA/CVA. Clinical Right Pressure:  140/90 mm Hg, Left Pressure:  140/90 mm Hg.  FINDINGS:  Right        PSV  EDV (cm/s)  Ratio  Mid. ICA      54          19   0.70  Prox. ICA     52          16   0.67  Dist CCA      69          16         Mid CCA       77          16   0.82  Prox CCA      95          13   0.91  Ext Carotid   60          12   0.78  Prox Vert     54          17         Subclavian   113          13         Innominate   104          14          Left         PSV  EDV (cm/s)  Ratio  Dist. ICA     80          18   0.97  Mid. ICA      97          24   1.18  Prox. ICA     77          22   0.94  Dist CCA      69          21         Mid CCA       82          22   0.85  Prox CCA      97          25         Ext Carotid   76          16   0.92  Prox Vert     48          13         Subclavian   106           0            CONCLUSION: Impression RIGHT: There is <50%  stenosis noted in the internal carotid artery. Plaque is heterogenous. Vertebral artery flow is antegrade. There is no significant subclavian artery disease. LEFT: There is <50% stenosis noted in the internal carotid artery. Plaque is heterogenous. Vertebral artery flow is antegrade. There is no significant subclavian artery disease.  SIGNATURE: Electronically Signed by: SIDNEY MONROY MD, RPVI on 2024-02-08 07:40:23 PM    Procedure: Echo complete w/ contrast if indicated    Result Date: 2/8/2024  Narrative:   Left Ventricle: Left ventricular cavity size is normal. Wall thickness is moderately increased. The left ventricular ejection fraction is 55%. Systolic function is normal. Global longitudinal strain is reduced at -13.4%. Wall motion is normal. Diastolic function is mildly abnormal, consistent with grade I (abnormal) relaxation.   Left Atrium: The atrium is moderately dilated.   Tricuspid Valve: There is mild regurgitation.   Compared to prior echocardiogram dated 4/5/2023, left ventricular systolic function has now significantly improved. Strain was performed to quantify interventricular dyssynchrony and evaluate components of myocardial function due to (positive family history of HCM, family history of sudden death, HCM, Chemotherapy, complex CHD, genetic abnormality, viral infection) . Results from the utilization of Strain Analysis are listed in the report below.     Procedure: MRI brain wo contrast    Result Date: 2/7/2024  Narrative: MRI BRAIN WITHOUT CONTRAST INDICATION: R09.89: Other specified symptoms and signs involving the circulatory and respiratory systems. COMPARISON:   None. TECHNIQUE:  Multiplanar, multisequence imaging of the brain was performed. IMAGE QUALITY:  Diagnostic. FINDINGS: BRAIN PARENCHYMA: There are acute or subacute cortical infarcts in the right precentral gyrus (series 3 image 23) and right parietal lobe (series 3 image 21). There is mild edema. No hemorrhagic transformation.  There is no discrete mass, mass effect or midline shift. There is no intracranial hemorrhage. Chronic lacunar infarcts involving left basal ganglia, genu of the left internal capsule, and left corona radiata. Chronic right basal ganglia lacunar infarct. Nonspecific  foci of T2/FLAIR hyperintensities involving periventricular and subcortical white matter, most compatible with mild microangiopathic change. VENTRICLES:  Normal for the patient's age. SELLA AND PITUITARY GLAND:  Normal. ORBITS:  Normal. PARANASAL SINUSES:  Normal. VASCULATURE:  Evaluation of the major intracranial vasculature demonstrates appropriate flow voids. CALVARIUM AND SKULL BASE:  Normal. EXTRACRANIAL SOFT TISSUES:  Normal.     Impression: 1.  Acute or subacute cortical infarcts in the right precentral gyrus and right parietal lobe. No significant edema, mass effect, or hemorrhagic transformation. 2.  Chronic left gangliocapsular and corona radiata lacunar infarcts. Chronic right basal ganglia lacunar infarcts. Mild microangiopathic change. Workstation performed: [id number]       Counseling / Coordination of Care  Reviewed case with neurology attending, history and physical examination, labs and imaging completed, plan of care as per attending physician.    Please see attestation for further details.

## 2024-02-09 NOTE — PROGRESS NOTES
Cardiology Progress Note - Ellie DRUMMOND Stoner 63 y.o. female MRN: [id number]    Unit/Bed#: E4 -01 Encounter: [id number]      Assessment & Plan:    CVA (cerebral vascular accident) ([facility])  - Brain MRI 2/7/2024 showed an acute or subacute cortical infarcts in the right precentral gyrus and right parietal lobe, no significant edema, mass effect or hemorrhage, chronic left ganglial capsular and corona radiata particular infarcts, chronic right basal ganglia lacunar infarcts  - Carotid ultrasound 2/8/2024 showed less than 50% bilateral internal carotid artery stenosis  - Pending head and neck MRA    Chronic heart failure with recovered ejection fraction  - Nonischemic cardiomyopathy  - TTE 6/3/2022 showed EF 28%, grade 3 DD, mildly dilated RV, severe LA, dilated RA, positive PFO, mild MR, moderate TR, estimated PA pressure 56 mmHg  - LHC 6/6/2022 showed normal epicardial coronary arteries  -TTE 11/28/2022 showed EF 44%  - TTE 4/5/2023 showed EF 28% with bull's-eye pattern on strain imaging  - TTE 2/8/2024 showed EF 55%, grade 1 DD, mild LAE, mild TR  - Left ventricular systolic function appears recovered on recent echo    Hypertension  - Continue with Toprol-XL 50 mg daily, spironolactone 25 mg daily    Hyperlipidemia  - Continue atorvastatin 40 mg daily  - Also on aspirin 81 mg daily    Mediastinal lymphadenopathy  - Plan for CT chest abdomen pelvis to evaluate for malignancy    Alcohol ingestion, 1-4 drinks per day    Other emphysema ([facility])    CKD (chronic kidney disease) stage 3, GFR 30-59 ml/min ([facility])    Patent foramen ovale    Summary:  - NATHAN today showed a very small patent youssef ovale, so unlikely to be the cause of her CVA, and no atheroma or other cause for her CVA was identified on the NATHAN  - TTE yesterday showed recovery of LV function, so CVA is not from reduced LV function  - Pending head and neck MRA  - Pending CT chest abdomen pelvis  - Given the recovery of LV function, no need for NOAC  - If no other  "cause for her CVA is identified this hospitalization, will arrange for a 2-week Zio patch after discharge with possible loop recorder in the future to look for atrial fibrillation/flutter  - Continue to monitor on telemetry while inpatient    Subjective:   No significant events overnight.  She reports feeling well this morning and has no complaints.  Denies chest pain, shortness of breath, orthopnea, abdominal pain, nausea, vomiting, fever, chills, headache, dizziness or palpitations.    Objective:     Vitals: Blood pressure 134/81, pulse 66, temperature 98.3 °F (36.8 °C), temperature source Temporal, resp. rate 18, height 5' 2\" (1.575 m), weight 63.2 kg (139 lb 5.3 oz), SpO2 96%, currently breastfeeding., Body mass index is 25.48 kg/m².,   Orthostatic Blood Pressures      Flowsheet Row Most Recent Value   Blood Pressure 134/81 filed at 02/09/2024 1540   Patient Position - Orthostatic VS Sitting filed at 02/09/2024 1453              Intake/Output Summary (Last 24 hours) at 2/9/2024 1606  Last data filed at 2/9/2024 1248  Gross per 24 hour   Intake 400 ml   Output 0 ml   Net 400 ml           Physical Exam:    GEN: Ellie DRUMMOND Stoner appears well, alert and oriented x 3, pleasant and cooperative   HEENT: Mucous membranes moist, no scleral icterus, no conjunctival pallor  NECK: No elevated JVD  HEART: Regular rate and rhythm, normal S1 and S2, no murmurs or rubs   LUNGS: clear to auscultation bilaterally; no wheezes, rales, or rhonchi   ABDOMEN: normal bowel sounds, soft, no tenderness, no distention  EXTREMITIES: peripheral pulses normal; no lower extremity edema   NEURO: 4-5 strength in left upper extremity, otherwise no other significant neurological deficits  SKIN: No lesions or rashes on exposed skin        Current Facility-Administered Medications:     acetaminophen (TYLENOL) tablet 650 mg, 650 mg, Oral, Q4H PRN, Rojas Brenner PA-C    albuterol (PROVENTIL HFA,VENTOLIN HFA) inhaler 2 puff, 2 puff, Inhalation, Q4H " "PRN, Lillian Cruz PA-C    aluminum-magnesium hydroxide-simethicone (MAALOX) oral suspension 30 mL, 30 mL, Oral, Q6H PRN, Rojas Brenner PA-C    aspirin chewable tablet 81 mg, 81 mg, Oral, Daily, Regina Weston MD, 81 mg at 02/09/24 0819    atorvastatin (LIPITOR) tablet 40 mg, 40 mg, Oral, QPM, Rojas Brenner PA-C, 40 mg at 02/08/24 1720    folic acid (FOLVITE) tablet 1 mg, 1 mg, Oral, Daily, Lillian Cruz PA-C, 1 mg at 02/09/24 1039    heparin (porcine) 25,000 units in 0.45% NaCl 250 mL infusion (premix), 3-24 Units/kg/hr (Order-Specific), Intravenous, Titrated, Rojas Brenner PA-C, Last Rate: 7.2 mL/hr at 02/09/24 1214, 12 Units/kg/hr at 02/09/24 1214    hydrALAZINE (APRESOLINE) injection 5 mg, 5 mg, Intravenous, Q6H PRN, Rojas Brenner PA-C, 5 mg at 02/09/24 1502    LORazepam (ATIVAN) injection 1 mg, 1 mg, Intravenous, Once PRN, Lillian Cruz PA-C    [START ON 2/10/2024] losartan (COZAAR) tablet 25 mg, 25 mg, Oral, Daily, Lillian Cruz PA-C    melatonin tablet 6 mg, 6 mg, Oral, HS, Mackenzie Smith PA-C, 6 mg at 02/08/24 2225    metoprolol succinate (TOPROL-XL) 24 hr tablet 50 mg, 50 mg, Oral, Daily, Rojas Brenner PA-C, 50 mg at 02/09/24 0819    multivitamin-minerals (CENTRUM) tablet 1 tablet, 1 tablet, Oral, Daily, Lillian Cruz PA-C, 1 tablet at 02/09/24 1039    ondansetron (ZOFRAN) injection 4 mg, 4 mg, Intravenous, Q6H PRN, Rojas Brenner PA-C    polyethylene glycol (MIRALAX) packet 17 g, 17 g, Oral, Daily PRN, Rojas Brenner PA-C    sodium chloride 0.9 % infusion, 50 mL/hr, Intravenous, Continuous, Lillian Cruz PA-C, Last Rate: 50 mL/hr at 02/09/24 1531, 50 mL/hr at 02/09/24 1531    spironolactone (ALDACTONE) tablet 25 mg, 25 mg, Oral, Daily, Rojas Brenner PA-C, 25 mg at 02/09/24 0819    thiamine tablet 100 mg, 100 mg, Oral, Daily, Lillian Cruz PA-C, 100 mg at 02/09/24 1039    Labs & Results:    No results found for: \"CKTOTAL\", \"CKMB\", \"CKMBINDEX\", \"TROPONINI\"    Lab Results   Component Value " "Date    CALCIUM 8.7 02/09/2024    K 3.8 02/09/2024    CO2 23 02/09/2024     (H) 02/09/2024    BUN 24 02/09/2024    CREATININE 1.31 (H) 02/09/2024       Lab Results   Component Value Date    WBC 10.02 02/09/2024    HGB 14.6 02/09/2024    HCT 42.6 02/09/2024     (H) 02/09/2024     (H) 02/09/2024     Results from last 7 days   Lab Units 02/08/24  0009   INR  1.09       No results found for: \"CHOL\"  Lab Results   Component Value Date    HDL 51 02/08/2024    HDL 55 06/02/2022     Lab Results   Component Value Date    LDLCALC 96 02/08/2024    LDLCALC 85 06/02/2022     Lab Results   Component Value Date    TRIG 61 02/08/2024    TRIG 78 06/02/2022       Lab Results   Component Value Date    ALT 35 02/08/2024    AST 43 (H) 02/08/2024    ALKPHOS 136 (H) 02/08/2024         EKG personally reviewed by )Brad Montano MD. No acute changes   TELE: No significant arrhythmias seen on telemetry review.           "

## 2024-02-09 NOTE — PHYSICAL THERAPY NOTE
PHYSICAL THERAPY EVALUATION          Patient Name: Ellie Lazo  Today's Date: 2/9/2024 02/09/24 0911   PT Last Visit   PT Visit Date 02/09/24   Note Type   Note type Evaluation   Pain Assessment   Pain Assessment Tool 0-10   Pain Score No Pain   Restrictions/Precautions   Other Precautions Multiple lines;Telemetry;Fall Risk   Home Living   Type of Home House   Home Layout Two level;1/2 bath on main level;Bed/bath upstairs;Stairs to enter without rails   Bathroom Shower/Tub Walk-in shower   Additional Comments 3 GEORGE. no DME   Prior Function   Level of [place] Independent with ADLs;Independent with functional mobility   Lives With Spouse   Receives Help From Family   IADLs Family/Friend/Other provides medication management  (dtr sets up patients medications)   Vocational Retired   Comments indep without an AD   General   Additional Pertinent History pt admitted 2/7/24 for CVA. up and oob orders. PMHx significant for ETOH use, CHF< CVA, cardiomyopathy, LLD   Cognition   Overall Cognitive Status WFL   Arousal/Participation Cooperative   Orientation Level Oriented X4   Memory Within functional limits   Following Commands Follows all commands and directions without difficulty   RLE Assessment   RLE Assessment WFL  (4+)   LLE Assessment   LLE Assessment WFL  (4+)   Coordination   Sensation WFL   Bed Mobility   Supine to Sit 6  Modified independent   Additional items HOB elevated   Transfers   Sit to Stand 7  Independent   Stand to Sit 7  Independent   Ambulation/Elevation   Gait pattern WNL;Improper Weight shift  (improper wt shift secondary to chronic LLD)   Gait Assistance 7  Independent   Additional items Assist x 1   Assistive Device None   Distance >200'   Balance   Static Standing Fair +   Dynamic Standing Fair   Ambulatory Fair   Endurance Deficit   Endurance Deficit No   Activity Tolerance   Activity Tolerance Patient tolerated treatment well   Medical Staff Made Aware OT   Nurse  Made Aware yes   Assessment   Prognosis Good   Assessment Ellie Lazo is a 63 y.o. female admitted to [facility] on 2/7/2024 for CVA (cerebral vascular accident) (HCC). was consulted and pt was seen on 2/9/2024 for mobility assessment and d/c planning. Pt presents w low fall risk, multiple lines. At baseline is indep without an AD. Pt is currently functioning at an indep level for bed mobility, transfers and ambulation. Pt demonstrated ability to ambulate community distances w no adverse sx reported. Mild gait deviations secondary to baseline LLD but no gross LOB observed. Pt denies any acute deficits impacting function. Appears to be functioning at baseline and does not demonstrate need for continued IPPT services. The patient's AM-PAC Basic Mobility Inpatient Short Form Raw Score is 24. A Raw score of greater than 16 suggests the patient may benefit from discharge to home.   Barriers to Discharge None   Plan   PT Frequency   (d/c PT)   Discharge Recommendation   Rehab Resource Intensity Level, PT No post-acute rehabilitation needs   AM-PAC Basic Mobility Inpatient   Turning in Flat Bed Without Bedrails 4   Lying on Back to Sitting on Edge of Flat Bed Without Bedrails 4   Moving Bed to Chair 4   Standing Up From Chair Using Arms 4   Walk in Room 4   Climb 3-5 Stairs With Railing 4   Basic Mobility Inpatient Raw Score 24   Basic Mobility Standardized Score 57.68   Highest Level Of Mobility   JH-HLM Goal 8: Walk 250 feet or more   JH-HLM Achieved 8: Walk 250 feet ot more   End of Consult   Patient Position at End of Consult Seated edge of bed;All needs within reach     History: co - morbidities including age, current experience including fall risk, multiple lines  Exam: impairments in systems including multiple body structures involved; musculoskeletal (strength), neuromuscular (balance, gait, transfers, sensation)  Clinical: stable/unpredictable  Complexity: moderate    Lula Rodrigez, PT

## 2024-02-09 NOTE — ASSESSMENT & PLAN NOTE
History of tobacco/marijuana use. Reports quitting tobacco. No current exacerbation  PRN albuterol

## 2024-02-09 NOTE — PLAN OF CARE
Problem: OCCUPATIONAL THERAPY ADULT  Goal: Performs self-care activities at highest level of function for planned discharge setting.  See evaluation for individualized goals.  Description:            See flowsheet documentation for full assessment, interventions and recommendations.   Outcome: Adequate for Discharge  Note:    Prognosis: Good  Assessment: Ellie Lazo is a 63 y.o. female admitted to [facility] on 2/7/2024 for CVA (cerebral vascular accident) (HCC). was consulted and pt was seen on 2/9/2024 for ADL assessment and d/c planning. Pt presents w low fall risk, multiple lines. At baseline is indep without an AD. Pt is currently functioning at an indep level for bed mobility, transfers. functional mobility, dressing and toileting tasks.  Pt denies any acute deficits impacting function. Appears to be functioning at baseline and does not demonstrate need for continued IPPT services. The patient's AM-PAC ADL Raw Score is 24. A Raw score of greater than 16 suggests the patient may benefit from discharge to home.     Rehab Resource Intensity Level, OT: No post-acute rehabilitation needs

## 2024-02-10 ENCOUNTER — APPOINTMENT (INPATIENT)
Dept: MRI IMAGING | Facility: HOSPITAL | Age: 64
DRG: 045 | End: 2024-02-10
Payer: MEDICARE

## 2024-02-10 ENCOUNTER — APPOINTMENT (INPATIENT)
Dept: CT IMAGING | Facility: HOSPITAL | Age: 64
DRG: 045 | End: 2024-02-10
Payer: MEDICARE

## 2024-02-10 VITALS
HEART RATE: 85 BPM | WEIGHT: 139.33 LBS | SYSTOLIC BLOOD PRESSURE: 148 MMHG | DIASTOLIC BLOOD PRESSURE: 103 MMHG | OXYGEN SATURATION: 98 % | RESPIRATION RATE: 18 BRPM | HEIGHT: 62 IN | TEMPERATURE: 97.5 F | BODY MASS INDEX: 25.64 KG/M2

## 2024-02-10 PROBLEM — I42.8 NON-ISCHEMIC CARDIOMYOPATHY (HCC): Status: RESOLVED | Noted: 2022-06-03 | Resolved: 2024-02-10

## 2024-02-10 LAB
ANION GAP SERPL CALCULATED.3IONS-SCNC: 7 MMOL/L
APTT PPP: 47 SECONDS (ref 23–37)
APTT PPP: 48 SECONDS (ref 23–37)
APTT PPP: 52 SECONDS (ref 23–37)
BUN SERPL-MCNC: 21 MG/DL (ref 5–25)
CALCIUM SERPL-MCNC: 9 MG/DL (ref 8.4–10.2)
CHLORIDE SERPL-SCNC: 110 MMOL/L (ref 96–108)
CO2 SERPL-SCNC: 20 MMOL/L (ref 21–32)
CREAT SERPL-MCNC: 1.26 MG/DL (ref 0.6–1.3)
ERYTHROCYTE [DISTWIDTH] IN BLOOD BY AUTOMATED COUNT: 15 % (ref 11.6–15.1)
GFR SERPL CREATININE-BSD FRML MDRD: 45 ML/MIN/1.73SQ M
GLUCOSE SERPL-MCNC: 90 MG/DL (ref 65–140)
HCT VFR BLD AUTO: 42.5 % (ref 34.8–46.1)
HGB BLD-MCNC: 14.3 G/DL (ref 11.5–15.4)
MCH RBC QN AUTO: 34 PG (ref 26.8–34.3)
MCHC RBC AUTO-ENTMCNC: 33.6 G/DL (ref 31.4–37.4)
MCV RBC AUTO: 101 FL (ref 82–98)
PLATELET # BLD AUTO: 719 THOUSANDS/UL (ref 149–390)
PMV BLD AUTO: 10.3 FL (ref 8.9–12.7)
POTASSIUM SERPL-SCNC: 5 MMOL/L (ref 3.5–5.3)
RBC # BLD AUTO: 4.2 MILLION/UL (ref 3.81–5.12)
SODIUM SERPL-SCNC: 137 MMOL/L (ref 135–147)
WBC # BLD AUTO: 14.17 THOUSAND/UL (ref 4.31–10.16)

## 2024-02-10 PROCEDURE — 99239 HOSP IP/OBS DSCHRG MGMT >30: CPT | Performed by: PHYSICIAN ASSISTANT

## 2024-02-10 PROCEDURE — 85730 THROMBOPLASTIN TIME PARTIAL: CPT | Performed by: PSYCHIATRY & NEUROLOGY

## 2024-02-10 PROCEDURE — 70547 MR ANGIOGRAPHY NECK W/O DYE: CPT

## 2024-02-10 PROCEDURE — 74177 CT ABD & PELVIS W/CONTRAST: CPT

## 2024-02-10 PROCEDURE — RECHECK: Performed by: PHYSICIAN ASSISTANT

## 2024-02-10 PROCEDURE — 70544 MR ANGIOGRAPHY HEAD W/O DYE: CPT

## 2024-02-10 PROCEDURE — 85027 COMPLETE CBC AUTOMATED: CPT | Performed by: INTERNAL MEDICINE

## 2024-02-10 PROCEDURE — G1004 CDSM NDSC: HCPCS

## 2024-02-10 PROCEDURE — 80048 BASIC METABOLIC PNL TOTAL CA: CPT | Performed by: INTERNAL MEDICINE

## 2024-02-10 PROCEDURE — 85730 THROMBOPLASTIN TIME PARTIAL: CPT | Performed by: INTERNAL MEDICINE

## 2024-02-10 PROCEDURE — 71260 CT THORAX DX C+: CPT

## 2024-02-10 RX ORDER — ALBUTEROL SULFATE 90 UG/1
2 AEROSOL, METERED RESPIRATORY (INHALATION) EVERY 4 HOURS PRN
Qty: 18 G | Refills: 0 | Status: SHIPPED | OUTPATIENT
Start: 2024-02-10

## 2024-02-10 RX ORDER — ASPIRIN 81 MG/1
81 TABLET, CHEWABLE ORAL DAILY
Qty: 30 TABLET | Refills: 0 | Status: SHIPPED | OUTPATIENT
Start: 2024-02-11 | End: 2024-03-12

## 2024-02-10 RX ORDER — CLOPIDOGREL BISULFATE 75 MG/1
75 TABLET ORAL DAILY
Qty: 20 TABLET | Refills: 0 | Status: SHIPPED | OUTPATIENT
Start: 2024-02-11 | End: 2024-03-02

## 2024-02-10 RX ORDER — CLOPIDOGREL BISULFATE 75 MG/1
75 TABLET ORAL DAILY
Status: DISCONTINUED | OUTPATIENT
Start: 2024-02-10 | End: 2024-02-10 | Stop reason: HOSPADM

## 2024-02-10 RX ORDER — LOSARTAN POTASSIUM 25 MG/1
25 TABLET ORAL DAILY
Qty: 30 TABLET | Refills: 0 | Status: SHIPPED | OUTPATIENT
Start: 2024-02-10

## 2024-02-10 RX ORDER — ATORVASTATIN CALCIUM 40 MG/1
40 TABLET, FILM COATED ORAL EVERY EVENING
Qty: 30 TABLET | Refills: 0 | Status: SHIPPED | OUTPATIENT
Start: 2024-02-10

## 2024-02-10 RX ADMIN — SPIRONOLACTONE 25 MG: 25 TABLET, FILM COATED ORAL at 08:03

## 2024-02-10 RX ADMIN — CLOPIDOGREL BISULFATE 75 MG: 75 TABLET ORAL at 15:39

## 2024-02-10 RX ADMIN — FOLIC ACID 1 MG: 1 TABLET ORAL at 08:03

## 2024-02-10 RX ADMIN — HEPARIN SODIUM 17 UNITS/KG/HR: 10000 INJECTION, SOLUTION INTRAVENOUS at 08:04

## 2024-02-10 RX ADMIN — THIAMINE HCL TAB 100 MG 100 MG: 100 TAB at 08:03

## 2024-02-10 RX ADMIN — IOHEXOL 100 ML: 350 INJECTION, SOLUTION INTRAVENOUS at 09:43

## 2024-02-10 RX ADMIN — LOSARTAN POTASSIUM 25 MG: 25 TABLET, FILM COATED ORAL at 08:03

## 2024-02-10 RX ADMIN — METOPROLOL SUCCINATE 50 MG: 50 TABLET, EXTENDED RELEASE ORAL at 08:03

## 2024-02-10 RX ADMIN — LORAZEPAM 1 MG: 2 INJECTION INTRAMUSCULAR; INTRAVENOUS at 13:03

## 2024-02-10 RX ADMIN — Medication 1 TABLET: at 08:03

## 2024-02-10 RX ADMIN — ASPIRIN 81 MG CHEWABLE TABLET 81 MG: 81 TABLET CHEWABLE at 08:03

## 2024-02-10 NOTE — ASSESSMENT & PLAN NOTE
Presents for evaluation of CVA. Seen by cardiology group today in routine visit, she noted to them that 10 days ago she had an episode of confusion, difficulty speaking, left hand weakness all of which spontaneously resolved. A stat MRI was completed.   MRI 02/07/2024 reviewed: acute/subacute cortical infarcts in R precentral gyrus and parietal lobe, no significant edema, mass effect, hemorrhagic transformation. Chronic left ganglioscapular and corona radiata lacunar infarcts, chronic R basal ganglia lacunar infarct, mild microangiopathic changes  carotid duplex: Less than 50% stenosis left and right internal carotid arteries  Etiology suspected to be cardioembolic  Recovered EF and PFO very small not the cause of stroke   MRA head/neck noting moderate right paraophthalmic ICA stenosis, no large vessel occlusion, unremarkable MR angiogram of the cervical vasculature  CT chest abdomen pelvis: No acute findings in the chest abdomen pelvis, previously reported me ascending adenopathy has resolved with scattered residual subcentimeter lymph nodes  Echo: EF 55%, grade 1 diastolic dysfunction, mild TR  Discussed with raj John to be discharged today on dual antiplatelet therapy and no need for DOAC  Will continue aspirin 81 mg daily and Plavix 75 mg daily for 21 days and then aspirin monotherapy thereafter  Continue statin with atorvastatin 40 mg daily  Outpatient follow-up with neurology in the stroke clinic as well as cardiology for cardiac MRI and Zio patch     Will need outpt zio patch and thrombosis panel with ongoing hematology follow up   Recommend BMP in 1 week

## 2024-02-10 NOTE — ASSESSMENT & PLAN NOTE
Wt Readings from Last 3 Encounters:   02/08/24 63.2 kg (139 lb 5.3 oz)   02/07/24 61.2 kg (135 lb)   06/05/23 60.3 kg (133 lb)     Likely nonischemic, chronic daily alcohol use, questionable amyloid. Follows with [facility] heart failure group. Appears compensated   TTE 04/2023 reviewed: LVEF 28% severely reduced, global longitudinal strain with an apical sparing bulls-eye pattern, severe global hypokinesis, diastolic dysfunction severely abnormal, severe biatrial dilation, patent foramen ovale with left to R shunting, mild to moderate MR, mild to moderate TR, PASP 49mmHg   Repeat Echo with improved EF 55%   NATHAN 2/9 very small PFO, no thrombus  Continue Toprol-XL 50 mg daily, losartan 25 mg daily, Aldactone 25 mg daily, aspirin 81 mg daily, Lipitor 40 mg daily  Did not tolerate Jardiance, Entresto stopped due to lightheadedness, takes Lasix 20 mg as needed  Outpatient cardiac MRI  Potassium hemolyzed on morning labs

## 2024-02-10 NOTE — ASSESSMENT & PLAN NOTE
Patient had CTA chest in April 2023 with stable mild right paratracheal lymphadenopathy and enlarged subcarinal node since April 2023. Stability over one year suggests a benign etiology.   Was seen by hematology outpt recommending CT chest abdo pelvis   Completed CT chest abdomen pelvis here with no acute findings, previously reported mediastinal adenopathy has resolved with scattered residual subsegmental her lymph nodes  Continue outpatient workup with hematology outpatient  BMP and CBC in 1 week

## 2024-02-10 NOTE — ASSESSMENT & PLAN NOTE
Presents for evaluation of CVA. Seen by cardiology group today in routine visit, she noted to them that 10 days ago she had an episode of confusion, difficulty speaking, left hand weakness all of which spontaneously resolved. A stat MRI was completed.   MRI 02/07/2024 reviewed: acute/subacute cortical infarcts in R precentral gyrus and parietal lobe, no significant edema, mass effect, hemorrhagic transformation. Chronic left ganglioscapular and corona radiata lacunar infarcts, chronic R basal ganglia lacunar infarct, mild microangiopathic changes  carotid duplex: Less than 50% stenosis left and right internal carotid arteries  Etiology suspected to be cardioembolic  Recovered EF and PFO very small not the cause of stroke   MRI head and carotids pending  Echo: EF 55%, grade 1 diastolic dysfunction, mild TR  Is still on heparin gtt at this time, awaiting further eval with CT C/A/P and MRA's head/neck   Continue heparin drip at this time until pending tests result   Continue  aspirin 81 mg daily and atorvastatin 40 mg daily   May be able to go on DAPT and not DOAC if pending imaging negative   Will need outpt zio patch and thrombosis panel with ongoing hematology follow up   Follow up pending iimaging

## 2024-02-10 NOTE — UTILIZATION REVIEW
Notification of Unplanned, Urgent, or   Emergency Inpatient Admission   AUTHORIZATION REQUEST   Admitting Facility [place] [address]  Tax ID: [id number]  NPI: [id number]  Place of Service: Acute [facility]  Admission Level of Care: Inpatient  Place of Service Code: 21     Attending Physician Information  Attending Name and NPI#: Johny Badillo Md [[id number]]  Phone: [phone]     Admission Information  Inpatient Admission Date/Time: 2/9/24  2:48 PM  Discharge Date/Time: No discharge date for patient encounter.  Admitting Diagnosis Code/Description:  Hypertension [I10]  PFO (patent foramen ovale) [Q21.12]  CVA (cerebral vascular accident) (HCC) [I63.9]     Utilization Review Contact  Ghada Alaniz Utilization   Phone: [phone]  Fax: [phone]  Email: [email or web address]  Contact for approvals/pending authorizations, clinical reviews, and discharge.     Physician Advisory Services Contact  Medical Necessity Denial & Glbm-ba-[facility]oh[facility] Discussion  Phone: [phone]  Fax: [phone]  Email: [email or web address]     DISCHARGE SUPPORT TEAM:  For Patients Discharge Needs & Updates  Phone: [phone] opt. 2 Fax: [phone]  Email: [email or web address]

## 2024-02-10 NOTE — ASSESSMENT & PLAN NOTE
Patient had CTA chest in April 2023 with stable mild right paratracheal lymphadenopathy and enlarged subcarinal node since April 2023. Stability over one year suggests a benign etiology.   Was seen by hematology outpt recommending CT chest abdo pelvis   Pending imaging to rule out  occult malignancy for cause of stroke

## 2024-02-10 NOTE — DISCHARGE SUMMARY
Critical access hospital  Discharge- Ellie DRUMMOND Stoner 1960, 63 y.o. female MRN: [id number]  Unit/Bed#: E4 -01 Encounter: [id number]  Primary Care Provider: No primary care provider on file.   Date and time admitted to hospital: 2/7/2024 10:08 PM    * CVA (cerebral vascular accident) (HCC)  Assessment & Plan  Presents for evaluation of CVA. Seen by cardiology group today in routine visit, she noted to them that 10 days ago she had an episode of confusion, difficulty speaking, left hand weakness all of which spontaneously resolved. A stat MRI was completed.   MRI 02/07/2024 reviewed: acute/subacute cortical infarcts in R precentral gyrus and parietal lobe, no significant edema, mass effect, hemorrhagic transformation. Chronic left ganglioscapular and corona radiata lacunar infarcts, chronic R basal ganglia lacunar infarct, mild microangiopathic changes  carotid duplex: Less than 50% stenosis left and right internal carotid arteries  Etiology suspected to be cardioembolic  Recovered EF and PFO very small not the cause of stroke   MRA head/neck noting moderate right paraophthalmic ICA stenosis, no large vessel occlusion, unremarkable MR angiogram of the cervical vasculature  CT chest abdomen pelvis: No acute findings in the chest abdomen pelvis, previously reported me ascending adenopathy has resolved with scattered residual subcentimeter lymph nodes  Echo: EF 55%, grade 1 diastolic dysfunction, mild TR  Discussed with raj John to be discharged today on dual antiplatelet therapy and no need for DOAC  Will continue aspirin 81 mg daily and Plavix 75 mg daily for 21 days and then aspirin monotherapy thereafter  Continue statin with atorvastatin 40 mg daily  Outpatient follow-up with neurology in the stroke clinic as well as cardiology for cardiac MRI and Zio patch     Will need outpt zio patch and thrombosis panel with ongoing hematology follow up   Recommend BMP in 1 week        Patent foramen  ovale  Assessment & Plan  Hx of PFO , per cardiology very small on NATHAN unlikely cause of stroke     CKD (chronic kidney disease) stage 3, GFR 30-59 ml/min ([facility])  Assessment & Plan  Lab Results   Component Value Date    EGFR 45 02/10/2024    EGFR 43 02/09/2024    EGFR 43 02/08/2024    CREATININE 1.26 02/10/2024    CREATININE 1.31 (H) 02/09/2024    CREATININE 1.30 02/08/2024   Since 2022 Cr around 1.3-1.4   Within baseline currently     Chronic systolic heart failure ([facility])  Assessment & Plan  Wt Readings from Last 3 Encounters:   02/08/24 63.2 kg (139 lb 5.3 oz)   02/07/24 61.2 kg (135 lb)   06/05/23 60.3 kg (133 lb)     Likely nonischemic, chronic daily alcohol use, questionable amyloid. Follows with [facility] heart failure group. Appears compensated   TTE 04/2023 reviewed: LVEF 28% severely reduced, global longitudinal strain with an apical sparing bulls-eye pattern, severe global hypokinesis, diastolic dysfunction severely abnormal, severe biatrial dilation, patent foramen ovale with left to R shunting, mild to moderate MR, mild to moderate TR, PASP 49mmHg   Repeat Echo with improved EF 55%   NATHAN 2/9 very small PFO, no thrombus  Continue Toprol-XL 50 mg daily, losartan 25 mg daily, Aldactone 25 mg daily, aspirin 81 mg daily, Lipitor 40 mg daily  Did not tolerate Jardiance, Entresto stopped due to lightheadedness, takes Lasix 20 mg as needed  Outpatient cardiac MRI and Zio patch   Potassium hemolyzed on morning labs   BMP in 1 week       Other emphysema ([facility])  Assessment & Plan  History of tobacco/marijuana use. Reports quitting tobacco. No current exacerbation  PRN albuterol    Alcohol ingestion, 1-4 drinks per day  Assessment & Plan  Reports daily alcohol ingestion  No withdrawal symptoms   Educated to stop drinking     Mediastinal lymphadenopathy  Assessment & Plan  Patient had CTA chest in April 2023 with stable mild right paratracheal lymphadenopathy and enlarged subcarinal node since April 2023. Stability over one  year suggests a benign etiology.   Was seen by hematology outpt recommending CT chest abdo pelvis   Completed CT chest abdomen pelvis here with no acute findings, previously reported mediastinal adenopathy has resolved with scattered residual subsegmental her lymph nodes  Continue outpatient workup with hematology outpatient  BMP and CBC in 1 week    Non-ischemic cardiomyopathy (HCC)-resolved as of 2/10/2024  Assessment & Plan  EF on echo 55% ,. Not the cause of stroke      Medical Problems       Resolved Problems  Date Reviewed: 2/9/2024            Resolved    Non-ischemic cardiomyopathy (HCC) 2/10/2024     Resolved by  Lillian Cruz PA-C        Discharging Physician / Practitioner: Lillian Cruz PA-C  PCP: No primary care provider on file.  Admission Date:   Admission Orders (From admission, onward)       Ordered        02/09/24 1448  Inpatient Admission  Once            02/07/24 2306  Place in Observation  Once                          Discharge Date: 02/10/24    Consultations During Hospital Stay:  Neurology   Cardiology     Procedures Performed:   TTE:   Interpretation Summary         Left Ventricle: Left ventricular cavity size is normal. Wall thickness is moderately increased. The left ventricular ejection fraction is 55%. Systolic function is normal. Global longitudinal strain is reduced at -13.4%. Wall motion is normal. Diastolic function is mildly abnormal, consistent with grade I (abnormal) relaxation.    Left Atrium: The atrium is moderately dilated.    Tricuspid Valve: There is mild regurgitation.    Compared to prior echocardiogram dated 4/5/2023, left ventricular systolic function has now significantly improved.     Strain was performed to quantify interventricular dyssynchrony and evaluate components of myocardial function due to (positive family history of HCM, family history of sudden death, HCM, Chemotherapy, complex CHD, genetic abnormality, viral infection) . Results from the utilization  of Strain Analysis are listed in the report below.     Findings    Left Ventricle Left ventricular cavity size is normal. Wall thickness is moderately increased. The left ventricular ejection fraction is 55%. Systolic function is normal. Global longitudinal strain is reduced at -13.4%. Wall motion is normal. Diastolic function is mildly abnormal, consistent with grade I (abnormal) relaxation.   Right Ventricle Right ventricular cavity size is normal. Systolic function is normal. Wall thickness is normal.   Left Atrium The atrium is moderately dilated.   Right Atrium The atrium is normal in size.   Aortic Valve The aortic valve is trileaflet. The leaflets are not thickened. The leaflets are not calcified. The leaflets exhibit normal mobility. There is no evidence of regurgitation. The aortic valve has no significant stenosis.   Mitral Valve There is no evidence of regurgitation. There is no evidence of stenosis. The mitral valve has normal structure and normal function.   Tricuspid Valve Tricuspid valve structure is normal. There is mild regurgitation. There is no evidence of stenosis.   Pulmonic Valve Pulmonic valve structure is normal. There is no evidence of regurgitation. There is no evidence of stenosis.   Ascending Aorta The aortic root is normal in size.   IVC/SVC The inferior vena cava is dilated. Respirophasic changes were normal.   Pericardium There is no pericardial effusion. The pericardium is normal in appearance.     NATHAN:     Interpretation Summary         Left Ventricle: Left ventricular cavity size is normal. Wall thickness is normal. The left ventricular ejection fraction is 55% by visual estimation. Systolic function is normal. Although no diagnostic regional wall motion abnormality was identified, this possibility cannot be completely excluded on the basis of this study.    Right Ventricle: Right ventricular cavity size is normal. Systolic function is normal.    Left Atrium: The atrium is  dilated.    Atrial Septum: There is a very small and patent foramen ovale confirmed at rest with predominant left to right shunting using color Doppler.    Left Atrial Appendage: There is normal function. There is no thrombus.     Findings    Left Ventricle Left ventricular cavity size is normal. Wall thickness is normal. The left ventricular ejection fraction is 55% by visual estimation. Systolic function is normal. Although no diagnostic regional wall motion abnormality was identified, this possibility cannot be completely excluded on the basis of this study. Unable to assess diastolic function.   Right Ventricle Right ventricular cavity size is normal. Systolic function is normal. Wall thickness is normal.   Left Atrium The atrium is dilated.   Right Atrium The atrium is normal in size.   Atrial Septum There is a very small and patent foramen ovale confirmed at rest with predominant left to right shunting using color Doppler.   Left Atrial Appendage Left atrial appendage is normal in size. There is normal function. There is no thrombus.   Aortic Valve The aortic valve is trileaflet. The leaflets are not thickened. The leaflets are not calcified. The leaflets exhibit normal mobility. There is no evidence of regurgitation. The aortic valve has no significant stenosis.   Mitral Valve Mitral valve structure is normal.  There is trace regurgitation. There is no evidence of stenosis.   Tricuspid Valve Tricuspid valve structure is normal. There is trace regurgitation. There is no evidence of stenosis.   Pulmonic Valve Pulmonic valve structure is normal. There is no evidence of regurgitation. There is no evidence of stenosis.   Ascending Aorta The aortic root is normal in size. There is no evidence of aortic dissection. There is no aneurysm in the aorta. There is no significant atheroma.     Significant Findings / Test Results:   MRA head   FINDINGS:     IMAGE QUALITY:  Diagnostic.     ANATOMY     INTERNAL CAROTID  ARTERIES: Decreased flow related enhancement identified ophthalmic segment of the right internal carotid artery compatible with mild stenosis.     ANTERIOR CIRCULATION: Fenestrated right A1 which is an anatomic variant..  Normal anterior communicating artery.  Normal flow-related signal of the anterior cerebral arteries.     MIDDLE CEREBRAL ARTERY CIRCULATION:  The M1 segment and middle cerebral artery branches demonstrate normal flow-related signal.     DISTAL VERTEBRAL ARTERIES:  Distal vertebral arteries are patent with a normal vertebrobasilar junction.  The posterior inferior cerebellar artery origins are normal.     BASILAR ARTERY:  Normal.     POSTERIOR CEREBRAL ARTERIES:  Both posterior cerebral arteries arises from the basilar tip.  Both arteries demonstrate normal flow-related enhancement.  Patent posterior communicating arteries.     IMPRESSION:     Moderate right paraophthalmic ICA stenosis. No large vessel occlusion.  MRA neck   FINDINGS:     AORTIC ARCH: Limited evaluation of the aortic arch.     VISUALIZED SUBCLAVIAN ARTERIES: Limited evaluation of the subclavian vessels with no gross stensosis.     VERTEBRAL ARTERIES:  Normal vertebral artery origins. The vertebral arteries are bilaterally symmetric with normal enhancement and no evidence of stenosis.     RIGHT CAROTID ARTERY:  Normal common carotid artery, cervical internal carotid artery and external carotid artery. No stenosis at the bifurcation.     LEFT CAROTID ARTERY:  Normal common carotid artery, cervical internal carotid artery and external carotid artery. No stenosis at the bifurcation.     VISUALIZED INTRACRANIAL VASCULATURE:  Limited visualization of the intracranial vasculature is grossly unremarkable.     NASCET criteria was used to determine the degree of internal carotid artery diameter stenosis.     IMPRESSION:     Unremarkable MR angiogram of the cervical vasculature.  MRI brain   CT chest abdomen pelvis   FINDINGS:     CHEST      LUNGS: No acute findings. No endotracheal or endobronchial lesion. No suspicious nodules.     PLEURA: Unremarkable.     HEART/GREAT VESSELS: Previously described moderate cardiomegaly appears improved now with borderline heart size. Coronary artery calcifications. Aortic annulus calcifications. No thoracic aortic aneurysm.     MEDIASTINUM AND JENNIFER: No residual mediastinal adenopathy. Scattered subcentimeter lymph nodes.     CHEST WALL AND LOWER NECK: Unremarkable.     ABDOMEN     LIVER/BILIARY TREE: Subcentimeter hypoattenuating lesion(s), too small to characterize but statistically likely benign, which do not require follow-up (ACR White Paper 2017). No suspicious mass. Normal hepatic contours. No biliary dilation.     GALLBLADDER: Cholelithiasis without findings of acute cholecystitis.     SPLEEN: Unremarkable.     PANCREAS: Unremarkable.     ADRENAL GLANDS: Unremarkable.     KIDNEYS/URETERS: Atrophic left kidney. No hydroureteronephrosis.     STOMACH AND BOWEL: Colonic diverticulosis without findings of acute diverticulitis.     APPENDIX: Noninflamed.     ABDOMINOPELVIC CAVITY: No ascites. No pneumoperitoneum. No lymphadenopathy.     VESSELS: Unremarkable for patient's age.     PELVIS     REPRODUCTIVE ORGANS: Unremarkable for patient's age.     URINARY BLADDER: Unremarkable.     ABDOMINAL WALL/INGUINAL REGIONS: Tiny uncomplicated fat-containing umbilical hernia.     BONES: No acute fracture or suspicious osseous lesion. Spinal degenerative changes. Partially imaged right hip prosthesis.     IMPRESSION:     No acute findings in the chest, abdomen or pelvis.     Previously reported mediastinal adenopathy has resolved with scattered residual subcentimeter lymph nodes.  Carotid duplex   FINDINGS:     Right        PSV  EDV (cm/s)  Ratio    Mid. ICA      54          19   0.70    Prox. ICA     52          16   0.67    Dist CCA      69          16           Mid CCA       77          16   0.82    Prox CCA      95           13   0.91    Ext Carotid   60          12   0.78    Prox Vert     54          17           Subclavian   113          13           Innominate   104          14              Left         PSV  EDV (cm/s)  Ratio    Dist. ICA     80          18   0.97    Mid. ICA      97          24   1.18    Prox. ICA     77          22   0.94    Dist CCA      69          21           Mid CCA       82          22   0.85    Prox CCA      97          25           Ext Carotid   76          16   0.92    Prox Vert     48          13           Subclavian   106           0                    CONCLUSION:  Impression  RIGHT:  There is <50% stenosis noted in the internal carotid artery. Plaque is  heterogenous.  Vertebral artery flow is antegrade. There is no significant subclavian artery  disease.  LEFT:  There is <50% stenosis noted in the internal carotid artery. Plaque is  heterogenous.  Vertebral artery flow is antegrade. There is no significant subclavian artery  disease.  MRI brain   FINDINGS:     BRAIN PARENCHYMA:     There are acute or subacute cortical infarcts in the right precentral gyrus (series 3 image 23) and right parietal lobe (series 3 image 21). There is mild edema. No hemorrhagic transformation.     There is no discrete mass, mass effect or midline shift. There is no intracranial hemorrhage. Chronic lacunar infarcts involving left basal ganglia, genu of the left internal capsule, and left corona radiata. Chronic right basal ganglia lacunar infarct.   Nonspecific  foci of T2/FLAIR hyperintensities involving periventricular and subcortical white matter, most compatible with mild microangiopathic change.        VENTRICLES:  Normal for the patient's age.     SELLA AND PITUITARY GLAND:  Normal.     ORBITS:  Normal.     PARANASAL SINUSES:  Normal.     VASCULATURE:  Evaluation of the major intracranial vasculature demonstrates appropriate flow voids.     CALVARIUM AND SKULL BASE:  Normal.     EXTRACRANIAL SOFT TISSUES:  Normal.      IMPRESSION:     1.  Acute or subacute cortical infarcts in the right precentral gyrus and right parietal lobe. No significant edema, mass effect, or hemorrhagic transformation.  2.  Chronic left gangliocapsular and corona radiata lacunar infarcts. Chronic right basal ganglia lacunar infarcts. Mild microangiopathic change.  Incidental Findings:   Ongoing workup of thrombocytosis outpatient with hematology  Test Results Pending at Discharge (will require follow up):   BMP and CBC with differential in 1 week  B12      Outpatient Tests Requested:  Neurology  cardiology for outpatient Zio patch  Ongoing follow-up with hematology  PCP in 1 week for posthospital follow-up and for repeat lab work in 1 week    Complications:      Reason for Admission: CVA    Hospital Course:   Ellie Lazo is a 63 y.o. female patient who originally presented to the hospital on 2/7/2024 due to CVA.  Patient was seen by cardiology in routine visit who ordered stat MRI as she noted 10 days ago to have episode of confusion, difficulty speaking and left hand weakness all of which spontaneously resolved.  MRI outpatient on 2/7 revealed acute/subacute cortical infarcts in the right precentral gyrus and parietal lobe, Chronic left ganglioscapular and corona radiata lacunar infarcts, chronic R basal ganglia lacunar infarct, mild microangiopathic changes.  Patient was seen in consultation with neurology.  She underwent carotid duplex less than 50% stenosis left and right internal carotid arteries.  The etiology of her stroke was suspected to be cardioembolic.  She does note to have recovered EF on echocardiogram and a very small PFO that are not the cause of her stroke.  She was seen in consultation with cardiology as well and underwent NATHAN as noted above.  MRA head and carotids were also completed.  Discussed extensively with Dr. Shah and cardiology.  Plan is to be discharged on dual antiplatelet thinner with aspirin 81 mg and Plavix 75 mg  "daily for 21 days and then continue aspirin monotherapy thereafter.  She will continue on atorvastatin 40 mg daily.  She will need follow-up with neurology in the stroke clinic.  She was educated in the importance of avoidance from tobacco, illicit drugs and alcohol.  She was educated on importance of medication compliance.  She will need outpatient follow-up with cardiology for Zio patch and cardiac MRI.  She needs ongoing follow-up with hematology outpatient for thrombosis panel and further evaluation of thrombocytosis.  She was okay to be discharged from neurology standpoint today.   I have increased her losartan to 25 mg daily and continue on Aldactone 25 mg daily and Toprol-XL 50 mg daily. She should have repeat BMP and CBC within 1 week.      Please see above list of diagnoses and related plan for additional information.     Condition at Discharge: stable    Discharge Day Visit / Exam:   Subjective: Doing well without acute complaints.  No chest pain shortness of breath new neurologic changes.  Patient was reevaluated this afternoon after completion of her CT chest abdomen pelvis MRIA head/neck.    Vitals: Blood Pressure: 148/103   Pulse: 85 (02/10/24 1510)  Temperature: 97.5 °F (36.4 °C) (02/10/24 1510)  Temp Source: Temporal (02/10/24 1510)  Respirations: 18 (02/10/24 1510)  Height: 5' 2\" (157.5 cm) (02/08/24 0900)  Weight - Scale: 63.2 kg (139 lb 5.3 oz) (02/08/24 0900)  SpO2: 98 % (02/10/24 1510)  Exam:   Physical Exam see physical exam from progress not today     Discussion with Family: Patient declined call to .     Discharge instructions/Information to patient and family:   See after visit summary for information provided to patient and family.      Provisions for Follow-Up Care:  See after visit summary for information related to follow-up care and any pertinent home health orders.      Mobility at time of Discharge:   Basic Mobility Inpatient Raw Score: 24  -HL Goal: 8: Walk 250 feet " or more  JH-HLM Achieved: 8: Walk 250 feet ot more  HLM Goal achieved. Continue to encourage appropriate mobility.     Disposition:   Home    Planned Readmission: none     Discharge Statement:  I spent 50 minutes discharging the patient. This time was spent on the day of discharge. I had direct contact with the patient on the day of discharge. Greater than 50% of the total time was spent examining patient, answering all patient questions, arranging and discussing plan of care with patient as well as directly providing post-discharge instructions.  Additional time then spent on discharge activities.    Discharge Medications:  See after visit summary for reconciled discharge medications provided to patient and/or family.      **Please Note: This note may have been constructed using a voice recognition system**

## 2024-02-10 NOTE — ASSESSMENT & PLAN NOTE
Wt Readings from Last 3 Encounters:   02/08/24 63.2 kg (139 lb 5.3 oz)   02/07/24 61.2 kg (135 lb)   06/05/23 60.3 kg (133 lb)     Likely nonischemic, chronic daily alcohol use, questionable amyloid. Follows with [facility] heart failure group. Appears compensated   TTE 04/2023 reviewed: LVEF 28% severely reduced, global longitudinal strain with an apical sparing bulls-eye pattern, severe global hypokinesis, diastolic dysfunction severely abnormal, severe biatrial dilation, patent foramen ovale with left to R shunting, mild to moderate MR, mild to moderate TR, PASP 49mmHg   Repeat Echo with improved EF 55%   NATHAN 2/9 very small PFO, no thrombus  Continue Toprol-XL 50 mg daily, losartan 25 mg daily, Aldactone 25 mg daily, aspirin 81 mg daily, Lipitor 40 mg daily  Did not tolerate Jardiance, Entresto stopped due to lightheadedness, takes Lasix 20 mg as needed  Outpatient cardiac MRI and Zio patch   Potassium hemolyzed on morning labs   BMP in 1 week

## 2024-02-10 NOTE — PROGRESS NOTES
[facility]  Progress Note  Name: Ellie Lazo I  MRN: [id number]  Unit/Bed#: E4 -01 I Date of Admission: 2/7/2024   Date of Service: 2/10/2024 I Hospital Day: 1    Assessment/Plan   * CVA (cerebral vascular accident) ([facility])  Assessment & Plan  Presents for evaluation of CVA. Seen by cardiology group today in routine visit, she noted to them that 10 days ago she had an episode of confusion, difficulty speaking, left hand weakness all of which spontaneously resolved. A stat MRI was completed.   MRI 02/07/2024 reviewed: acute/subacute cortical infarcts in R precentral gyrus and parietal lobe, no significant edema, mass effect, hemorrhagic transformation. Chronic left ganglioscapular and corona radiata lacunar infarcts, chronic R basal ganglia lacunar infarct, mild microangiopathic changes  carotid duplex: Less than 50% stenosis left and right internal carotid arteries  Etiology suspected to be cardioembolic  Recovered EF and PFO very small not the cause of stroke   MRI head and carotids pending  Echo: EF 55%, grade 1 diastolic dysfunction, mild TR  Is still on heparin gtt at this time, awaiting further eval with CT C/A/P and MRA's head/neck   Continue heparin drip at this time until pending tests result   Continue  aspirin 81 mg daily and atorvastatin 40 mg daily   May be able to go on DAPT and not DOAC if pending imaging negative   Will need outpt zio patch and thrombosis panel with ongoing hematology follow up   Follow up pending iimaging        Patent foramen ovale  Assessment & Plan  Hx of PFO , per cardiology very small on NATHAN unlikely cause of stroke     CKD (chronic kidney disease) stage 3, GFR 30-59 ml/min ([facility])  Assessment & Plan  Lab Results   Component Value Date    EGFR 45 02/10/2024    EGFR 43 02/09/2024    EGFR 43 02/08/2024    CREATININE 1.26 02/10/2024    CREATININE 1.31 (H) 02/09/2024    CREATININE 1.30 02/08/2024   Since 2022 Cr around 1.3-1.4   Within baseline  currently     Chronic systolic heart failure (HCC)  Assessment & Plan  Wt Readings from Last 3 Encounters:   02/08/24 63.2 kg (139 lb 5.3 oz)   02/07/24 61.2 kg (135 lb)   06/05/23 60.3 kg (133 lb)     Likely nonischemic, chronic daily alcohol use, questionable amyloid. Follows with [facility] heart failure group. Appears compensated   TTE 04/2023 reviewed: LVEF 28% severely reduced, global longitudinal strain with an apical sparing bulls-eye pattern, severe global hypokinesis, diastolic dysfunction severely abnormal, severe biatrial dilation, patent foramen ovale with left to R shunting, mild to moderate MR, mild to moderate TR, PASP 49mmHg   Repeat Echo with improved EF 55%   NATHAN 2/9 very small PFO, no thrombus  Continue Toprol-XL 50 mg daily, losartan 25 mg daily, Aldactone 25 mg daily, aspirin 81 mg daily, Lipitor 40 mg daily  Did not tolerate Jardiance, Entresto stopped due to lightheadedness, takes Lasix 20 mg as needed  Outpatient cardiac MRI  Potassium hemolyzed on morning labs       Other emphysema (HCC)  Assessment & Plan  History of tobacco/marijuana use. Reports quitting tobacco. No current exacerbation  PRN albuterol    Alcohol ingestion, 1-4 drinks per day  Assessment & Plan  Reports daily alcohol ingestion  Monitor for withdrawal  Oral thiamine, folic acid and multivitamin added    Mediastinal lymphadenopathy  Assessment & Plan  Patient had CTA chest in April 2023 with stable mild right paratracheal lymphadenopathy and enlarged subcarinal node since April 2023. Stability over one year suggests a benign etiology.   Was seen by hematology outpt recommending CT chest abdo pelvis   Pending imaging to rule out  occult malignancy for cause of stroke     Non-ischemic cardiomyopathy (HCC)  Assessment & Plan  EF on echo 55% ,. No the cause of stroke             VTE Pharmacologic Prophylaxis: VTE Score: 8 Moderate Risk (Score 3-4) - Pharmacological DVT Prophylaxis Ordered: heparin drip.    Mobility:   Basic Mobility  Inpatient Raw Score: 24  JH-HLM Goal: 8: Walk 250 feet or more  JH-HLM Achieved: 8: Walk 250 feet ot more  HLM Goal achieved. Continue to encourage appropriate mobility.    Patient Centered Rounds: I performed bedside rounds with nursing staff today.   Discussions with Specialists or Other Care Team Provider: Dr. Shah    Education and Discussions with Family / Patient: Patient declined call to .     Total Time Spent on Date of Encounter in care of patient:  mins. This time was spent on one or more of the following: performing physical exam; counseling and coordination of care; obtaining or reviewing history; documenting in the medical record; reviewing/ordering tests, medications or procedures; communicating with other healthcare professionals and discussing with patient's family/caregivers.  Current Length of Stay: 1 day(s)  Current Patient Status: Inpatient   Certification Statement: The patient will continue to require additional inpatient hospital stay due to MRA and T C/A/P   Discharge Plan: Anticipate discharge tomorrow to home.    Code Status: Level 1 - Full Code    Subjective:   Patient doing well no acute complaints.  No slurred speech confusion or new neurodeficits.  She understands the plan to wait for the CT chest abdomen pelvis and MRI head/neck to determine medications to go home on.    Objective:     Vitals:   Temp (24hrs), Av.5 °F (36.4 °C), Min:96.4 °F (35.8 °C), Max:98.3 °F (36.8 °C)    Temp:  [96.4 °F (35.8 °C)-98.3 °F (36.8 °C)] 97.9 °F (36.6 °C)  HR:  [66-94] 80  Resp:  [16-18] 18  BP: (134-172)/() 143/84  SpO2:  [92 %-96 %] 95 %  Body mass index is 25.48 kg/m².     Input and Output Summary (last 24 hours):     Intake/Output Summary (Last 24 hours) at 2/10/2024 1031  Last data filed at 2/10/2024 0803  Gross per 24 hour   Intake 1074.17 ml   Output 0 ml   Net 1074.17 ml       Physical Exam:   Physical Exam  Vitals and nursing note reviewed.   Cardiovascular:      Rate and  Rhythm: Normal rate and regular rhythm.   Pulmonary:      Effort: Pulmonary effort is normal. No respiratory distress.      Breath sounds: Normal breath sounds.   Abdominal:      General: Bowel sounds are normal.      Palpations: Abdomen is soft.   Musculoskeletal:      Right lower leg: No edema.      Left lower leg: No edema.   Neurological:      General: No focal deficit present.      Mental Status: She is alert. Mental status is at baseline.   Psychiatric:         Mood and Affect: Mood normal.          Additional Data:     Labs:  Results from last 7 days   Lab Units 02/10/24  0434 02/09/24  0653   WBC Thousand/uL 14.17* 10.02   HEMOGLOBIN g/dL 14.3 14.6   HEMATOCRIT % 42.5 42.6   PLATELETS Thousands/uL 719* 596*   NEUTROS PCT %  --  53   LYMPHS PCT %  --  31   MONOS PCT %  --  10   EOS PCT %  --  4     Results from last 7 days   Lab Units 02/10/24  0511 02/08/24  0751 02/08/24  0009   SODIUM mmol/L 137   < > 138   POTASSIUM mmol/L 5.0   < > 4.4   CHLORIDE mmol/L 110*   < > 105   CO2 mmol/L 20*   < > 26   BUN mg/dL 21   < > 27*   CREATININE mg/dL 1.26   < > 1.58*   ANION GAP mmol/L 7   < > 7   CALCIUM mg/dL 9.0   < > 9.0   ALBUMIN g/dL  --   --  4.1   TOTAL BILIRUBIN mg/dL  --   --  0.24   ALK PHOS U/L  --   --  136*   ALT U/L  --   --  35   AST U/L  --   --  43*   GLUCOSE RANDOM mg/dL 90   < > 94    < > = values in this interval not displayed.     Results from last 7 days   Lab Units 02/08/24  0009   INR  1.09     Results from last 7 days   Lab Units 02/07/24  2214   POC GLUCOSE mg/dl 143*     Results from last 7 days   Lab Units 02/08/24  0543   HEMOGLOBIN A1C % 5.3           Lines/Drains:  Invasive Devices       Peripheral Intravenous Line  Duration             Peripheral IV 02/08/24 Right;Ventral (anterior) Forearm 2 days    Peripheral IV 02/10/24 Dorsal (posterior);Left Forearm <1 day                      Telemetry:  Telemetry Orders (From admission, onward)               24 Hour Telemetry Monitoring   Continuous x 24 Hours (Telem)        Question:  Reason for 24 Hour Telemetry  Answer:  TIA/Suspected CVA/ Confirmed CVA                     Telemetry Reviewed: Normal Sinus Rhythm  Indication for Continued Telemetry Use: Acute CVA             Imaging: awwaitign MRAs and CT C/A/P     Recent Cultures (last 7 days):         Last 24 Hours Medication List:   Current Facility-Administered Medications   Medication Dose Route Frequency Provider Last Rate    acetaminophen  650 mg Oral Q4H PRN Rojas Brenner PA-C      albuterol  2 puff Inhalation Q4H PRN Lillian Cruz PA-C      aluminum-magnesium hydroxide-simethicone  30 mL Oral Q6H PRN Rojas Brenner PA-C      aspirin  81 mg Oral Daily Regina Weston MD      atorvastatin  40 mg Oral QPM Rojas Brenner PA-C      folic acid  1 mg Oral Daily Lillian Cruz PA-C      heparin (porcine)  3-24 Units/kg/hr (Order-Specific) Intravenous Titrated Rojas Brenner PA-C 17 Units/kg/hr (02/10/24 0804)    hydrALAZINE  5 mg Intravenous Q6H PRN Rojas Brenner PA-C      LORazepam  1 mg Intravenous Once PRN Lillian Cruz PA-C      losartan  25 mg Oral Daily Lillian Cruz PA-C      melatonin  6 mg Oral HS Mackenzie Smith PA-C      metoprolol succinate  50 mg Oral Daily Rojas Brenner PA-C      multivitamin-minerals  1 tablet Oral Daily Lillian Cruz PA-C      ondansetron  4 mg Intravenous Q6H PRN Rojas Brenner PA-C      polyethylene glycol  17 g Oral Daily PRN Rojas Brenner PA-C      spironolactone  25 mg Oral Daily Rojas Brenner PA-C      thiamine  100 mg Oral Daily Lillian Cruz PA-C          Today, Patient Was Seen By: Lillian Cruz PA-C    **Please Note: This note may have been constructed using a voice recognition system.**

## 2024-02-10 NOTE — ASSESSMENT & PLAN NOTE
Lab Results   Component Value Date    EGFR 45 02/10/2024    EGFR 43 02/09/2024    EGFR 43 02/08/2024    CREATININE 1.26 02/10/2024    CREATININE 1.31 (H) 02/09/2024    CREATININE 1.30 02/08/2024   Since 2022 Cr around 1.3-1.4   Within baseline currently

## 2024-02-10 NOTE — PLAN OF CARE
Problem: Knowledge Deficit  Goal: Patient/family/caregiver demonstrates understanding of disease process, treatment plan, medications, and discharge instructions  Description: Complete learning assessment and assess knowledge base.  Interventions:  - Provide teaching at level of understanding  - Provide teaching via preferred learning methods  Outcome: Progressing     Problem: NEUROSENSORY - ADULT  Goal: Achieves stable or improved neurological status  Description: INTERVENTIONS  - Monitor and report changes in neurological status  - Monitor vital signs such as temperature, blood pressure, glucose, and any other labs ordered   - Initiate measures to prevent increased intracranial pressure  - Monitor for seizure activity and implement precautions if appropriate      Outcome: Progressing     Problem: CARDIOVASCULAR - ADULT  Goal: Absence of cardiac dysrhythmias or at baseline rhythm  Description: INTERVENTIONS:  - Continuous cardiac monitoring, vital signs, obtain 12 lead EKG if ordered  - Administer antiarrhythmic and heart rate control medications as ordered  - Monitor electrolytes and administer replacement therapy as ordered  Outcome: Progressing

## 2024-02-11 DIAGNOSIS — I63.9 CEREBROVASCULAR ACCIDENT (CVA), UNSPECIFIED MECHANISM (HCC): Primary | ICD-10-CM

## 2024-02-12 DIAGNOSIS — I50.9 CHF (CONGESTIVE HEART FAILURE) (HCC): ICD-10-CM

## 2024-02-12 NOTE — UTILIZATION REVIEW
NOTIFICATION OF ADMISSION DISCHARGE   This is a Notification of Discharge from [facility]. Please be advised that this patient has been discharge from our facility. Below you will find the admission and discharge date and time including the patient’s disposition.   UTILIZATION REVIEW CONTACT:  Elissa Driver MA  Utilization   Network Utilization Review Department  Phone: [phone] x carefully listen to the prompts. All voicemails are confidential.  Email: [email or web address]     ADMISSION INFORMATION  PRESENTATION DATE: 2/7/2024 10:08 PM  OBERVATION ADMISSION DATE:   INPATIENT ADMISSION DATE: 2/9/24  2:48 PM   DISCHARGE DATE: 2/10/2024  4:45 PM   DISPOSITION:Home/Self Care    Network Utilization Review Department  ATTENTION: Please call with any questions or concerns to [phone] and carefully listen to the prompts so that you are directed to the right person. All voicemails are confidential.   For Discharge needs, contact Care Management DC Support Team at [phone] opt. 2  Send all requests for admission clinical reviews, approved or denied determinations and any other requests to dedicated fax number below belonging to the campus where the patient is receiving treatment. List of dedicated fax numbers for the Facilities:  FACILITY NAME UR FAX NUMBER   ADMISSION DENIALS (Administrative/Medical Necessity) [phone]   DISCHARGE SUPPORT TEAM ([facility]) [phone]   PARENT CHILD HEALTH (Maternity/NICU/Pediatrics) [phone]   [facility] [phone]   [facility] [phone]   [facility] [phone]   [facility] [phone]   [facility] [phone]   [facility] [phone]   [facility] [phone]   [facility] [phone]    [facility] [phone]   [facility] [phone]   [facility] [phone]   [facility] [phone]

## 2024-02-13 ENCOUNTER — TELEPHONE (OUTPATIENT)
Dept: CARDIOLOGY CLINIC | Facility: CLINIC | Age: 64
End: 2024-02-13

## 2024-02-13 NOTE — TELEPHONE ENCOUNTER
Received a call from patient's daughter Candice today on the [facility] clinical line.     Daughter said patient was discharged from [facility] on 2/10 on new meds.  Since discharge, patient has been complaining of stomach cramps and today, is complaining of severe stomach pain which patient is convinced  is caused by new meds .  Patient no longer wishes to continue taking Aspirin, Plavix, Losartan and atorvastatin which are the newest meds added at discharge.    Daughter states mother has no history of stomach pain or discomfort in the past.

## 2024-02-15 RX ORDER — METOPROLOL SUCCINATE 50 MG/1
50 TABLET, EXTENDED RELEASE ORAL DAILY
Qty: 90 TABLET | Refills: 2 | Status: SHIPPED | OUTPATIENT
Start: 2024-02-15

## 2024-02-19 ENCOUNTER — TELEPHONE (OUTPATIENT)
Dept: NEUROLOGY | Facility: CLINIC | Age: 64
End: 2024-02-19

## 2024-02-19 ENCOUNTER — TELEPHONE (OUTPATIENT)
Dept: CARDIOLOGY CLINIC | Facility: CLINIC | Age: 64
End: 2024-02-19

## 2024-02-19 ENCOUNTER — TELEPHONE (OUTPATIENT)
Dept: HEMATOLOGY ONCOLOGY | Facility: CLINIC | Age: 64
End: 2024-02-19

## 2024-02-19 NOTE — TELEPHONE ENCOUNTER
Post CVA Discharge Follow Up  Hospitalization: 2/7/24-2/10/24    Called patient with no answer. Left a voice message requesting for a call. Provided the office's phone number.

## 2024-02-19 NOTE — TELEPHONE ENCOUNTER
Appointment Schedule   Who are you speaking with? Patient   If it is not the patient, are they listed on an active communication consent form? Yes   Which provider is the appointment scheduled with? CARMEN Martínez   At which location is the appointment scheduled for? [place]   When is the appointment scheduled?  Please list date and time 2/23/24   What is the reason for this appointment? Follow up    Did patient voice understanding of the details of this appointment? Yes   Was the no show policy reviewed with patient? Yes

## 2024-02-19 NOTE — TELEPHONE ENCOUNTER
Left message patient voice mail.  Dr. Montano ordering Zio Monitor post hospitalization.  Left message to call the office to review instructions.

## 2024-02-19 NOTE — TELEPHONE ENCOUNTER
----- Message from Brad Montano MD sent at 2/11/2024  2:02 PM EST -----  Hi this patient was hospitalized for new CVA.  I ordered her for a 2-week Zio patch.  Can someone please call her to set this up?  She has been following with Yue from heart failure, so she can see her in the office to review the Zio patch or she can see me in the office in 6-8 weeks.

## 2024-02-23 ENCOUNTER — OFFICE VISIT (OUTPATIENT)
Dept: HEMATOLOGY ONCOLOGY | Facility: CLINIC | Age: 64
End: 2024-02-23
Payer: MEDICARE

## 2024-02-23 VITALS
BODY MASS INDEX: 25.8 KG/M2 | DIASTOLIC BLOOD PRESSURE: 108 MMHG | OXYGEN SATURATION: 96 % | HEART RATE: 102 BPM | RESPIRATION RATE: 17 BRPM | WEIGHT: 140.2 LBS | HEIGHT: 62 IN | SYSTOLIC BLOOD PRESSURE: 170 MMHG | TEMPERATURE: 97.6 F

## 2024-02-23 DIAGNOSIS — R76.8 ELEVATED SERUM IMMUNOGLOBULIN FREE LIGHT CHAIN LEVEL: ICD-10-CM

## 2024-02-23 DIAGNOSIS — D72.829 LEUKOCYTOSIS, UNSPECIFIED TYPE: Primary | ICD-10-CM

## 2024-02-23 DIAGNOSIS — D53.9 NUTRITIONAL ANEMIA: ICD-10-CM

## 2024-02-23 DIAGNOSIS — D75.839 THROMBOCYTOSIS: ICD-10-CM

## 2024-02-23 PROCEDURE — 99214 OFFICE O/P EST MOD 30 MIN: CPT

## 2024-02-23 NOTE — PROGRESS NOTES
HEMATOLOGY / ONCOLOGY CLINIC FOLLOW UP NOTE    Primary Care Provider: No primary care provider on file.  Referring Provider:    MRN: [id number]  : 1960    Reason for Encounter: leukocytosis and thrombocytosis       Interval History: Patient presents for evaluation for leukocytosis and thrombocytosis.  She presents in the office with her daughter Candice. She has PMH of COPD, Non-ischemic cardiomyopathy. She was recently hospitalized for heart failure with EF 28%, strain pattern on echo concerning for amyloid, patient scheduled for cardiac MRI. Patient is a former cigarette smoker, quit about a year ago. She was smoking a half a pack a day. She drinks alcohol daily.       Patient was previously seen in the office 2023 for elevated free light chains.  Workup was negative for a monoclonal bands.  She was discharged to follow up with her PCP.  A CT chest in  showed stable mild right paratracheal lymphadenopathy, recent CT chest abdomen pelvis (2/10/24) shows resolution.  In addition, spleen and liver are unremarkable.    She was recently presented to the ED on 2/10/24,  due to a abnormal MRI showing acute or subacute cortical infarcts in the right precentral gyrus and right parietal lobe; Chronic left gangliocapsular and corona radiata lacunar infarcts. Chronic right basal ganglia lacunar infarcts. Mild microangiopathic change.  She was diagnosed with CVA suspect cardio-embolic etiology cannot exclude vessel vessel atheroma per neurology.  Patient discharged on ASA 81 mg and Plavix 75 mg daily.    She was noted to have leukocytosis and thrombocytosis.  She is also noted to have macrocytosis.  Thrombocytosis is noted since . Patient endorses fatigue.  Patient denies  fevers, frequent infections, drenching night sweats, unintentional weight loss, decreased appetite.  No HAs, blurry vision or erythromelalgia.     Labs:  2/10/24: WBC 14.17, Platelets 719,000, Hgb 14.3, , creatinine 1.26, eGFR  45.    Patient has a personal history of cervical cancer in 1986, not requiring treatment. Her father had lung cancer, paternal aunt had breast cancer.    REVIEW OF SYSTEMS:  Please note that a 14-point review of systems was performed to include Constitutional, HEENT, Respiratory, CVS, GI, , Musculoskeletal, Integumentary, Neurologic, Rheumatologic, Endocrinologic, Psychiatric, Lymphatic, and Hematologic/Oncologic systems were reviewed and are negative unless otherwise stated in HPI. Positive and negative findings pertinent to this evaluation are incorporated into the history of present illness.      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   Discussed patient's thrombocytosis could be related to inflammation/infections vs, iron deficiency vs myeloproliferative disorders. The etiology of macrocytosis can be related to vitamin b12 deficiency vs liver disease vs alcohol abuse vs thyroid disorder.     We discussed the etiology of her leukocytosis, thrombocytosis and macrocytosis is unclear at this time.  Leukocytosis may be related to her long history of smoking.  We will obtain CBCD, Iron panel, vitamin B12, folate, CRP, Sed rate, BCR/ABL, MPN panel, LDH, flow cytometry.  Since her light chains have not been checked in over a year, we will repeat spep, FLC, Immunoglobulins, kappa/lamda 24 hr urine.  We will see patient back in the office in 3-4 weeks to review the labs and formulate  a plan.    Patient and her daughter are agreeable with plan.  Patient aware to contact us for worsening symptoms or for additional questions/concerns.        I spent 30 minutes on chart review, face to face counseling time, coordination of care and documentation.    Past Medical History:   has a past medical history of CHF (congestive heart failure) (HCC) and Hypertension.    PAST SURGICAL HISTORY:   has a past surgical history that includes Hip surgery; Cervical cone biopsy; FL guided needle plac bx/asp/inj (10/21/2020); Hip surgery; and Cardiac catheterization (N/A, 6/6/2022).    CURRENT MEDICATIONS  Current Outpatient Medications   Medication Sig Dispense Refill    albuterol (PROVENTIL HFA,VENTOLIN HFA) 90 mcg/act inhaler Inhale 2 puffs every 4 (four) hours as needed for wheezing 18 g 0    metoprolol succinate (TOPROL-XL) 50 mg 24 hr tablet Take 1 tablet (50 mg total) by mouth daily 90 tablet 2    spironolactone (ALDACTONE) 25 mg tablet Take 1 tablet (25 mg total) by mouth daily 90 tablet 3    ALPRAZolam (XANAX) 0.25 mg tablet Take 1 tablet (0.25 mg total) by mouth once as needed for anxiety (for anxiety/claustrophobia (take one, additonal one as needed)) for up to 2 doses Do not drive after taking this medication. (Patient not taking: Reported on 2/23/2024) 2 tablet 0    aspirin 81 mg chewable tablet Chew 1 tablet (81 mg total) daily (Patient not taking: Reported on 2/23/2024) 30 tablet 0    atorvastatin (LIPITOR) 40 mg tablet Take 1 tablet (40 mg total) by mouth every evening (Patient not taking: Reported on 2/23/2024) 30 tablet 0    clopidogrel (PLAVIX) 75 mg tablet Take 1 tablet (75 mg total) by mouth daily for 20 days Do not start before February 11, 2024. (Patient not taking: Reported on 2/23/2024) 20 tablet 0    losartan (COZAAR) 25 mg tablet Take 1 tablet (25 mg total) by mouth daily (Patient not taking: Reported on 2/23/2024) 30 tablet 0     No current facility-administered medications for this visit.  "    [unfilled]    SOCIAL HISTORY:   reports that she has been smoking cigarettes. She has never used smokeless tobacco. She reports current alcohol use of about 4.0 standard drinks of alcohol per week. She reports current drug use. Drug: Marijuana.     FAMILY HISTORY:  family history includes COPD in her mother; Diabetes in her mother; Hyperlipidemia in her mother; Lung cancer in her father; Stroke in her mother.     ALLERGIES:  is allergic to codeine, oxycodone-acetaminophen, and propoxyphene.      Physical Exam:  Vital Signs:   Visit Vitals  BP (!) 170/108 (BP Location: Left arm, Patient Position: Sitting, Cuff Size: Adult)   Pulse 102   Temp 97.6 °F (36.4 °C)   Resp 17   Ht 5' 2\" (1.575 m)   Wt 63.6 kg (140 lb 3.2 oz)   SpO2 96%   BMI 25.64 kg/m²   OB Status Postmenopausal   Smoking Status Some Days   BSA 1.64 m²     Body mass index is 25.64 kg/m².  Body surface area is 1.64 meters squared.    GEN: Alert, awake oriented x3, in no acute distress  HEENT- No pallor, icterus, cyanosis, no oral mucosal lesions,   LAD - no palpable cervical, clavicle, axillary, inguinal LAD  Heart- normal S1 S2, regular rate and rhythm, No murmur, rubs.   Lungs- clear breathing sound bilateral.   Abdomen- soft, Non tender, bowel sounds present  Extremities- No cyanosis, clubbing, edema  Neuro- No focal neurological deficit    Labs:  Lab Results   Component Value Date    WBC 14.17 (H) 02/10/2024    HGB 14.3 02/10/2024    HCT 42.5 02/10/2024     (H) 02/10/2024     (H) 02/10/2024     Lab Results   Component Value Date    SODIUM 137 02/10/2024    K 5.0 02/10/2024     (H) 02/10/2024    CO2 20 (L) 02/10/2024    AGAP 7 02/10/2024    BUN 21 02/10/2024    CREATININE 1.26 02/10/2024    GLUC 90 02/10/2024    GLUF 94 02/09/2024    CALCIUM 9.0 02/10/2024    AST 43 (H) 02/08/2024    ALT 35 02/08/2024    ALKPHOS 136 (H) 02/08/2024    TP 7.3 02/08/2024    TBILI 0.24 02/08/2024    EGFR 45 02/10/2024     "

## 2024-02-23 NOTE — TELEPHONE ENCOUNTER
Post CVA Discharge Follow Up  Hospitalization: 2/7/24-2/10/24    Called patient with no answer. Left a voice message requesting for a call. Provided the office's phone number.      3rd attempt: Mailed unable to reach letter.

## 2024-02-25 NOTE — PROGRESS NOTES
Advanced Heart Failure / Pulmonary Hypertension Outpatient Visit    Ellie Sharmar 63 y.o. female   MRN: [id number]  Encounter: [id number]    Assessment:  Patient Active Problem List    Diagnosis Date Noted    Chronic systolic heart failure (HCC) 04/04/2023    Tooth infection 06/04/2022    Other emphysema ([facility]) 06/04/2022    Mediastinal lymphadenopathy 06/03/2022    Alcohol ingestion, 1-4 drinks per day 06/03/2022    Chronic hip pain after total replacement of right hip joint 09/29/2020    Leukocytosis 02/23/2024    Thrombocytosis 02/23/2024    Nutritional anemia 02/23/2024    Elevated serum immunoglobulin free light chain level 02/23/2024    CKD (chronic kidney disease) stage 3, GFR 30-59 ml/min ([facility]) 02/09/2024    Patent foramen ovale 02/09/2024    CVA (cerebral vascular accident) ([facility]) 02/08/2024       Today's Plan:  Sent from last visit for stat MRI revealing acute/subacute cortical infarcts in right precentral gyrus and parietal lobe, as well as chronic infarcts as detailed below. Started on ASA, Plavix (for 21 days) and statin. Has outpatient neurology follow up scheduled. Emphasized necessity of compliance with these medications.   Repeat TTE while admitted with very small PFO and recovered EF. Pending Zio patch as ordered by cardiology.  cMRI not yet completed, will schedule.  Blood pressure above goal. Losartan 25 mg QD added to regimen. Did not tolerate Entresto or Jardiance previously.  Strain pattern on prior echo concerning for possible amyloid. AL screening labs sent and cardiac MRI ordered at previous visit. Serum free light chains abnormal; referred to hematology; following with them as an outpatient. Repeat labs ordered at her last visit with heme/onc.    Emphasized lifestyle and dietary modifications.  Daily weights, 2g sodium restriction, 2L fluid restriction.    Plan:  Chronic HFimpEF; LVEF 55%; LVIDd 4.6 cm; NYHA II; ACC/AHA Stage C   Etiology: nonischemic. Chronic daily ETOH use. ?amyloid.  Planned for outpatient cardiac MRI. Now with recovered LVEF on most recent TTE.      Weight of 138 lbs on 4/7/23. 139 lbs on 4/13, 135 lbs, 135 lbs. Today, weighs 136 lbs.   Most recent BMP from 2/10/24: Na 137, K 5.0 (moderately hemolyzed), creatinine 1.26, BUN 21, eGFR 45.     TTE 6/3/22: LVEF 28%.  TTE 9/8/22: LVEF 35%. LA moderately dilated. Small patent foramen ovale noted atrial septum. IVC normal.   TTE 11/28/22: LVEF 44%. Mild global hypokinesis.  TTE 4/5/23: LVEF 28%, global longitudinal strain reduced at -8% with an apical sparing bulls-eye pattern, severe global hypokinesis with regional variation. Diastolic function severely abnormal consistent with ungraded restrictive relaxation.  Severe biatrial dilatation. Patent foramen ovale atrial septum with left to right shunting. Mild to moderate MR, mild to moderate TR, PASP 49 mmHg. IVC dilated.   TTE 2/ /23: LVEF 55%. LVIDd 4.6 cm. Grade I DD. LA moderately dilated, mild TR.     LHC 6/6/22: Normal epicardial coronary arteries.     No voltage to mass mismatch evident on EKG.     Pharmacotherapies / Neurohormonal Blockade:  --Beta Blocker: metoprolol succinate 50 mg daily.  --ARNi / ACEi / ARB: Losartan 25 mg QD. Entresto stopped due to lightheadedness in the past   --Aldosterone Antagonist: Spironolactone 25 mg daily  --SGLT2 Inhibitor: did not tolerate Jardiance   --Diuretic: Lasix 20 mg PRN, has not needed    Sudden Cardiac Death Risk Reduction:  --ICD: LVEF 55%.     Electrical Resynchronization:  --Candidacy for BiV device: narrow QRSd    Advanced Therapies: Will continue to monitor.  --Inotrope:  --LVAD/Transplant Candidacy:    CVA  10 days prior to last outpatient visit, she had an episode of confusion, difficulty speaking, left hand weakness all of which spontaneously resolved. A stat MRI was completed.  MRI 02/07/2024: acute/subacute cortical infarcts in R precentral gyrus and parietal lobe, no significant edema, mass effect, hemorrhagic  transformation. Chronic left ganglioscapular and corona radiata lacunar infarcts, chronic R basal ganglia lacunar infarct, mild microangiopathic changes  Carotid duplex 2/8/24: Less than 50% stenosis left and right internal carotid arteries  MRA head/neck 2/10/24: moderate right paraophthalmic ICA stenosis, no large vessel occlusion, unremarkable MR angiogram of the cervical vasculature    Pulmonary hypertension  Group II and possible group III with dilated cardiomyopathy and +/- COPD  Chronic daily alcohol use  Extensive history tobacco abuse  Chronic leukocytosis  Thrombocytosis    HPI:   Ellie Lazo is a 62-year-old female who presents for follow up. PMH includes nonischemic dilated cardiomyopathy, HFrEF with LVEF 28%, pulmonary hypertension, chronic alcohol use, chronic leukocytosis, tobacco use. She was recently admitted to [facility] with weight gain, shortness of breath with exertion, and orthopnea. She was reportedly not taking her HF medications at home, and weight was up approximately 20 lbs (dry weight ~135 lbs, weight on admission 155 lbs). BNP on admission 2800. CXR with cardiomegaly, CTA PE study with no PE, interstitial edema, healing left lateral 6th rib fracture, and stable mild right paratracheal lymphadenopathy and enlarged subcarinal node since April 2023.     She was diuresed with IV Lasix, and magnesium was repleted with IV mag. She developed a mild ADRYAN with creatinine 1.66 (baseline 0.8-1.2), and spironolactone and oral diuretics were held at discharge with plans to resume outpatient. Weight at discharge 138 lbs. Inpatient TTE concerning for ?amyloid; planned for outpatient cardiac MRI.     4/13/23: presents today for follow up. Weights have been stable at home. No SOB, PND, orthopnea. No chest pain, palpitations, near syncope. No LE edema or abdominal distension. Intermittent compliance with medications previously; never took Jardiance when prescribed it, intermittent compliance with  spironolactone, was not taking metoprolol prior to hospitalization. Started this after discharge. Hasn't been taking losartan since the ivanna. Unsure how much she's drinking, thinks diet is adequate. Denies excess sodium consumption. Reports that she experienced SOB when she was volume overloaded, but denies any symptoms consistent with that since discharge.    Reports that her mother had heart failure starting in her 40s, no known history of CAD. No family history sudden cardiac death. Recently quit smoking. Has been drinking approximately 2 drinks (rum and coke) per day for many years, denies alcohol use since discharge.    4/27/23: presents today for follow up. Feeling great, no SOB/JACKSON. No chest pain, palpitations, dizziness. No LE edema, PND, orthopnea. Compliant with medications - started the spironolactone as directed after last visit. Felt much better after taking low dose Lasix x 3 days after last visit. Weights have been stable (138, 139 lbs). Trying to stay away from salt, drinking 32-48 oz of liquid per day - mix of water and iced tea. Urinating well - urinated a lot when taking Lasix. Amenable to restarting/taking recommended GDMT going forward.    5/17/23: presents today for follow up. Feeling good. Did not tolerate taking Jardiance; felt dizzy, nauseous, lightheaded. Symptoms started within 1-2 days of starting the medication and completely resolved after stopping it. Otherwise denies SOB, PND, orthopnea, LE swelling, abdominal distension. Weights have been stable. Has been staying within 2L fluid restriction, doing her best with salt. Cardiac MRI scheduled for next month. Follow up with [place] cardiologist scheduled for next month. Has not needed to take any additional Lasix.     2/7/23: presents today for follow up. Saw Dr. Navarro in 6/2023 and started Entrestro, but experienced significant lightheadedness and felt unwell while taking it, so stopped it.  Has not yet gotten her cardiac MRI as  ordered.      Presents today for an acute visit; she reports that she noticed confusion/brain fog and trouble speaking approximately 10 days ago in the late afternoon of 1/28/2024.  Her daughter is present today and reports that she also noticed the confusion and trouble verbalizing, but thought that this was due to alcohol as patient drinks regularly.  She woke up the next morning and noticed weakness in her left hand, which has significantly improved but has not completely resolved at this time.  She did not seek medical attention at the time.  Also noted a right-sided headache at the time, now resolved.  Denies vision changes, facial droop, or other areas of focal weakness.  Denies any recent trauma, falls, syncope.    Has no cardiac complaints; denies chest pain, shortness of breath, lower extremity swelling, weight gain.  Has not been weighing herself regularly and has not been tracking her salt and fluid intake closely.  Has not needed to take Lasix.  Has been taking metoprolol and spironolactone.    2/26/23: Presents today for follow up. Stat MRI brain ordered at last visit with acute/subacute cortical infarcts in right precentral gyrus and parietal lobe, as well as chronic infarcts. Started on ASA, Plavix (for 21 days) and statin. Repeat TTE while admitted with very small PFO and recovered EF. Pending Zio patch as ordered by cardiology. Has not had any recurrent symptoms since discharge. Has occasional SOB when smoking but denies JACKSON, PND, orthopnea, LE swelling. Has not weighed herself at home, needs a new battery for her scale. Has not been tracking sodium or fluid intake. Had concerns about cramping with new medications s/p hospitalization so was not taking Plavix or statin. Emphasized the need to take these, especially the statin going forward. Will try them again.    Past Medical History:   Diagnosis Date    CHF (congestive heart failure) (HCC)     Hypertension      Review of Systems   Constitutional:   Negative for chills, fever and unexpected weight change.   HENT:  Negative for ear pain and sore throat.    Eyes:  Negative for pain and visual disturbance.   Respiratory:  Negative for cough and shortness of breath.    Cardiovascular:  Negative for chest pain, palpitations and leg swelling.   Gastrointestinal:  Negative for abdominal pain and vomiting.   Genitourinary:  Negative for dysuria and hematuria.   Musculoskeletal:  Negative for arthralgias and back pain.   Skin:  Negative for color change and rash.   Neurological:  Negative for seizures, syncope and weakness.   All other systems reviewed and are negative.  14-point ROS completed and negative except as stated above and/or in the HPI.      Allergies   Allergen Reactions    Codeine GI Intolerance and Vomiting    Oxycodone-Acetaminophen GI Intolerance and Vomiting    Propoxyphene Vomiting       Current Outpatient Medications:     ALPRAZolam (XANAX) 0.25 mg tablet, Take 1 tablet (0.25 mg total) by mouth once as needed for anxiety (for anxiety/claustrophobia (take one, additonal one as needed)) for up to 2 doses Do not drive after taking this medication., Disp: 2 tablet, Rfl: 0    aspirin 81 mg chewable tablet, Chew 1 tablet (81 mg total) daily, Disp: 30 tablet, Rfl: 0    losartan (COZAAR) 25 mg tablet, Take 1 tablet (25 mg total) by mouth daily, Disp: 30 tablet, Rfl: 3    metoprolol succinate (TOPROL-XL) 50 mg 24 hr tablet, Take 1 tablet (50 mg total) by mouth daily, Disp: 90 tablet, Rfl: 2    spironolactone (ALDACTONE) 25 mg tablet, Take 1 tablet (25 mg total) by mouth daily, Disp: 90 tablet, Rfl: 3    albuterol (PROVENTIL HFA,VENTOLIN HFA) 90 mcg/act inhaler, Inhale 2 puffs every 4 (four) hours as needed for wheezing (Patient not taking: Reported on 2/26/2024), Disp: 18 g, Rfl: 0    atorvastatin (LIPITOR) 40 mg tablet, Take 1 tablet (40 mg total) by mouth every evening (Patient not taking: Reported on 2/23/2024), Disp: 30 tablet, Rfl: 0    clopidogrel  "(PLAVIX) 75 mg tablet, Take 1 tablet (75 mg total) by mouth daily for 20 days Do not start before February 11, 2024. (Patient not taking: Reported on 2/23/2024), Disp: 20 tablet, Rfl: 0    Social History     Socioeconomic History    Marital status: /Civil Union     Spouse name: Not on file    Number of children: Not on file    Years of education: Not on file    Highest education level: Not on file   Occupational History    Not on file   Tobacco Use    Smoking status: Some Days     Current packs/day: 0.50     Types: Cigarettes    Smokeless tobacco: Never   Vaping Use    Vaping status: Never Used   Substance and Sexual Activity    Alcohol use: Yes     Alcohol/week: 4.0 standard drinks of alcohol     Types: 4 Shots of liquor per week    Drug use: Yes     Types: Marijuana    Sexual activity: Yes     Partners: Male   Other Topics Concern    Not on file   Social History Narrative    Not on file     Social Determinants of Health     Financial Resource Strain: Not on file   Food Insecurity: No Food Insecurity (2/9/2024)    Hunger Vital Sign     Worried About Running Out of Food in the Last Year: Never true     Ran Out of Food in the Last Year: Never true   Transportation Needs: No Transportation Needs (2/9/2024)    PRAPARE - Transportation     Lack of Transportation (Medical): No     Lack of Transportation (Non-Medical): No   Physical Activity: Not on file   Stress: Not on file   Social Connections: Not on file   Intimate Partner Violence: Not on file   Housing Stability: Low Risk  (2/9/2024)    Housing Stability Vital Sign     Unable to Pay for Housing in the Last Year: No     Number of Places Lived in the Last Year: 1     Unstable Housing in the Last Year: No     Family History   Problem Relation Age of Onset    Diabetes Mother     COPD Mother     Hyperlipidemia Mother     Stroke Mother     Lung cancer Father        Vitals:  Blood pressure 170/100, pulse 85, height 5' 2\" (1.575 m), weight 61.9 kg (136 lb 8 oz), " "SpO2 97%, currently breastfeeding.  Body mass index is 24.97 kg/m².  Wt Readings from Last 10 Encounters:   02/26/24 61.9 kg (136 lb 8 oz)   02/23/24 63.6 kg (140 lb 3.2 oz)   02/08/24 63.2 kg (139 lb 5.3 oz)   02/07/24 61.2 kg (135 lb)   06/05/23 60.3 kg (133 lb)   05/17/23 61.2 kg (135 lb)   05/08/23 63.5 kg (140 lb)   04/27/23 64.4 kg (142 lb)   04/13/23 63.1 kg (139 lb 3.2 oz)   04/07/23 62.8 kg (138 lb 7.2 oz)     Vitals:    02/26/24 1046   BP: 170/100   BP Location: Right arm   Patient Position: Sitting   Cuff Size: Standard   Pulse: 85   SpO2: 97%   Weight: 61.9 kg (136 lb 8 oz)   Height: 5' 2\" (1.575 m)           Physical Exam  Constitutional:       Appearance: Normal appearance.   HENT:      Head: Normocephalic.      Nose: Nose normal.      Mouth/Throat:      Mouth: Mucous membranes are moist.   Eyes:      Conjunctiva/sclera: Conjunctivae normal.   Neck:      Vascular: No JVD.   Cardiovascular:      Rate and Rhythm: Normal rate and regular rhythm.   Pulmonary:      Effort: Pulmonary effort is normal.      Breath sounds: Normal breath sounds.   Musculoskeletal:      Cervical back: Neck supple.      Right lower leg: No edema.      Left lower leg: No edema.   Skin:     General: Skin is warm and dry.   Neurological:      Mental Status: She is alert and oriented to person, place, and time.   Psychiatric:         Mood and Affect: Mood normal.         Behavior: Behavior normal.         Labs & Results:  Lab Results   Component Value Date    WBC 14.17 (H) 02/10/2024    HGB 14.3 02/10/2024    HCT 42.5 02/10/2024     (H) 02/10/2024     (H) 02/10/2024     Lab Results   Component Value Date    SODIUM 137 02/10/2024    K 5.0 02/10/2024     (H) 02/10/2024    CO2 20 (L) 02/10/2024    BUN 21 02/10/2024    CREATININE 1.26 02/10/2024    GLUC 90 02/10/2024    CALCIUM 9.0 02/10/2024     Lab Results   Component Value Date    INR 1.09 02/08/2024    PROTIME 14.3 02/08/2024     Lab Results   Component Value Date "    BNP 2,883 (H) 04/04/2023      Lab Results   Component Value Date    NTBNP 9,840 (H) 06/02/2022        Thank you for the opportunity to participate in the care of this patient.    Yue Aparicio PA-C

## 2024-02-26 ENCOUNTER — OFFICE VISIT (OUTPATIENT)
Dept: CARDIOLOGY CLINIC | Facility: CLINIC | Age: 64
End: 2024-02-26
Payer: MEDICARE

## 2024-02-26 VITALS
BODY MASS INDEX: 25.12 KG/M2 | SYSTOLIC BLOOD PRESSURE: 170 MMHG | DIASTOLIC BLOOD PRESSURE: 100 MMHG | OXYGEN SATURATION: 97 % | WEIGHT: 136.5 LBS | HEART RATE: 85 BPM | HEIGHT: 62 IN

## 2024-02-26 DIAGNOSIS — Q21.12 PFO (PATENT FORAMEN OVALE): ICD-10-CM

## 2024-02-26 DIAGNOSIS — I63.9 CVA (CEREBRAL VASCULAR ACCIDENT) (HCC): ICD-10-CM

## 2024-02-26 DIAGNOSIS — I50.9 CHF (CONGESTIVE HEART FAILURE) (HCC): ICD-10-CM

## 2024-02-26 DIAGNOSIS — R09.89 SUSPECTED STROKE PATIENT LAST KNOWN TO BE WELL MORE THAN 2 HOURS AGO: ICD-10-CM

## 2024-02-26 DIAGNOSIS — Z09 HOSPITAL DISCHARGE FOLLOW-UP: Primary | ICD-10-CM

## 2024-02-26 DIAGNOSIS — I50.22 CHRONIC SYSTOLIC HEART FAILURE (HCC): ICD-10-CM

## 2024-02-26 DIAGNOSIS — I50.23 ACUTE ON CHRONIC SYSTOLIC HEART FAILURE (HCC): ICD-10-CM

## 2024-02-26 PROCEDURE — 99214 OFFICE O/P EST MOD 30 MIN: CPT | Performed by: PHYSICIAN ASSISTANT

## 2024-02-26 RX ORDER — ALPRAZOLAM 0.25 MG/1
0.25 TABLET ORAL ONCE AS NEEDED
Qty: 2 TABLET | Refills: 0 | Status: SHIPPED | OUTPATIENT
Start: 2024-02-26

## 2024-02-26 RX ORDER — LOSARTAN POTASSIUM 25 MG/1
25 TABLET ORAL DAILY
Qty: 30 TABLET | Refills: 3 | Status: SHIPPED | OUTPATIENT
Start: 2024-02-26

## 2024-02-26 NOTE — PATIENT INSTRUCTIONS
Continue your current medications - please take your statin as prescribed, and start taking the losartan again.   Schedule your cardiac MRI.    Please weigh yourself every day (after emptying your bladder) and keep a detailed log of weights.   Contact the Heart Failure program at [phone] if you gain 3+ lbs overnight or 5+ lbs in 5-7 days.  Limit daily sodium/salt intake to 2000 mg daily to prevent fluid retention.  Avoid canned foods, fast food/Chinese food, and processed meats (hot dogs, lunch meat, and sausage etc.). Caution with condiments.  Limit fluid intake to 2000 mL or 2 liters (about 60-65 ounces) daily.  Avoid electrolyte replacement drinks (such as Gatorade, Pedialyte, Propel, Liquid IV, etc.).  Bring complete list of medications and log of daily weights to your follow-up appointment.

## 2024-03-04 DIAGNOSIS — Z72.0 TOBACCO ABUSE: Primary | ICD-10-CM

## 2024-03-05 ENCOUNTER — PATIENT OUTREACH (OUTPATIENT)
Dept: OTHER | Facility: CLINIC | Age: 64
End: 2024-03-05

## 2024-03-06 ENCOUNTER — APPOINTMENT (OUTPATIENT)
Dept: LAB | Facility: HOSPITAL | Age: 64
End: 2024-03-06
Payer: MEDICARE

## 2024-03-06 ENCOUNTER — HOSPITAL ENCOUNTER (OUTPATIENT)
Dept: NON INVASIVE DIAGNOSTICS | Facility: HOSPITAL | Age: 64
Discharge: HOME | End: 2024-03-06
Payer: MEDICARE

## 2024-03-06 DIAGNOSIS — I50.23 ACUTE ON CHRONIC SYSTOLIC HEART FAILURE (HCC): ICD-10-CM

## 2024-03-06 DIAGNOSIS — D53.9 NUTRITIONAL ANEMIA: ICD-10-CM

## 2024-03-06 DIAGNOSIS — R09.89 SUSPECTED STROKE PATIENT LAST KNOWN TO BE WELL MORE THAN 2 HOURS AGO: ICD-10-CM

## 2024-03-06 DIAGNOSIS — D75.839 THROMBOCYTOSIS: ICD-10-CM

## 2024-03-06 DIAGNOSIS — D72.829 LEUKOCYTOSIS, UNSPECIFIED TYPE: ICD-10-CM

## 2024-03-06 DIAGNOSIS — I42.8 NON-ISCHEMIC CARDIOMYOPATHY (HCC): ICD-10-CM

## 2024-03-06 DIAGNOSIS — R76.8 ELEVATED SERUM IMMUNOGLOBULIN FREE LIGHT CHAIN LEVEL: ICD-10-CM

## 2024-03-06 PROCEDURE — 83521 IG LIGHT CHAINS FREE EACH: CPT

## 2024-03-08 LAB
KAPPA LC UR-MCNC: 1036.83 MG/L (ref 1.17–86.46)
KAPPA LC/LAMBDA UR: 7.14 {RATIO} (ref 1.83–14.26)
LAMBDA LC UR-MCNC: 145.25 MG/L (ref 0.27–15.21)

## 2024-03-14 ENCOUNTER — TELEPHONE (OUTPATIENT)
Dept: HEMATOLOGY ONCOLOGY | Facility: CLINIC | Age: 64
End: 2024-03-14

## 2024-03-14 ENCOUNTER — OFFICE VISIT (OUTPATIENT)
Dept: NEUROLOGY | Facility: CLINIC | Age: 64
End: 2024-03-14
Payer: MEDICARE

## 2024-03-14 ENCOUNTER — PATIENT MESSAGE (OUTPATIENT)
Dept: HEMATOLOGY ONCOLOGY | Facility: CLINIC | Age: 64
End: 2024-03-14

## 2024-03-14 VITALS
OXYGEN SATURATION: 96 % | TEMPERATURE: 98.1 F | SYSTOLIC BLOOD PRESSURE: 104 MMHG | BODY MASS INDEX: 24.07 KG/M2 | DIASTOLIC BLOOD PRESSURE: 68 MMHG | WEIGHT: 131.6 LBS | HEART RATE: 98 BPM

## 2024-03-14 DIAGNOSIS — F17.200 SMOKING: Primary | ICD-10-CM

## 2024-03-14 DIAGNOSIS — I63.9 CVA (CEREBRAL VASCULAR ACCIDENT) (HCC): ICD-10-CM

## 2024-03-14 DIAGNOSIS — R29.898 WEAKNESS OF LEFT HAND: ICD-10-CM

## 2024-03-14 DIAGNOSIS — R09.89 SUSPECTED STROKE PATIENT LAST KNOWN TO BE WELL MORE THAN 2 HOURS AGO: ICD-10-CM

## 2024-03-14 PROBLEM — IMO0001 SMOKING: Status: ACTIVE | Noted: 2022-06-03

## 2024-03-14 PROCEDURE — 99215 OFFICE O/P EST HI 40 MIN: CPT | Performed by: PSYCHIATRY & NEUROLOGY

## 2024-03-14 NOTE — PROGRESS NOTES
Patient ID: Ellie Lazo is a 63 y.o. female.    Assessment/Plan:    This is a 64 y/o Female who is here as a hospital follow up for acute/subacute right sided strokes. Patient had 2decho which showed severely reduced EF with 28%. Etiology is most likely cardioembolic. Recommend sending patient to cardiology for further workup.     PLAN:      Diagnoses and all orders for this visit:    Smoking    Weakness of left hand  -     Ambulatory referral to Neurology    Suspected stroke patient last known to be well more than 2 hours ago  -     Ambulatory referral to Neurology    CVA (cerebral vascular accident) (HCC)  -for secondary stroke prevention, recommend continuation of combination of aspirin and atorvastatin, patient finished 21 day course of aspirin/plavix and was advised to discontinue plavix.  -Blood Pressure goal < 130/80, BP is at goal in the office.   -LDL goal <70  -I advised patient to avoid using NSAIDs for headaches or other pain and to stick to tylenol if needed  -Recommend lifestyle modifications such as mediterranean diet & regular exercise regimen atleast 4-5 times a week for 20-30 minutes.   -I educated patient/family regarding medication compliance  -     Ambulatory referral to Neurology       Follow up in 6 months     I would be happy to see the patient sooner if any new questions/concerns arise.  Patient/Guardian was advised to the call the office if they have any questions and concerns in the meantime.     Patient/Guardian does understand that if they have any new stroke like symptoms such as facial droop on one side, weakness/paralysis on either side, speech trouble, numbness on one side, balance issues, any vision changes, extreme dizziness or any new headache, to call 9-1-1 immediately or to proceed to the nearest ER immediately.      I have spent a total time of 40 minutes on 03/14/24 in caring for this patient including Risks and benefits of tx options, Instructions for management,  Patient and family education, Counseling / Coordination of care, Documenting in the medical record, and Reviewing / ordering tests, medicine, procedures  .     Subjective:    HPI    This is a 63 y.o Female who is here as a hospital follow up for acute/subacute right sided strokes on Mri brain. Patient presented with stroke like symptoms on 2/6/24 and patient had extensive workup which involved CT head which was negative. CTA head and neck which was also negative for LVO/stenosis. Patient had Mri brain which showed had right MCA stroke. 2D echo which showed severely reduced EF with 28%. Etiology is most likely cardioembolic.     Patient was recommended to see cardiology. Patient is complaint w/ aspirin and atorvastatin for stroke prevention. Denies any new TIA/CVA like symptoms, and no residual deficits noted.     The following portions of the patient's history were reviewed and updated as appropriate: She  has a past medical history of CHF (congestive heart failure) ([facility]) and Hypertension.  She   Patient Active Problem List    Diagnosis Date Noted    Leukocytosis 02/23/2024    Thrombocytosis 02/23/2024    Nutritional anemia 02/23/2024    Elevated serum immunoglobulin free light chain level 02/23/2024    CKD (chronic kidney disease) stage 3, GFR 30-59 ml/min ([facility]) 02/09/2024    Patent foramen ovale 02/09/2024    CVA (cerebral vascular accident) ([facility]) 02/08/2024    Chronic systolic heart failure ([facility]) 04/04/2023    Tooth infection 06/04/2022    Other emphysema ([facility]) 06/04/2022    Mediastinal lymphadenopathy 06/03/2022    Smoking 06/03/2022    Alcohol ingestion, 1-4 drinks per day 06/03/2022    Chronic 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  Her family history includes COPD in her mother; Diabetes in her  mother; Hyperlipidemia in her mother; Lung cancer in her father; Stroke in her mother.  She  reports that she has been smoking cigarettes. She has never used smokeless tobacco. She reports current alcohol use of about 4.0 standard drinks of alcohol per week. She reports current drug use. Drug: Marijuana.  Current Outpatient Medications   Medication Sig Dispense Refill    aspirin 81 mg chewable tablet Chew 1 tablet (81 mg total) daily 30 tablet 0    atorvastatin (LIPITOR) 40 mg tablet Take 1 tablet (40 mg total) by mouth every evening 30 tablet 0    losartan (COZAAR) 25 mg tablet Take 1 tablet (25 mg total) by mouth daily 30 tablet 3    metoprolol succinate (TOPROL-XL) 50 mg 24 hr tablet Take 1 tablet (50 mg total) by mouth daily 90 tablet 2    nicotine (NICODERM CQ) 7 mg/24hr TD 24 hr patch Place 1 patch on the skin over 24 hours every 24 hours Do not smoke while using these. 28 patch 0    spironolactone (ALDACTONE) 25 mg tablet Take 1 tablet (25 mg total) by mouth daily 90 tablet 3    albuterol (PROVENTIL HFA,VENTOLIN HFA) 90 mcg/act inhaler Inhale 2 puffs every 4 (four) hours as needed for wheezing (Patient not taking: Reported on 2/26/2024) 18 g 0    ALPRAZolam (XANAX) 0.25 mg tablet Take 1 tablet (0.25 mg total) by mouth once as needed for anxiety (for anxiety/claustrophobia (take one, additonal one as needed)) for up to 2 doses Do not drive after taking this medication. (Patient not taking: Reported on 3/14/2024) 2 tablet 0    clopidogrel (PLAVIX) 75 mg tablet Take 1 tablet (75 mg total) by mouth daily for 20 days Do not start before February 11, 2024. (Patient not taking: Reported on 2/23/2024) 20 tablet 0     No current facility-administered medications for this visit.     Current Outpatient Medications on File Prior to Visit   Medication Sig    aspirin 81 mg chewable tablet Chew 1 tablet (81 mg total) daily    atorvastatin (LIPITOR) 40 mg tablet Take 1 tablet (40 mg total) by mouth every evening    losartan  "(COZAAR) 25 mg tablet Take 1 tablet (25 mg total) by mouth daily    metoprolol succinate (TOPROL-XL) 50 mg 24 hr tablet Take 1 tablet (50 mg total) by mouth daily    nicotine (NICODERM CQ) 7 mg/24hr TD 24 hr patch Place 1 patch on the skin over 24 hours every 24 hours Do not smoke while using these.    spironolactone (ALDACTONE) 25 mg tablet Take 1 tablet (25 mg total) by mouth daily    albuterol (PROVENTIL HFA,VENTOLIN HFA) 90 mcg/act inhaler Inhale 2 puffs every 4 (four) hours as needed for wheezing (Patient not taking: Reported on 2/26/2024)    ALPRAZolam (XANAX) 0.25 mg tablet Take 1 tablet (0.25 mg total) by mouth once as needed for anxiety (for anxiety/claustrophobia (take one, additonal one as needed)) for up to 2 doses Do not drive after taking this medication. (Patient not taking: Reported on 3/14/2024)    clopidogrel (PLAVIX) 75 mg tablet Take 1 tablet (75 mg total) by mouth daily for 20 days Do not start before February 11, 2024. (Patient not taking: Reported on 2/23/2024)     No current facility-administered medications on file prior to visit.     She is allergic to codeine, oxycodone-acetaminophen, and propoxyphene..     Objective:    Blood pressure 104/68, pulse 98, temperature 98.1 °F (36.7 °C), temperature source Temporal, weight 59.7 kg (131 lb 9.6 oz), SpO2 96%, currently breastfeeding.    Physical Exam  General - patient is alert   Speech - no dysarthria noted, no aphasia noted.     Neuro:   Cranial nerves: PERRL, EOMI, facial sensation intact to soft touch in V1, V2 and V3, no facial asymmetry noted, uvula/palate midline, tongue midline.   Motor: 5/5 throughout, normal tone, no pronator drift noted.   Sensory - intact to soft touch throughout  Reflexes - 2+ throughout  Coordination - no ataxia/dysmetria noted  Gait - normal      ROS:  Reviewed ROS   Review of Systems   Constitutional:  Positive for appetite change (decrease-\"things don't taste right\"). Negative for fatigue and fever.   HENT: " Negative.  Negative for hearing loss, tinnitus, trouble swallowing and voice change.    Eyes: Negative.  Negative for photophobia, pain and visual disturbance.   Respiratory: Negative.  Negative for shortness of breath.    Cardiovascular: Negative.  Negative for palpitations.   Gastrointestinal: Negative.  Negative for nausea and vomiting.   Endocrine: Negative.  Negative for cold intolerance.   Genitourinary: Negative.  Negative for dysuria, frequency and urgency.   Musculoskeletal:  Negative for back pain, gait problem, myalgias, neck pain and neck stiffness.   Skin: Negative.  Negative for rash.   Allergic/Immunologic: Negative.    Neurological: Negative.  Negative for dizziness, tremors, seizures, syncope, facial asymmetry, speech difficulty, weakness, light-headedness, numbness and headaches.   Hematological: Negative.  Does not bruise/bleed easily.   Psychiatric/Behavioral: Negative.  Negative for confusion, hallucinations and sleep disturbance.

## 2024-03-14 NOTE — TELEPHONE ENCOUNTER
Attempted to call reminding to have labs drawn prior to appt. Unable to leave voicemail due to mailbox being full.

## 2024-03-14 NOTE — TELEPHONE ENCOUNTER
Appointment Change  Cancel, Reschedule, Change to Virtual      Who are you speaking with? Child   If it is not the patient, is the caller listed on the communication consent form? Yes   Which provider is the appointment scheduled with? CARMEN Martínez   When was the original appointment scheduled?    Please list date and time 3/18/24 830   At which location is the appointment scheduled to take place? [place]   Was the appointment rescheduled?     Was the appointment changed from an in person visit to a virtual visit?    If so, please list the details of the change. 4/10/24 1030   What is the reason for the appointment change? labs       Was [facility] transport scheduled? N/A   Does [facility] transport need to be scheduled for the new visit (if applicable) N/A   Does the patient need an infusion appointment rescheduled? N/A   Does the patient have an upcoming infusion appointment scheduled? If so, when? No   Is the patient undergoing chemotherapy? N/A   For appointments cancelled with less than 24 hours:  Was the no-show policy reviewed? N/A

## 2024-03-26 ENCOUNTER — HOSPITAL ENCOUNTER (OUTPATIENT)
Dept: MRI IMAGING | Facility: HOSPITAL | Age: 64
Discharge: HOME | End: 2024-03-26

## 2024-03-26 DIAGNOSIS — I42.8 NON-ISCHEMIC CARDIOMYOPATHY (HCC): ICD-10-CM

## 2024-03-30 DIAGNOSIS — I63.9 CVA (CEREBRAL VASCULAR ACCIDENT) (HCC): ICD-10-CM

## 2024-03-30 DIAGNOSIS — I50.23 ACUTE ON CHRONIC SYSTOLIC HEART FAILURE (HCC): ICD-10-CM

## 2024-04-01 RX ORDER — LOSARTAN POTASSIUM 25 MG/1
25 TABLET ORAL DAILY
Qty: 30 TABLET | Refills: 0 | Status: SHIPPED | OUTPATIENT
Start: 2024-04-01

## 2024-04-02 RX ORDER — ATORVASTATIN CALCIUM 40 MG/1
40 TABLET, FILM COATED ORAL EVERY EVENING
Qty: 30 TABLET | Refills: 3 | Status: SHIPPED | OUTPATIENT
Start: 2024-04-02

## 2024-04-05 ENCOUNTER — CLINICAL SUPPORT (OUTPATIENT)
Dept: CARDIOLOGY CLINIC | Facility: CLINIC | Age: 64
End: 2024-04-05
Payer: MEDICARE

## 2024-04-05 DIAGNOSIS — I63.9 CEREBROVASCULAR ACCIDENT (CVA), UNSPECIFIED MECHANISM (HCC): ICD-10-CM

## 2024-04-05 PROCEDURE — 93248 EXT ECG>7D<15D REV&INTERPJ: CPT

## 2024-04-09 ENCOUNTER — TELEPHONE (OUTPATIENT)
Dept: HEMATOLOGY ONCOLOGY | Facility: CLINIC | Age: 64
End: 2024-04-09

## 2024-04-09 NOTE — TELEPHONE ENCOUNTER
Spoke to pt regarding labs needing to be drawn. Pt verbalized she will not be able to have labs drawn prior to appt and would like to r/s. New appt scheduled for Thursday 5/23 at 10:00 am with CARMEN Nickerson in the [place] office. Pt verbalized understanding.

## 2024-04-29 ENCOUNTER — TELEPHONE (OUTPATIENT)
Dept: CARDIOLOGY CLINIC | Facility: CLINIC | Age: 64
End: 2024-04-29

## 2024-04-29 NOTE — TELEPHONE ENCOUNTER
----- Message from James Navarro DO sent at 4/29/2024 12:34 PM EDT -----  Please call the patient regarding their Heart Monitor results. There were some very minor abnormalities that require routine follow up. Nothing to be concerned about right now. Plan for follow up at next scheduled appointment.. Continue current medications at the same doses..

## 2024-04-29 NOTE — TELEPHONE ENCOUNTER
Phone call to patient.  Spoke with daughter.  Per Dr. Navarro  there were some very minor abnormalities that require routine follow up. Nothing to be concerned about right now.  Continue current medications at the same doses..   Dr. Odell will review results at follow up visit 5/6/24.    Daughter understood instructions.

## 2024-05-01 NOTE — PROGRESS NOTES
"Advanced Heart Failure/Pulmonary Hypertension Outpatient Note - Ellie Sharmar 63 y.o. female MRN: [id number]    @ Encounter: [id number]    Assessment:  63 y.o. female PMH and acute problems listed later in this note (a partial list may also be included within 'assessment' section) p/w HF fu.    Primary cardiac team has been general cardiology, Dr. Navarro > she requests to no longer follow up with that team and wishes to fu HF team at [address] she says  CVA. MRI 02/07/2024: acute/subacute cortical infarcts in R precentral gyrus and parietal lobe, no significant edema, mass effect, hemorrhagic transformation. Chronic left ganglioscapular and corona radiata lacunar infarcts, chronic R basal ganglia lacunar infarct, mild microangiopathic changes.  Per neuro: \"CTA head and neck which was also negative for LVO/stenosis. \"  Neuro concerned for cardioembolic etiology, particularly as LVEf was 28% range at time of CVA.  NICM, HFimpEF, LVEF 28%>now normalized 2024  [facility] 6/6/22: Normal epicardial coronary arteries   Pulmonary hypertension. Group II and possible group III with dilated cardiomyopathy and +/- COPD  Chronic daily alcohol use in past  Extensive history tobacco abuse > cut down 2024  Chronic leukocytosis  Thrombocytosis  CKD  Small PFO on NATHAN 2024      I have reviewed all pertinent patient data including but not limited to:        Lab Units 02/10/24  0511 02/09/24  0653 02/08/24  0751   CREATININE mg/dL 1.26 1.31* 1.30         Lab Results   Component Value Date    K 5.0 02/10/2024     Lab Results   Component Value Date    HGBA1C 5.3 02/08/2024     Lab Results   Component Value Date    LPP1WONUPCOW 5.641 (H) 06/06/2022     Lab Results   Component Value Date    LDLCALC 96 02/08/2024     Lab Results   Component Value Date    BNP 2,883 (H) 04/04/2023      Lab Results   Component Value Date    NTBNP 9,840 (H) 06/02/2022      Spep:  No monoclonal bands noted. I do not see serum ZOIE done in past.  Urine immunofixation " shows no monoclonal gammopathy   FLA ratio 2.15 - wnl given CKD    TODAY'S PLAN:     05/06/24  I am meeting patient for the first time today  Warm, euvolemic  No new cardiac complaints, feels generally well  BP at goal    Pre-existing Infiltrative CM workup underway per her other cardiology providers  CMR ordered but not yet scheduled (pt says she attempted to get it done but a vein could not be found so was planning to re-schedule) - she will re attempt now, also with better hydration  Borderline overall clinical evidence for infiltrative CM at present    screening labs point away from [place]    Zio patch ordered per another provider - Acceptable results    No Rx changes today    Follow up:  With me in 6 weeks, after cardiac MRI   Offered ongoing fu with her pre-existing general cardiology team - Dr. Navarro, however she declines and wishes to continue at [address] for now  In addition to follow up with their other medical providers    Pharmacotherapies / Neurohormonal Blockade:  --Beta Blocker: metoprolol succinate 50 mg daily.  --ARNi / ACEi / ARB: Losartan 25 mg QD. (Entresto stopped due to lightheadedness in the past )  --Aldosterone Antagonist: Spironolactone 25 mg daily  --SGLT2 Inhibitor: did not tolerate Jardiance per chart    --Diuretic: Lasix 20 mg PRN     Advanced Therapies:   --Inotrope:    Studies:  I have reviewed all pertinent patient data/labs/imaging where available, including but not limited to the below studies. Selected results may be displayed here but comprehensive listing is omitted for note clarity and can be found in the epic chart.    ECG.    Echo.    Stress.    Cath.    HPI:   63 y.o. female PMH and acute problems listed later in this note (a partial list may also be included within 'assessment' section) p/w HF fu.  No new CP/SOB/dizziness/palpitations/syncope.  No new fatigue.  No new unintentional weight changes.  No new leg swelling, PND, pillow orthopnea.  No new fevers, chills, cough,  nausea, vomiting, diarrhea, dysuria.      Interval History:   As noted in 'plan' section above and prior epic chart notes.    Past Medical History:   Diagnosis Date    CHF (congestive heart failure) ([facility])     Hypertension      Patient Active Problem List    Diagnosis Date Noted    Leukocytosis 02/23/2024    Thrombocytosis 02/23/2024    Nutritional anemia 02/23/2024    Elevated serum immunoglobulin free light chain level 02/23/2024    CKD (chronic kidney disease) stage 3, GFR 30-59 ml/min ([facility]) 02/09/2024    Patent foramen ovale 02/09/2024    CVA (cerebral vascular accident) ([facility]) 02/08/2024    Chronic systolic heart failure ([facility]) 04/04/2023    Tooth infection 06/04/2022    Other emphysema ([facility]) 06/04/2022    Mediastinal lymphadenopathy 06/03/2022    Smoking 06/03/2022    Alcohol ingestion, 1-4 drinks per day 06/03/2022    Chronic hip pain after total replacement of right hip joint 09/29/2020       ROS:  10 point ROS negative except as specified in HPI/interval history    Allergies   Allergen Reactions    Codeine GI Intolerance and Vomiting    Oxycodone-Acetaminophen GI Intolerance and Vomiting    Propoxyphene Vomiting     Current Outpatient Medications   Medication Instructions    albuterol (PROVENTIL HFA,VENTOLIN HFA) 90 mcg/act inhaler 2 puffs, Inhalation, Every 4 hours PRN    ALPRAZolam (XANAX) 0.25 mg, Oral, Once as needed, Do not drive after taking this medication.    aspirin 81 mg, Oral, Daily    atorvastatin (LIPITOR) 40 mg, Oral, Every evening    clopidogrel (PLAVIX) 75 mg, Oral, Daily    losartan (COZAAR) 25 mg, Oral, Daily    metoprolol succinate (TOPROL-XL) 50 mg, Oral, Daily    nicotine (NICODERM CQ) 7 mg/24hr TD 24 hr patch 1 patch, Transdermal, Every 24 hours, Do not smoke while using these.    spironolactone (ALDACTONE) 25 mg, Oral, Daily      Social History     Socioeconomic History    Marital status: /Civil Union     Spouse name: Not on file    Number of children: Not on file    Years of  "education: Not on file    Highest education level: Not on file   Occupational History    Not on file   Tobacco Use    Smoking status: Some Days     Current packs/day: 0.50     Types: Cigarettes    Smokeless tobacco: Never   Vaping Use    Vaping status: Never Used   Substance and Sexual Activity    Alcohol use: Yes     Alcohol/week: 4.0 standard drinks of alcohol     Types: 4 Shots of liquor per week    Drug use: Yes     Types: Marijuana    Sexual activity: Yes     Partners: Male   Other Topics Concern    Not on file   Social History Narrative    Not on file     Social Determinants of Health     Financial Resource Strain: Not on file   Food Insecurity: No Food Insecurity (2/9/2024)    Hunger Vital Sign     Worried About Running Out of Food in the Last Year: Never true     Ran Out of Food in the Last Year: Never true   Transportation Needs: No Transportation Needs (2/9/2024)    PRAPARE - Transportation     Lack of Transportation (Medical): No     Lack of Transportation (Non-Medical): No   Physical Activity: Not on file   Stress: Not on file   Social Connections: Not on file   Intimate Partner Violence: Not on file   Housing Stability: Low Risk  (2/9/2024)    Housing Stability Vital Sign     Unable to Pay for Housing in the Last Year: No     Number of Places Lived in the Last Year: 1     Unstable Housing in the Last Year: No     Family History   Problem Relation Age of Onset    Diabetes Mother     COPD Mother     Hyperlipidemia Mother     Stroke Mother     Lung cancer Father        Physical Exam:  Vitals:    05/06/24 0935   BP: 124/82   BP Location: Left arm   Patient Position: Sitting   Cuff Size: Standard   Pulse: 68   SpO2: 99%   Weight: 61.1 kg (134 lb 11.2 oz)   Height: 5' 2\" (1.575 m)     Constitutional: NAD, non toxic  Ears/nose/mouth/throat: atraumatic  CV: RRR, nl S1S2, no murmurs/rubs/gallups, no JVD, no HJR  Resp: CTABL  GI: Soft, NTND  MSK: no swollen joints in exposed areas  Extr: No edema, warm " LE  Pysche: Normal affect  Neuro: appropriate in conversation  Skin: dry and intact in exposed areas    Labs & Results:  Lab Results   Component Value Date    SODIUM 137 02/10/2024    K 5.0 02/10/2024     (H) 02/10/2024    CO2 20 (L) 02/10/2024    BUN 21 02/10/2024    CREATININE 1.26 02/10/2024    GLUC 90 02/10/2024    CALCIUM 9.0 02/10/2024     Lab Results   Component Value Date    WBC 14.17 (H) 02/10/2024    HGB 14.3 02/10/2024    HCT 42.5 02/10/2024     (H) 02/10/2024     (H) 02/10/2024       Counseling / Coordination of Care  Time spent today 27 minutes.  Greater than 50% of total time was spent with the patient and / or family counseling and / or coordination of care.  We discussed diagnoses, most recent studies, tests and any changes in treatment plan.    Thank you for the opportunity to participate in the care of this patient.    Rodríguez Odell MD  Attending Physician  Advanced Heart Failure and Transplant Cardiology  [facility]

## 2024-05-06 ENCOUNTER — OFFICE VISIT (OUTPATIENT)
Dept: CARDIOLOGY CLINIC | Facility: CLINIC | Age: 64
End: 2024-05-06
Payer: MEDICARE

## 2024-05-06 VITALS
HEART RATE: 68 BPM | SYSTOLIC BLOOD PRESSURE: 124 MMHG | WEIGHT: 134.7 LBS | BODY MASS INDEX: 24.79 KG/M2 | DIASTOLIC BLOOD PRESSURE: 82 MMHG | OXYGEN SATURATION: 99 % | HEIGHT: 62 IN

## 2024-05-06 DIAGNOSIS — Q21.12 PFO (PATENT FORAMEN OVALE): ICD-10-CM

## 2024-05-06 DIAGNOSIS — I42.8 NON-ISCHEMIC CARDIOMYOPATHY (HCC): ICD-10-CM

## 2024-05-06 DIAGNOSIS — I50.9 CONGESTIVE HEART FAILURE, UNSPECIFIED HF CHRONICITY, UNSPECIFIED HEART FAILURE TYPE (HCC): Primary | ICD-10-CM

## 2024-05-06 DIAGNOSIS — I63.9 CEREBROVASCULAR ACCIDENT (CVA), UNSPECIFIED MECHANISM (HCC): ICD-10-CM

## 2024-05-06 PROCEDURE — 99214 OFFICE O/P EST MOD 30 MIN: CPT | Performed by: STUDENT IN AN ORGANIZED HEALTH CARE EDUCATION/TRAINING PROGRAM

## 2024-05-15 ENCOUNTER — PATIENT MESSAGE (OUTPATIENT)
Dept: HEMATOLOGY ONCOLOGY | Facility: CLINIC | Age: 64
End: 2024-05-15

## 2024-05-15 ENCOUNTER — TELEPHONE (OUTPATIENT)
Dept: HEMATOLOGY ONCOLOGY | Facility: CLINIC | Age: 64
End: 2024-05-15

## 2024-05-15 NOTE — TELEPHONE ENCOUNTER
Attempted to lvm to pt reminding to have labs drawn but was unable to due to voice box being full.

## 2024-05-21 ENCOUNTER — TELEPHONE (OUTPATIENT)
Dept: HEMATOLOGY ONCOLOGY | Facility: CLINIC | Age: 64
End: 2024-05-21

## 2024-05-21 NOTE — TELEPHONE ENCOUNTER
Appointment Confirmation   Who are you speaking with? Child   If it is not the patient, are they listed on an active communication consent form? Yes   Which provider is the appointment scheduled with?  CARMEN Martínez   When is the appointment scheduled?  Please list date and time 5/23/24   At which location is the appointment scheduled to take place? [place]   Did caller verbalize understanding of appointment details? Yes

## 2024-05-22 ENCOUNTER — TELEPHONE (OUTPATIENT)
Dept: HEMATOLOGY ONCOLOGY | Facility: CLINIC | Age: 64
End: 2024-05-22

## 2024-05-22 NOTE — TELEPHONE ENCOUNTER
Spoke with patient asking if she had labs completed priorat an outside facility. Patient stated she believes she has gout and she is unable to walk. Patient requested appt to be r/s. New appt is 6/19/24 at 10:30 am with Denia MORALES. Patient agreed and voiced understanding.

## 2024-06-09 PROCEDURE — 99284 EMERGENCY DEPT VISIT MOD MDM: CPT

## 2024-06-10 ENCOUNTER — APPOINTMENT (EMERGENCY)
Dept: CT IMAGING | Facility: HOSPITAL | Age: 64
End: 2024-06-10
Payer: MEDICARE

## 2024-06-10 ENCOUNTER — APPOINTMENT (EMERGENCY)
Dept: RADIOLOGY | Facility: HOSPITAL | Age: 64
End: 2024-06-10
Payer: MEDICARE

## 2024-06-10 ENCOUNTER — HOSPITAL ENCOUNTER (EMERGENCY)
Facility: HOSPITAL | Age: 64
Discharge: HOME | End: 2024-06-10
Attending: EMERGENCY MEDICINE
Payer: MEDICARE

## 2024-06-10 VITALS
DIASTOLIC BLOOD PRESSURE: 66 MMHG | TEMPERATURE: 97.8 F | RESPIRATION RATE: 18 BRPM | SYSTOLIC BLOOD PRESSURE: 135 MMHG | HEART RATE: 64 BPM | OXYGEN SATURATION: 96 %

## 2024-06-10 DIAGNOSIS — S82.141A TIBIAL PLATEAU FRACTURE, RIGHT, CLOSED, INITIAL ENCOUNTER: Primary | ICD-10-CM

## 2024-06-10 PROCEDURE — 99284 EMERGENCY DEPT VISIT MOD MDM: CPT | Performed by: EMERGENCY MEDICINE

## 2024-06-10 PROCEDURE — 73700 CT LOWER EXTREMITY W/O DYE: CPT

## 2024-06-10 PROCEDURE — 73564 X-RAY EXAM KNEE 4 OR MORE: CPT

## 2024-06-10 PROCEDURE — 73502 X-RAY EXAM HIP UNI 2-3 VIEWS: CPT

## 2024-06-10 RX ORDER — ACETAMINOPHEN 325 MG/1
975 TABLET ORAL ONCE
Status: COMPLETED | OUTPATIENT
Start: 2024-06-10 | End: 2024-06-10

## 2024-06-10 RX ORDER — OXYCODONE HYDROCHLORIDE 5 MG/1
5 TABLET ORAL ONCE
Status: COMPLETED | OUTPATIENT
Start: 2024-06-10 | End: 2024-06-10

## 2024-06-10 RX ORDER — ONDANSETRON 4 MG/1
4 TABLET, ORALLY DISINTEGRATING ORAL ONCE
Status: COMPLETED | OUTPATIENT
Start: 2024-06-10 | End: 2024-06-10

## 2024-06-10 RX ADMIN — ACETAMINOPHEN 975 MG: 325 TABLET, FILM COATED ORAL at 01:45

## 2024-06-10 RX ADMIN — ONDANSETRON 4 MG: 4 TABLET, ORALLY DISINTEGRATING ORAL at 02:04

## 2024-06-10 RX ADMIN — OXYCODONE 5 MG: 5 TABLET ORAL at 02:03

## 2024-06-10 NOTE — DISCHARGE INSTRUCTIONS
You have a tibial plateau fracture.  You should not bear weight at this time.  I will also be giving you a knee immobilizer which you should keep on as much as possible.  I have also given you referral to orthopedic surgery.  Please schedule an appointment with them as soon as possible.  You can use Tylenol as needed for pain.  If you are having severe pain or significantly worsening swelling or if you have numbness or weakness in your leg, you should remove the knee immobilizer and come to the emergency room.
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
## 2024-06-12 ENCOUNTER — TELEPHONE (OUTPATIENT)
Age: 64
End: 2024-06-12

## 2024-06-12 DIAGNOSIS — I50.22 CHRONIC SYSTOLIC CONGESTIVE HEART FAILURE (HCC): ICD-10-CM

## 2024-06-12 DIAGNOSIS — I42.8 NON-ISCHEMIC CARDIOMYOPATHY (HCC): ICD-10-CM

## 2024-06-12 RX ORDER — SPIRONOLACTONE 25 MG/1
25 TABLET ORAL DAILY
Qty: 90 TABLET | Refills: 3 | OUTPATIENT
Start: 2024-06-12

## 2024-06-12 NOTE — TELEPHONE ENCOUNTER
Hello,  Please advise if the following patient can be forced onto the schedule:    Patient: Agnes Lazo    : 60    MRN: [id number]    Call back #: [phone]    Insurance: [facility]    Reason for appointment: Nondisplaced tibial plateau fracture as detailed above. Mild to moderate sized knee hemarthrosis.     Requested doctor and/or location: [place] or [place]      Thank you.

## 2024-06-12 NOTE — TELEPHONE ENCOUNTER
Pt contacted Call Center requested refill of their medication.        Doctor Name: Cass      Medication Name: spironolactone      Dosage of Med: 25mg       Frequency of Med: 1 tablet by mouth daily      Remaining Medication: Completely out      Pharmacy and Location: [facility]        Pt. Preferred Callback Phone Number: [phone]      Thank you.

## 2024-06-12 NOTE — TELEPHONE ENCOUNTER
Pt was just at pharmacy and the RX has not yet been sent and she is concerned it won't get filled today.

## 2024-06-13 ENCOUNTER — OFFICE VISIT (OUTPATIENT)
Dept: OBGYN CLINIC | Facility: CLINIC | Age: 64
End: 2024-06-13
Payer: MEDICARE

## 2024-06-13 VITALS
BODY MASS INDEX: 24.66 KG/M2 | DIASTOLIC BLOOD PRESSURE: 92 MMHG | WEIGHT: 134 LBS | SYSTOLIC BLOOD PRESSURE: 148 MMHG | HEIGHT: 62 IN

## 2024-06-13 DIAGNOSIS — S82.141A TIBIAL PLATEAU FRACTURE, RIGHT, CLOSED, INITIAL ENCOUNTER: Primary | ICD-10-CM

## 2024-06-13 PROCEDURE — 99203 OFFICE O/P NEW LOW 30 MIN: CPT | Performed by: PHYSICIAN ASSISTANT

## 2024-06-13 RX ORDER — SPIRONOLACTONE 25 MG/1
25 TABLET ORAL DAILY
Qty: 90 TABLET | Refills: 0 | Status: SHIPPED | OUTPATIENT
Start: 2024-06-13

## 2024-06-13 NOTE — PROGRESS NOTES
"Patient Name:  Ellie Lazo  MRN:  [id number]    Assessment & Plan     Right knee nondisplaced lateral tibial plateau fracture after mechanical fall 6/10/2024.  Currently fracture alignment remains amenable to conservative management.  Continue nonweightbearing right lower extremity with crutches to ambulate.  TROM hinged knee brace provided today locked in extension.  Continue Tylenol as needed for pain.  Follow-up in 10 to 14 days with one of the orthopedic surgeons at the [facility] for further evaluation to ensure no interval displacement of the fracture has occurred.    Chief Complaint     Right knee pain.    History of the Present Illness     Ellie Lazo is a 63 y.o. female who reports to the office today for evaluation of her right knee.  Patient sustained a mechanical fall on 6/10/2024.  She states she was gardening and tripped over a vine in her yard.  This resulted in a twisting injury to her right knee.  She denies hitting her head or loss of consciousness.  After this occurred she noted significant pain in the right knee rated 8 out of 10 in intensity.  This prompted her to report to the emergency department.  At that time x-rays and a CT scan were performed revealing a lateral tibial plateau fracture.  She was placed in a knee immobilizer.  Currently her pain is improved and only 4 out of 10 in intensity.  Pain is worse with direct pressure and use.  She has been taking Tylenol with improvement.  No numbness or tingling.  No fevers or chills.    Physical Exam     /92 (BP Location: Left arm, Patient Position: Sitting, Cuff Size: Standard)   Ht 5' 2\" (1.575 m)   Wt 60.8 kg (134 lb)   BMI 24.51 kg/m²     Right knee: No gross deformity.  Skin intact.  Soft tissue swelling is evident.  No erythema or ecchymosis.  There is tenderness over the lateral tibial plateau.  No joint line tenderness.  Range of motion and strength testing is deferred.  Stable to varus and valgus stress.  Patient " is able to perform straight leg raise against gravity.  Sensation is intact distally.    Eyes: Anicteric sclerae.  ENT: Trachea midline.  Lungs: Normal respiratory effort.  CV: Capillary refill is less than 2 seconds.  Skin: Intact without erythema.  Lymph: No palpable lymphadenopathy.  Neuro: Sensation is grossly intact to light touch.  Psych: Mood and affect are appropriate.    Data Review     I have personally reviewed pertinent films in PACS, and my interpretation follows:    X-rays right knee 6/10/2024: Nondisplaced lateral tibial plateau fracture.  Orthopedic hardware in the distal femur is intact without signs of complication.    CT scan right knee 6/10/2024: Small nondisplaced fracture involving the posterior cortex of the lateral tibial metaphysis extending into the lateral tibial plateau at the central weightbearing aspect.    Past Medical History:   Diagnosis Date    CHF (congestive heart failure) (HCC)     Hypertension        Past Surgical History:   Procedure Laterality Date    CARDIAC CATHETERIZATION N/A 6/6/2022    Procedure: Cardiac catheterization;  Surgeon: Donta Guardado MD;  Location: [facility];  Service: Cardiology    CERVICAL CONE BIOPSY      FL GUIDED NEEDLE PLAC BX/ASP/INJ  10/21/2020    HIP SURGERY      HIP SURGERY         Allergies   Allergen Reactions    Codeine GI Intolerance and Vomiting    Oxycodone-Acetaminophen GI Intolerance and Vomiting    Propoxyphene Vomiting       Current Outpatient Medications on File Prior to Visit   Medication Sig Dispense Refill    ALPRAZolam (XANAX) 0.25 mg tablet Take 1 tablet (0.25 mg total) by mouth once as needed for anxiety (for anxiety/claustrophobia (take one, additonal one as needed)) for up to 2 doses Do not drive after taking this medication. 2 tablet 0    atorvastatin (LIPITOR) 40 mg tablet Take 1 tablet (40 mg total) by mouth every evening 30 tablet 3    losartan (COZAAR) 25 mg tablet Take 1 tablet (25 mg total) by mouth daily 30 tablet  0    metoprolol succinate (TOPROL-XL) 50 mg 24 hr tablet Take 1 tablet (50 mg total) by mouth daily 90 tablet 2    nicotine (NICODERM CQ) 7 mg/24hr TD 24 hr patch Place 1 patch on the skin over 24 hours every 24 hours Do not smoke while using these. 28 patch 0    spironolactone (ALDACTONE) 25 mg tablet Take 1 tablet (25 mg total) by mouth daily 90 tablet 3    albuterol (PROVENTIL HFA,VENTOLIN HFA) 90 mcg/act inhaler Inhale 2 puffs every 4 (four) hours as needed for wheezing (Patient not taking: Reported on 2/26/2024) 18 g 0    aspirin 81 mg chewable tablet Chew 1 tablet (81 mg total) daily 30 tablet 0    clopidogrel (PLAVIX) 75 mg tablet Take 1 tablet (75 mg total) by mouth daily for 20 days Do not start before February 11, 2024. (Patient not taking: Reported on 2/23/2024) 20 tablet 0     No current facility-administered medications on file prior to visit.       Social History     Tobacco Use    Smoking status: Some Days     Current packs/day: 0.50     Types: Cigarettes    Smokeless tobacco: Never   Vaping Use    Vaping status: Never Used   Substance Use Topics    Alcohol use: Yes     Alcohol/week: 4.0 standard drinks of alcohol     Types: 4 Shots of liquor per week    Drug use: Yes     Types: Marijuana       Family History   Problem Relation Age of Onset    Diabetes Mother     COPD Mother     Hyperlipidemia Mother     Stroke Mother     Lung cancer Father        Review of Systems     As stated in the HPI. All other systems reviewed and are negative.

## 2024-06-14 ENCOUNTER — TELEPHONE (OUTPATIENT)
Dept: HEMATOLOGY ONCOLOGY | Facility: CLINIC | Age: 64
End: 2024-06-14

## 2024-06-14 NOTE — TELEPHONE ENCOUNTER
Lvm to pt reminding to have labs completed prior to appt. Provided hopeline phone number in case of any questions/concerns.

## 2024-06-18 ENCOUNTER — TELEPHONE (OUTPATIENT)
Dept: HEMATOLOGY ONCOLOGY | Facility: CLINIC | Age: 64
End: 2024-06-18

## 2024-06-19 ENCOUNTER — TELEPHONE (OUTPATIENT)
Dept: HEMATOLOGY ONCOLOGY | Facility: CLINIC | Age: 64
End: 2024-06-19

## 2024-06-19 NOTE — TELEPHONE ENCOUNTER
Lvm to pt informing that appt needs to be r/s due to labs not being drawn. Apt r/s for 6/25 at 3:40 a.m with Denia in the [place] office. Provided hopeline phone number in case of any questions/concerns.

## 2024-06-21 ENCOUNTER — APPOINTMENT (OUTPATIENT)
Dept: RADIOLOGY | Facility: MEDICAL CENTER | Age: 64
End: 2024-06-21
Payer: MEDICARE

## 2024-06-21 ENCOUNTER — OFFICE VISIT (OUTPATIENT)
Dept: OBGYN CLINIC | Facility: MEDICAL CENTER | Age: 64
End: 2024-06-21
Payer: MEDICARE

## 2024-06-21 VITALS
SYSTOLIC BLOOD PRESSURE: 153 MMHG | HEIGHT: 62 IN | BODY MASS INDEX: 24.51 KG/M2 | DIASTOLIC BLOOD PRESSURE: 102 MMHG | HEART RATE: 77 BPM

## 2024-06-21 DIAGNOSIS — S82.141A TIBIAL PLATEAU FRACTURE, RIGHT, CLOSED, INITIAL ENCOUNTER: ICD-10-CM

## 2024-06-21 DIAGNOSIS — S82.121A CLOSED FRACTURE OF LATERAL PORTION OF RIGHT TIBIAL PLATEAU, INITIAL ENCOUNTER: Primary | ICD-10-CM

## 2024-06-21 PROCEDURE — 73564 X-RAY EXAM KNEE 4 OR MORE: CPT

## 2024-06-21 PROCEDURE — 99214 OFFICE O/P EST MOD 30 MIN: CPT | Performed by: ORTHOPAEDIC SURGERY

## 2024-06-21 NOTE — PROGRESS NOTES
"Orthopaedic Surgery - Office Note  Ellie Lazo (63 y.o. female)   : 1960   MRN: [id number]  Encounter Date: 2024    Assessment / Plan    Right lateral tibial plateau fracture    Remain NWB  Patient may work on ROM   May discontinue T-ROM  Patient counseled on proper nutrition   Follow-up:  No follow-ups on file.      Chief Complaint / Date of Onset  Right knee pain 6/10/24  Injury Mechanism / Date  Fall 6/10/24  Surgery / Date  None    History of Present Illness   Ellie Lazo is a 63 y.o. female who presents for today for initial evaluation of right knee pain.  Patient reports a trip and fall while gardening 6/10/24.  She was seen in the ED on 6/10/24 where x-rays showed a fracture.  later followed up with Davon Apodaca PA-C who placed the patient in a T-ROM.  Patient has been NWB in a T-ROM>.    Treatment Summary  Medications / Modalities  Tylenol  Bracing / Immobilization  T-ROM  Physical Therapy  None  Injections  None  Prior Surgeries  Revision Right RENU with ORIF 21 at [facility] by Dr. Gaspar  Other Treatments  None    Employment / Current Status  N/A    Sport / Organization / Current Status  N/A      Review of Systems  Pertinent items are noted in HPI.  All other systems were reviewed and are negative.      Physical Exam  BP (!) 153/102   Pulse 77   Ht 5' 2\" (1.575 m)   BMI 24.51 kg/m²   Cons: Appears well.  No apparent distress.  Psych: Alert. Oriented x3.  Mood and affect normal.  Eyes: PERRLA, EOMI  Resp: Normal effort.  No audible wheezing or stridor.  CV: Palpable pulse.  No discernable arrhythmia.  No LE edema.  Lymph:  No palpable cervical, axillary, or inguinal lymphadenopathy.  Skin: Warm.  No palpable masses.  No visible lesions.  Neuro: Normal muscle tone.  Normal and symmetric DTR's.     Right Knee Exam  Alignment:  Normal knee alignment.  Inspection:   mild swelling. No edema. No erythema. No ecchymosis.  Palpation:   lateral tibial plateau tenderness.  ROM:  Knee " Extension 0. Knee Flexion 95.  Strength:  Not tested.  Stability:  Not tested.  Tests:  No pertinent positive or negative tests.  Patella:  Not tested.  Neurovascular:  Sensation intact in DP/SP/Moy/Sa/T nerve distributions.  2+ DP & PT pulses.  Gait:  Not tested.       Studies Reviewed  XR of right knee - lateral tibial plateau fracture remains in acceptable alignment and position.       Procedures  No procedures today.    Medical, Surgical, Family, and Social History  The patient's medical history, family history, and social history, were reviewed and updated as appropriate.    Past Medical History:   Diagnosis Date    CHF (congestive heart failure) (HCC)     Hypertension        Past Surgical History:   Procedure Laterality Date    CARDIAC CATHETERIZATION N/A 6/6/2022    Procedure: Cardiac catheterization;  Surgeon: Donta Guardado MD;  Location: [facility];  Service: Cardiology    CERVICAL CONE BIOPSY      FL GUIDED NEEDLE PLAC BX/ASP/INJ  10/21/2020    HIP SURGERY      HIP SURGERY         Family History   Problem Relation Age of Onset    Diabetes Mother     COPD Mother     Hyperlipidemia Mother     Stroke Mother     Lung cancer Father        Social History     Occupational History    Not on file   Tobacco Use    Smoking status: Some Days     Current packs/day: 0.50     Types: Cigarettes    Smokeless tobacco: Never   Vaping Use    Vaping status: Never Used   Substance and Sexual Activity    Alcohol use: Yes     Alcohol/week: 4.0 standard drinks of alcohol     Types: 4 Shots of liquor per week    Drug use: Yes     Types: Marijuana    Sexual activity: Yes     Partners: Male       Allergies   Allergen Reactions    Codeine GI Intolerance and Vomiting    Oxycodone-Acetaminophen GI Intolerance and Vomiting    Propoxyphene Vomiting         Current Outpatient Medications:     ALPRAZolam (XANAX) 0.25 mg tablet, Take 1 tablet (0.25 mg total) by mouth once as needed for anxiety (for anxiety/claustrophobia (take one,  additonal one as needed)) for up to 2 doses Do not drive after taking this medication., Disp: 2 tablet, Rfl: 0    atorvastatin (LIPITOR) 40 mg tablet, Take 1 tablet (40 mg total) by mouth every evening, Disp: 30 tablet, Rfl: 3    losartan (COZAAR) 25 mg tablet, Take 1 tablet (25 mg total) by mouth daily, Disp: 30 tablet, Rfl: 0    metoprolol succinate (TOPROL-XL) 50 mg 24 hr tablet, Take 1 tablet (50 mg total) by mouth daily, Disp: 90 tablet, Rfl: 2    nicotine (NICODERM CQ) 7 mg/24hr TD 24 hr patch, Place 1 patch on the skin over 24 hours every 24 hours Do not smoke while using these., Disp: 28 patch, Rfl: 0    spironolactone (ALDACTONE) 25 mg tablet, Take 1 tablet (25 mg total) by mouth daily, Disp: 90 tablet, Rfl: 0    albuterol (PROVENTIL HFA,VENTOLIN HFA) 90 mcg/act inhaler, Inhale 2 puffs every 4 (four) hours as needed for wheezing (Patient not taking: Reported on 2/26/2024), Disp: 18 g, Rfl: 0    aspirin 81 mg chewable tablet, Chew 1 tablet (81 mg total) daily, Disp: 30 tablet, Rfl: 0    clopidogrel (PLAVIX) 75 mg tablet, Take 1 tablet (75 mg total) by mouth daily for 20 days Do not start before February 11, 2024. (Patient not taking: Reported on 2/23/2024), Disp: 20 tablet, Rfl: 0      Angelita Cooper    Scribe Attestation      I,:  Angelita Cooper am acting as a scribe while in the presence of the attending physician.:       I,:  Rosalio Delvalle MD personally performed the services described in this documentation    as scribed in my presence.:

## 2024-06-25 ENCOUNTER — TELEPHONE (OUTPATIENT)
Dept: HEMATOLOGY ONCOLOGY | Facility: CLINIC | Age: 64
End: 2024-06-25

## 2024-06-25 DIAGNOSIS — D72.829 LEUKOCYTOSIS, UNSPECIFIED TYPE: Primary | ICD-10-CM

## 2024-06-25 DIAGNOSIS — I42.8 NON-ISCHEMIC CARDIOMYOPATHY (HCC): ICD-10-CM

## 2024-06-25 DIAGNOSIS — R76.8 ELEVATED SERUM IMMUNOGLOBULIN FREE LIGHT CHAIN LEVEL: ICD-10-CM

## 2024-06-25 DIAGNOSIS — D53.9 NUTRITIONAL ANEMIA: ICD-10-CM

## 2024-06-25 DIAGNOSIS — D75.839 THROMBOCYTOSIS: ICD-10-CM

## 2024-06-25 NOTE — TELEPHONE ENCOUNTER
Spoke with patient's daughter regarding appointment today, patient's daughter was unaware that patient had appointment today, patient had recently broken her leg and was unable to come to the appointment anyway, and was unable to have her labs completed, rescheduled appointment and will send lab scripts out to mailing address. Daughter verbalized understanding and thanked me for the call.

## 2024-06-27 DIAGNOSIS — I50.9 CHF (CONGESTIVE HEART FAILURE) (HCC): ICD-10-CM

## 2024-06-27 RX ORDER — METOPROLOL SUCCINATE 50 MG/1
50 TABLET, EXTENDED RELEASE ORAL DAILY
Qty: 90 TABLET | Refills: 1 | Status: SHIPPED | OUTPATIENT
Start: 2024-06-27

## 2024-08-02 ENCOUNTER — APPOINTMENT (OUTPATIENT)
Dept: RADIOLOGY | Facility: MEDICAL CENTER | Age: 64
End: 2024-08-02
Payer: MEDICARE

## 2024-08-02 ENCOUNTER — OFFICE VISIT (OUTPATIENT)
Dept: OBGYN CLINIC | Facility: MEDICAL CENTER | Age: 64
End: 2024-08-02
Payer: MEDICARE

## 2024-08-02 VITALS
BODY MASS INDEX: 24.51 KG/M2 | HEART RATE: 91 BPM | SYSTOLIC BLOOD PRESSURE: 174 MMHG | HEIGHT: 62 IN | DIASTOLIC BLOOD PRESSURE: 115 MMHG

## 2024-08-02 DIAGNOSIS — S82.121A CLOSED FRACTURE OF LATERAL PORTION OF RIGHT TIBIAL PLATEAU, INITIAL ENCOUNTER: Primary | ICD-10-CM

## 2024-08-02 DIAGNOSIS — S82.121A CLOSED FRACTURE OF LATERAL PORTION OF RIGHT TIBIAL PLATEAU, INITIAL ENCOUNTER: ICD-10-CM

## 2024-08-02 PROCEDURE — 99213 OFFICE O/P EST LOW 20 MIN: CPT | Performed by: ORTHOPAEDIC SURGERY

## 2024-08-02 PROCEDURE — 73564 X-RAY EXAM KNEE 4 OR MORE: CPT

## 2024-08-02 NOTE — PROGRESS NOTES
"Orthopaedic Surgery - Office Note  Ellie Lazo (63 y.o. female)   : 1960   MRN: [id number]  Encounter Date: 2024    Assessment / Plan  Right lateral tibial plateau fracture    She can wean off crutches as tolerated  Patient declined formal PT  She can swim as tolerated  Continue to work on motion   Ordered and reviewed XR during the visit   Ice, heat and anti-inflammatories prn   Follow-up:  Return in about 2 months (around 10/2/2024) for follow up with Dr. Delvalle.      Chief Complaint / Date of Onset  Right knee pain 6/10/24  Injury Mechanism / Date  Fall 6/10/24 while gardening   Surgery / Date  None       History of Present Illness   Ellie Lazo is a 63 y.o. female who presents for 7 week follow up of the above listed injury. She d/c the TROM at her last visit and is ambulating with crutches. She states her pain is minimal. She has been working on ROM on her own which is going well. She feels that she is improving. She offers no complaints today.    Treatment Summary  Medications / Modalities  Tylenol  Bracing / Immobilization  Crutches   Physical Therapy  None  Injections  None  Prior Surgeries  Revision Right RENU with ORIF 21 at [facility] by Dr. Gaspar  Other Treatments  None     Employment / Current Status  N/A     Sport / Organization / Current Status  N/A      Review of Systems  Pertinent items are noted in HPI.  All other systems were reviewed and are negative.      Physical Exam  BP (!) 174/115   Pulse 91   Ht 5' 2\" (1.575 m)   BMI 24.51 kg/m²   Cons: Appears well.  No apparent distress.  Psych: Alert. Oriented x3.  Mood and affect normal.  Eyes: PERRLA, EOMI  Resp: Normal effort.  No audible wheezing or stridor.  CV: Palpable pulse.  No discernable arrhythmia.  No LE edema.  Lymph:  No palpable cervical, axillary, or inguinal lymphadenopathy.  Skin: Warm.  No palpable masses.  No visible lesions.  Neuro: Normal muscle tone.  Normal and symmetric DTR's.     Right Knee " Exam  Alignment:  Normal knee alignment.  Inspection:   mild knee swelling. No ecchymosis.  Palpation:  No tenderness. No effusion.  ROM:  Knee Extension 0. Knee Flexion 115.  Strength:  Able to actively extend knee against gravity.  Stability:  Not tested.  Tests:   Not tested  Patella:  Not tested.  Neurovascular:  Sensation intact in DP/SP/Moy/Sa/T nerve distributions.  2+ DP & PT pulses.  Gait:  Not tested.       Studies Reviewed  I have personally reviewed pertinent films in PACS.  XR of right knee - images from 08/02/2024 showing healing over the fracture site      Procedures  No procedures today.    Medical, Surgical, Family, and Social History  The patient's medical history, family history, and social history, were reviewed and updated as appropriate.    Past Medical History:   Diagnosis Date    CHF (congestive heart failure) (HCC)     Hypertension        Past Surgical History:   Procedure Laterality Date    CARDIAC CATHETERIZATION N/A 6/6/2022    Procedure: Cardiac catheterization;  Surgeon: Donta Guardado MD;  Location: [facility];  Service: Cardiology    CERVICAL CONE BIOPSY      FL GUIDED NEEDLE PLAC BX/ASP/INJ  10/21/2020    HIP SURGERY      HIP SURGERY         Family History   Problem Relation Age of Onset    Diabetes Mother     COPD Mother     Hyperlipidemia Mother     Stroke Mother     Lung cancer Father        Social History     Occupational History    Not on file   Tobacco Use    Smoking status: Some Days     Current packs/day: 0.50     Types: Cigarettes    Smokeless tobacco: Never   Vaping Use    Vaping status: Never Used   Substance and Sexual Activity    Alcohol use: Yes     Alcohol/week: 4.0 standard drinks of alcohol     Types: 4 Shots of liquor per week    Drug use: Yes     Types: Marijuana    Sexual activity: Yes     Partners: Male       Allergies   Allergen Reactions    Codeine GI Intolerance and Vomiting    Oxycodone-Acetaminophen GI Intolerance and Vomiting    Propoxyphene  Vomiting         Current Outpatient Medications:     ALPRAZolam (XANAX) 0.25 mg tablet, Take 1 tablet (0.25 mg total) by mouth once as needed for anxiety (for anxiety/claustrophobia (take one, additonal one as needed)) for up to 2 doses Do not drive after taking this medication., Disp: 2 tablet, Rfl: 0    atorvastatin (LIPITOR) 40 mg tablet, Take 1 tablet (40 mg total) by mouth every evening, Disp: 30 tablet, Rfl: 3    losartan (COZAAR) 25 mg tablet, Take 1 tablet (25 mg total) by mouth daily, Disp: 30 tablet, Rfl: 0    metoprolol succinate (TOPROL-XL) 50 mg 24 hr tablet, Take 1 tablet (50 mg total) by mouth daily, Disp: 90 tablet, Rfl: 1    nicotine (NICODERM CQ) 7 mg/24hr TD 24 hr patch, Place 1 patch on the skin over 24 hours every 24 hours Do not smoke while using these., Disp: 28 patch, Rfl: 0    spironolactone (ALDACTONE) 25 mg tablet, Take 1 tablet (25 mg total) by mouth daily, Disp: 90 tablet, Rfl: 0    albuterol (PROVENTIL HFA,VENTOLIN HFA) 90 mcg/act inhaler, Inhale 2 puffs every 4 (four) hours as needed for wheezing (Patient not taking: Reported on 2/26/2024), Disp: 18 g, Rfl: 0    aspirin 81 mg chewable tablet, Chew 1 tablet (81 mg total) daily, Disp: 30 tablet, Rfl: 0    clopidogrel (PLAVIX) 75 mg tablet, Take 1 tablet (75 mg total) by mouth daily for 20 days Do not start before February 11, 2024. (Patient not taking: Reported on 2/23/2024), Disp: 20 tablet, Rfl: 0      Cassidy Cabrera    Scribe Attestation      I,:  Cassidy Cabrera am acting as a scribe while in the presence of the attending physician.:       I,:  Rosalio Delvalle MD personally performed the services described in this documentation    as scribed in my presence.:

## 2024-08-29 ENCOUNTER — TELEPHONE (OUTPATIENT)
Dept: ADMINISTRATIVE | Facility: OTHER | Age: 64
End: 2024-08-29

## 2024-08-29 NOTE — TELEPHONE ENCOUNTER
08/29/24 10:29 AM    The patient was called and declined scheduling a DEXA, please discuss with patient at next appointment.  No message can be left     Thank you.  Sofia Morton  [facility]

## 2024-09-13 ENCOUNTER — TELEPHONE (OUTPATIENT)
Dept: NEUROLOGY | Facility: CLINIC | Age: 64
End: 2024-09-13

## 2024-09-13 NOTE — ED PROVIDER NOTES
History  Chief Complaint   Patient presents with    CVA/TIA-like Symptoms     Patient states had mri of brain earlier and instructed by doctor to come to er for stroke      63-year-old female who presents after receiving results from an outpatient MRI.  10 days ago, patient had an episode of confusion, difficulty speaking, left upper extremity weakness.  The symptoms have subsequently resolved except for some slight left upper extremity weakness per the patient.  Seen by cardiology today.  Patient reported the symptoms.  She was sent for a stat MRI which essentially showed acute/subacute cortical infarcts.  She was sent to the emergency department for evaluation.        Prior to Admission Medications   Prescriptions Last Dose Informant Patient Reported? Taking?   ALPRAZolam (XANAX) 0.25 mg tablet   No Yes   Sig: Take 1 tablet (0.25 mg total) by mouth once as needed for anxiety (for anxiety/claustrophobia (take one, additonal one as needed)) for up to 2 doses Do not drive after taking this medication.   furosemide (LASIX) 20 mg tablet Not Taking Self No No   Sig: Take 1 tablet (20 mg total) by mouth daily Take 1 tablet (20 mg) for the next three days.   Patient not taking: Reported on 6/5/2023   losartan (COZAAR) 25 mg tablet   No Yes   Sig: Take 0.5 tablets (12.5 mg total) by mouth daily   metoprolol succinate (TOPROL-XL) 50 mg 24 hr tablet  Self No Yes   Sig: Take 1 tablet (50 mg total) by mouth daily   spironolactone (ALDACTONE) 25 mg tablet  Self No Yes   Sig: Take 1 tablet (25 mg total) by mouth daily      Facility-Administered Medications: None       Past Medical History:   Diagnosis Date    CHF (congestive heart failure) (HCC)     Hypertension        Past Surgical History:   Procedure Laterality Date    CARDIAC CATHETERIZATION N/A 6/6/2022    Procedure: Cardiac catheterization;  Surgeon: Donta Guardado MD;  Location: [facility];  Service: Cardiology    CERVICAL CONE BIOPSY      FL GUIDED NEEDLE PLAC  Intake signed by Dr. Cherry. Appointment in 2-3 weeks (at 16 wks) with cervical length. To PSR for scheduling.   BX/ASP/INJ  10/21/2020    HIP SURGERY      HIP SURGERY         Family History   Problem Relation Age of Onset    Diabetes Mother     COPD Mother     Hyperlipidemia Mother     Stroke Mother     Lung cancer Father      I have reviewed and agree with the history as documented.    E-Cigarette/Vaping    E-Cigarette Use Never User      E-Cigarette/Vaping Substances    Nicotine No     THC No     CBD No     Flavoring No     Other No     Unknown No      Social History     Tobacco Use    Smoking status: Some Days     Current packs/day: 0.50     Types: Cigarettes    Smokeless tobacco: Never   Vaping Use    Vaping status: Never Used   Substance Use Topics    Alcohol use: Yes     Alcohol/week: 4.0 standard drinks of alcohol     Types: 4 Shots of liquor per week    Drug use: Yes     Types: Marijuana       Review of Systems   Constitutional:  Negative for chills and fever.   HENT:  Negative for rhinorrhea, sore throat and trouble swallowing.    Eyes:  Negative for photophobia and visual disturbance.   Respiratory:  Negative for cough, chest tightness and shortness of breath.    Cardiovascular:  Negative for chest pain, palpitations and leg swelling.   Gastrointestinal:  Negative for abdominal pain, blood in stool, diarrhea, nausea and vomiting.   Endocrine: Negative for polyuria.   Genitourinary:  Negative for dysuria, flank pain, hematuria, vaginal bleeding and vaginal discharge.   Musculoskeletal:  Negative for back pain and neck pain.   Skin:  Negative for color change and rash.   Allergic/Immunologic: Negative for immunocompromised state.   Neurological:  Positive for weakness. Negative for dizziness, light-headedness, numbness and headaches.   All other systems reviewed and are negative.      Physical Exam  Physical Exam  Vitals and nursing note reviewed.   Constitutional:       General: She is not in acute distress.     Appearance: She is well-developed.   HENT:      Head: Normocephalic and atraumatic.      Comments:  Chronically ill-appearing.     Mouth/Throat:      Lips: Pink.      Mouth: Mucous membranes are moist.   Eyes:      General: Lids are normal.      Extraocular Movements: Extraocular movements intact.      Conjunctiva/sclera: Conjunctivae normal.      Pupils: Pupils are equal, round, and reactive to light.      Visual Fields: Right eye visual fields normal and left eye visual fields normal.   Cardiovascular:      Rate and Rhythm: Normal rate and regular rhythm.      Heart sounds: Normal heart sounds. No murmur heard.  Pulmonary:      Effort: Pulmonary effort is normal.      Breath sounds: Normal breath sounds.   Abdominal:      General: There is no distension.      Palpations: Abdomen is soft.      Tenderness: There is no abdominal tenderness. There is no guarding or rebound.   Musculoskeletal:         General: No swelling.      Cervical back: Full passive range of motion without pain, normal range of motion and neck supple.   Skin:     General: Skin is warm.      Capillary Refill: Capillary refill takes less than 2 seconds.   Neurological:      General: No focal deficit present.      Mental Status: She is alert and oriented to person, place, and time.      GCS: GCS eye subscore is 4. GCS verbal subscore is 5. GCS motor subscore is 6.      Comments: Cranial nerves II through XII intact, sensation intact throughout, strength out of 5 throughout.  No drift in bilateral upper or lower extremities.  Subtle dysmetria noted in left upper extremity.  Normal finger-nose right upper extremity.  Normal heel-to-shin bilateral lower extremities.  No hemineglect.  No extinction.  Visual fields tested and normal.   Psychiatric:         Mood and Affect: Mood normal.         Speech: Speech normal.         Behavior: Behavior normal.         Vital Signs  ED Triage Vitals   Temperature Pulse Respirations Blood Pressure SpO2   02/07/24 2220 02/07/24 2211 02/07/24 2211 02/07/24 2211 02/07/24 2211   98.4 °F (36.9 °C) 98 18 (!) 173/99 99 %       Temp Source Heart Rate Source Patient Position - Orthostatic VS BP Location FiO2 (%)   02/07/24 2220 02/07/24 2230 02/07/24 2211 02/07/24 2211 --   Oral Monitor Lying Right arm       Pain Score       --                  Vitals:    02/07/24 2211 02/07/24 2230 02/07/24 2250   BP: (!) 173/99 141/90 145/91   Pulse: 98 97 103   Patient Position - Orthostatic VS: Lying  Sitting         Visual Acuity  Visual Acuity      Flowsheet Row Most Recent Value   L Pupil Size (mm) 4   R Pupil Size (mm) 4            ED Medications  Medications   heparin (porcine) 25,000 units in 0.45% NaCl 250 mL infusion (premix) (has no administration in time range)       Diagnostic Studies  Results Reviewed       Procedure Component Value Units Date/Time    Comprehensive metabolic panel [[id number]] Collected: 02/07/24 2251    Lab Status: In process Specimen: Blood from Arm, Left Updated: 02/07/24 2257    CBC and differential [[id number]] Collected: 02/07/24 2251    Lab Status: In process Specimen: Blood from Arm, Left Updated: 02/07/24 2257    Protime-INR [[id number]]     Lab Status: No result Specimen: Blood     APTT [[id number]]     Lab Status: No result Specimen: Blood     Fingerstick Glucose (POCT) [[id number]]  (Abnormal) Collected: 02/07/24 2214    Lab Status: Final result Updated: 02/07/24 2214     POC Glucose 143 mg/dl                    No orders to display              Procedures  CriticalCare Time    Date/Time: 2/7/2024 10:48 PM    Performed by: Ricardo Garcia MD  Authorized by: Ricardo Garcia MD    Critical care provider statement:     Critical care time (minutes):  40    Critical care time was exclusive of:  Separately billable procedures and treating other patients    Critical care was necessary to treat or prevent imminent or life-threatening deterioration of the following conditions:  CNS failure or compromise    Critical care was time spent personally by me on the following activities:  Obtaining history from patient or  surrogate, development of treatment plan with patient or surrogate, discussions with consultants, examination of patient, review of old charts, re-evaluation of patient's condition, ordering and review of radiographic studies and ordering and review of laboratory studies    I assumed direction of critical care for this patient from another provider in my specialty: no    ECG 12 Lead Documentation Only    Date/Time: 2/7/2024 10:51 PM    Performed by: Ricardo Garcia MD  Authorized by: Ricardo Garcia MD    ECG reviewed by me, the ED Provider: yes    Patient location:  ED  Previous ECG:     Previous ECG:  Compared to current    Comparison ECG info:  4/4/23    Similarity:  No change    Comparison to cardiac monitor: Yes    Interpretation:     Interpretation: non-specific    Rate:     ECG rate:  95    ECG rate assessment: normal    Rhythm:     Rhythm: sinus rhythm    Ectopy:     Ectopy: none    QRS:     QRS axis:  Normal    QRS intervals:  Normal  Conduction:     Conduction: normal    ST segments:     ST segments:  Normal  T waves:     T waves: normal    Other findings:     Other findings: LVH with strain             ED Course  ED Course as of 02/07/24 2258   Wed Feb 07, 2024 2243 Spoke with Dr. Taveras regarding the patient's case including exam, mri finding, echo findings.  Recommends admission for echo.  Start heparin drip per stroke protocol and keep sbp < 180.                                             Medical Decision Making  Given that the patient already has an outpatient MRI and appears to be scheduled for an outpatient echo.  Will reach out to neurology for further recommendations.  Refer to update ED course note for recommendations.    Problems Addressed:  CVA (cerebral vascular accident) (HCC): complicated acute illness or injury with systemic symptoms that poses a threat to life or bodily functions  Hypertension: chronic illness or injury  PFO (patent foramen ovale): chronic illness or  "injury    Amount and/or Complexity of Data Reviewed  External Data Reviewed: radiology and notes.     Details: MRI brain 2/7/2024:  \"1.  Acute or subacute cortical infarcts in the right precentral gyrus and right parietal lobe. No significant edema, mass effect, or hemorrhagic transformation.  2.  Chronic left gangliocapsular and corona radiata lacunar infarcts. Chronic right basal ganglia lacunar infarcts. Mild microangiopathic change.\"          Echo 4/5/2023:  \"  Left Ventricle: Left ventricular cavity size is normal. Wall thickness is normal. The left ventricular ejection fraction is 28%. Systolic function is severely reduced. Global longitudinal strain is reduced at -8% with an apical sparing \"bull's-eye\" pattern. There is severe global hypokinesis with regional variation. Diastolic function is severely abnormal, consistent with ungraded restrictive relaxation.  Left atrial filling pressure is elevated.  There is a patent foramen ovale confirmed at rest with predominant left to right shunting using color Doppler.\"  Labs: ordered.    Risk  Prescription drug management.  Decision regarding hospitalization.             Disposition  Final diagnoses:   CVA (cerebral vascular accident) (HCC)   PFO (patent foramen ovale)   Hypertension     Time reflects when diagnosis was documented in both MDM as applicable and the Disposition within this note       Time User Action Codes Description Comment    2/7/2024 10:49 PM Ricardo Garcia Add [I63.9] CVA (cerebral vascular accident) (HCC)     2/7/2024 10:49 PM Ricardo Garcia Add [Q21.12] PFO (patent foramen ovale)     2/7/2024 10:49 PM Ricardo Garcia Add [I10] Hypertension           ED Disposition       ED Disposition   Admit    Condition   Stable    Date/Time   Wed Feb 7, 2024 10:49 PM    Comment                  Follow-up Information    None         Patient's Medications   Discharge Prescriptions    No medications on file       No discharge procedures on file.    PDMP " Review         Value Time User    PDMP Reviewed  Yes 2/7/2024 10:35 AM Yue Aparicio PA-C            ED Provider  Electronically Signed by             Ricardo Garcia MD  02/07/24 4452

## 2024-09-21 DIAGNOSIS — I50.22 CHRONIC SYSTOLIC CONGESTIVE HEART FAILURE (HCC): ICD-10-CM

## 2024-09-21 DIAGNOSIS — I42.8 NON-ISCHEMIC CARDIOMYOPATHY (HCC): ICD-10-CM

## 2024-09-23 RX ORDER — SPIRONOLACTONE 25 MG/1
25 TABLET ORAL DAILY
Qty: 90 TABLET | Refills: 1 | Status: SHIPPED | OUTPATIENT
Start: 2024-09-23

## 2024-12-30 DIAGNOSIS — I42.8 NON-ISCHEMIC CARDIOMYOPATHY (HCC): ICD-10-CM

## 2024-12-30 DIAGNOSIS — I50.22 CHRONIC SYSTOLIC CONGESTIVE HEART FAILURE (HCC): ICD-10-CM

## 2025-01-02 RX ORDER — SPIRONOLACTONE 25 MG/1
25 TABLET ORAL DAILY
Qty: 90 TABLET | Refills: 0 | Status: SHIPPED | OUTPATIENT
Start: 2025-01-02

## 2025-01-15 DIAGNOSIS — I50.9 CHF (CONGESTIVE HEART FAILURE) (HCC): ICD-10-CM

## 2025-01-16 RX ORDER — METOPROLOL SUCCINATE 50 MG/1
50 TABLET, EXTENDED RELEASE ORAL DAILY
Qty: 90 TABLET | Refills: 0 | Status: SHIPPED | OUTPATIENT
Start: 2025-01-16

## 2025-04-05 DIAGNOSIS — I50.22 CHRONIC SYSTOLIC CONGESTIVE HEART FAILURE (HCC): ICD-10-CM

## 2025-04-05 DIAGNOSIS — I42.8 NON-ISCHEMIC CARDIOMYOPATHY (HCC): ICD-10-CM

## 2025-04-08 RX ORDER — SPIRONOLACTONE 25 MG/1
25 TABLET ORAL DAILY
Qty: 90 TABLET | Refills: 0 | Status: SHIPPED | OUTPATIENT
Start: 2025-04-08

## 2025-04-17 DIAGNOSIS — I50.9 CHF (CONGESTIVE HEART FAILURE) (HCC): ICD-10-CM

## 2025-04-18 RX ORDER — METOPROLOL SUCCINATE 50 MG/1
50 TABLET, EXTENDED RELEASE ORAL DAILY
Qty: 90 TABLET | Refills: 0 | Status: SHIPPED | OUTPATIENT
Start: 2025-04-18

## 2025-08-03 DIAGNOSIS — I42.8 NON-ISCHEMIC CARDIOMYOPATHY (HCC): ICD-10-CM

## 2025-08-03 DIAGNOSIS — I50.22 CHRONIC SYSTOLIC CONGESTIVE HEART FAILURE (HCC): ICD-10-CM

## 2025-08-03 DIAGNOSIS — I50.9 CHF (CONGESTIVE HEART FAILURE) (HCC): ICD-10-CM

## 2025-08-05 RX ORDER — METOPROLOL SUCCINATE 50 MG/1
50 TABLET, EXTENDED RELEASE ORAL DAILY
Qty: 90 TABLET | Refills: 0 | Status: SHIPPED | OUTPATIENT
Start: 2025-08-05

## 2025-08-05 RX ORDER — SPIRONOLACTONE 25 MG/1
25 TABLET ORAL DAILY
Qty: 90 TABLET | Refills: 0 | Status: SHIPPED | OUTPATIENT
Start: 2025-08-05

## (undated) DEVICE — GUIDEWIRE WHOLEY HI TORQUE INTERM MOD J .035 145CM

## (undated) DEVICE — DGW .035 FC J3MM 260CM TEF: Brand: EMERALD

## (undated) DEVICE — GLIDESHEATH BASIC HYDROPHILIC COATED INTRODUCER SHEATH: Brand: GLIDESHEATH

## (undated) DEVICE — RADIFOCUS OPTITORQUE ANGIOGRAPHIC CATHETER: Brand: OPTITORQUE

## (undated) DEVICE — TR BAND RADIAL ARTERY COMPRESSION DEVICE: Brand: TR BAND